# Patient Record
Sex: FEMALE | Race: BLACK OR AFRICAN AMERICAN | NOT HISPANIC OR LATINO | Employment: OTHER | URBAN - METROPOLITAN AREA
[De-identification: names, ages, dates, MRNs, and addresses within clinical notes are randomized per-mention and may not be internally consistent; named-entity substitution may affect disease eponyms.]

---

## 2023-06-07 ENCOUNTER — APPOINTMENT (OUTPATIENT)
Dept: PRIMARY CARE | Facility: CLINIC | Age: 78
End: 2023-06-07
Payer: MEDICARE

## 2023-08-17 ENCOUNTER — LAB (OUTPATIENT)
Dept: LAB | Facility: LAB | Age: 78
End: 2023-08-17
Payer: MEDICARE

## 2023-08-17 ENCOUNTER — OFFICE VISIT (OUTPATIENT)
Dept: PRIMARY CARE | Facility: CLINIC | Age: 78
End: 2023-08-17
Payer: MEDICARE

## 2023-08-17 VITALS
BODY MASS INDEX: 23.59 KG/M2 | HEART RATE: 58 BPM | WEIGHT: 146.8 LBS | DIASTOLIC BLOOD PRESSURE: 67 MMHG | SYSTOLIC BLOOD PRESSURE: 128 MMHG | OXYGEN SATURATION: 99 % | HEIGHT: 66 IN

## 2023-08-17 DIAGNOSIS — Z95.0 S/P PLACEMENT OF CARDIAC PACEMAKER: ICD-10-CM

## 2023-08-17 DIAGNOSIS — Z95.1 S/P CABG (CORONARY ARTERY BYPASS GRAFT): ICD-10-CM

## 2023-08-17 DIAGNOSIS — R35.0 INCREASED FREQUENCY OF URINATION: ICD-10-CM

## 2023-08-17 DIAGNOSIS — I44.2 COMPLETE HEART BLOCK (MULTI): ICD-10-CM

## 2023-08-17 DIAGNOSIS — I48.3 TYPICAL ATRIAL FLUTTER (MULTI): Primary | ICD-10-CM

## 2023-08-17 DIAGNOSIS — Z76.89 ENCOUNTER TO ESTABLISH CARE: ICD-10-CM

## 2023-08-17 DIAGNOSIS — Z95.2 S/P MVR (MITRAL VALVE REPLACEMENT): ICD-10-CM

## 2023-08-17 DIAGNOSIS — K57.32 DIVERTICULITIS OF LARGE INTESTINE WITHOUT PERFORATION OR ABSCESS WITHOUT BLEEDING: ICD-10-CM

## 2023-08-17 DIAGNOSIS — E78.2 MIXED HYPERLIPIDEMIA: ICD-10-CM

## 2023-08-17 DIAGNOSIS — I10 PRIMARY HYPERTENSION: ICD-10-CM

## 2023-08-17 PROBLEM — E78.5 HYPERLIPIDEMIA: Status: ACTIVE | Noted: 2023-08-17

## 2023-08-17 PROBLEM — M43.17 SPONDYLOLISTHESIS OF LUMBOSACRAL REGION: Status: ACTIVE | Noted: 2023-08-17

## 2023-08-17 PROBLEM — F41.9 ANXIETY DISORDER: Status: ACTIVE | Noted: 2023-08-17

## 2023-08-17 LAB
ALANINE AMINOTRANSFERASE (SGPT) (U/L) IN SER/PLAS: 7 U/L (ref 7–45)
ALBUMIN (G/DL) IN SER/PLAS: 4.3 G/DL (ref 3.4–5)
ALKALINE PHOSPHATASE (U/L) IN SER/PLAS: 74 U/L (ref 33–136)
ANION GAP IN SER/PLAS: 14 MMOL/L (ref 10–20)
APPEARANCE, URINE: CLEAR
ASPARTATE AMINOTRANSFERASE (SGOT) (U/L) IN SER/PLAS: 14 U/L (ref 9–39)
BASOPHILS (10*3/UL) IN BLOOD BY AUTOMATED COUNT: 0.12 X10E9/L (ref 0–0.1)
BASOPHILS/100 LEUKOCYTES IN BLOOD BY AUTOMATED COUNT: 0.6 % (ref 0–2)
BILIRUBIN TOTAL (MG/DL) IN SER/PLAS: 0.6 MG/DL (ref 0–1.2)
BILIRUBIN, URINE: NEGATIVE
BLOOD, URINE: ABNORMAL
C REACTIVE PROTEIN (MG/L) IN SER/PLAS: 5.25 MG/DL
CALCIUM (MG/DL) IN SER/PLAS: 10 MG/DL (ref 8.6–10.3)
CARBON DIOXIDE, TOTAL (MMOL/L) IN SER/PLAS: 27 MMOL/L (ref 21–32)
CHLORIDE (MMOL/L) IN SER/PLAS: 97 MMOL/L (ref 98–107)
COLOR, URINE: ABNORMAL
CREATININE (MG/DL) IN SER/PLAS: 1.03 MG/DL (ref 0.5–1.05)
EOSINOPHILS (10*3/UL) IN BLOOD BY AUTOMATED COUNT: 0.15 X10E9/L (ref 0–0.4)
EOSINOPHILS/100 LEUKOCYTES IN BLOOD BY AUTOMATED COUNT: 0.8 % (ref 0–6)
ERYTHROCYTE DISTRIBUTION WIDTH (RATIO) BY AUTOMATED COUNT: 13.4 % (ref 11.5–14.5)
ERYTHROCYTE MEAN CORPUSCULAR HEMOGLOBIN CONCENTRATION (G/DL) BY AUTOMATED: 32.7 G/DL (ref 32–36)
ERYTHROCYTE MEAN CORPUSCULAR VOLUME (FL) BY AUTOMATED COUNT: 81 FL (ref 80–100)
ERYTHROCYTES (10*6/UL) IN BLOOD BY AUTOMATED COUNT: 4.97 X10E12/L (ref 4–5.2)
GFR FEMALE: 56 ML/MIN/1.73M2
GLUCOSE (MG/DL) IN SER/PLAS: 96 MG/DL (ref 74–99)
GLUCOSE, URINE: NEGATIVE MG/DL
HEMATOCRIT (%) IN BLOOD BY AUTOMATED COUNT: 40.4 % (ref 36–46)
HEMOGLOBIN (G/DL) IN BLOOD: 13.2 G/DL (ref 12–16)
IMMATURE GRANULOCYTES/100 LEUKOCYTES IN BLOOD BY AUTOMATED COUNT: 0.5 % (ref 0–0.9)
KETONES, URINE: NEGATIVE MG/DL
LEUKOCYTE ESTERASE, URINE: ABNORMAL
LEUKOCYTES (10*3/UL) IN BLOOD BY AUTOMATED COUNT: 19 X10E9/L (ref 4.4–11.3)
LYMPHOCYTES (10*3/UL) IN BLOOD BY AUTOMATED COUNT: 9.06 X10E9/L (ref 0.8–3)
LYMPHOCYTES/100 LEUKOCYTES IN BLOOD BY AUTOMATED COUNT: 47.7 % (ref 13–44)
MONOCYTES (10*3/UL) IN BLOOD BY AUTOMATED COUNT: 1.18 X10E9/L (ref 0.05–0.8)
MONOCYTES/100 LEUKOCYTES IN BLOOD BY AUTOMATED COUNT: 6.2 % (ref 2–10)
NEUTROPHILS (10*3/UL) IN BLOOD BY AUTOMATED COUNT: 8.38 X10E9/L (ref 1.6–5.5)
NEUTROPHILS/100 LEUKOCYTES IN BLOOD BY AUTOMATED COUNT: 44.2 % (ref 40–80)
NITRITE, URINE: NEGATIVE
PH, URINE: 7 (ref 5–8)
PLATELETS (10*3/UL) IN BLOOD AUTOMATED COUNT: 264 X10E9/L (ref 150–450)
POTASSIUM (MMOL/L) IN SER/PLAS: 4.3 MMOL/L (ref 3.5–5.3)
PROTEIN TOTAL: 8 G/DL (ref 6.4–8.2)
PROTEIN, URINE: NEGATIVE MG/DL
RBC, URINE: NORMAL /HPF (ref 0–5)
SODIUM (MMOL/L) IN SER/PLAS: 134 MMOL/L (ref 136–145)
SPECIFIC GRAVITY, URINE: 1 (ref 1–1.03)
SQUAMOUS EPITHELIAL CELLS, URINE: NORMAL /HPF
UREA NITROGEN (MG/DL) IN SER/PLAS: 14 MG/DL (ref 6–23)
UROBILINOGEN, URINE: <2 MG/DL (ref 0–1.9)
WBC, URINE: NORMAL /HPF (ref 0–5)

## 2023-08-17 PROCEDURE — 36415 COLL VENOUS BLD VENIPUNCTURE: CPT

## 2023-08-17 PROCEDURE — 99204 OFFICE O/P NEW MOD 45 MIN: CPT | Performed by: STUDENT IN AN ORGANIZED HEALTH CARE EDUCATION/TRAINING PROGRAM

## 2023-08-17 PROCEDURE — 80053 COMPREHEN METABOLIC PANEL: CPT

## 2023-08-17 PROCEDURE — 1126F AMNT PAIN NOTED NONE PRSNT: CPT | Performed by: STUDENT IN AN ORGANIZED HEALTH CARE EDUCATION/TRAINING PROGRAM

## 2023-08-17 PROCEDURE — 85025 COMPLETE CBC W/AUTO DIFF WBC: CPT

## 2023-08-17 PROCEDURE — 1159F MED LIST DOCD IN RCRD: CPT | Performed by: STUDENT IN AN ORGANIZED HEALTH CARE EDUCATION/TRAINING PROGRAM

## 2023-08-17 PROCEDURE — 3074F SYST BP LT 130 MM HG: CPT | Performed by: STUDENT IN AN ORGANIZED HEALTH CARE EDUCATION/TRAINING PROGRAM

## 2023-08-17 PROCEDURE — 81001 URINALYSIS AUTO W/SCOPE: CPT

## 2023-08-17 PROCEDURE — 1036F TOBACCO NON-USER: CPT | Performed by: STUDENT IN AN ORGANIZED HEALTH CARE EDUCATION/TRAINING PROGRAM

## 2023-08-17 PROCEDURE — 1157F ADVNC CARE PLAN IN RCRD: CPT | Performed by: STUDENT IN AN ORGANIZED HEALTH CARE EDUCATION/TRAINING PROGRAM

## 2023-08-17 PROCEDURE — 1160F RVW MEDS BY RX/DR IN RCRD: CPT | Performed by: STUDENT IN AN ORGANIZED HEALTH CARE EDUCATION/TRAINING PROGRAM

## 2023-08-17 PROCEDURE — 3078F DIAST BP <80 MM HG: CPT | Performed by: STUDENT IN AN ORGANIZED HEALTH CARE EDUCATION/TRAINING PROGRAM

## 2023-08-17 PROCEDURE — 86140 C-REACTIVE PROTEIN: CPT

## 2023-08-17 RX ORDER — AMLODIPINE BESYLATE 10 MG/1
10 TABLET ORAL DAILY
COMMUNITY
Start: 2020-08-25 | End: 2023-12-01 | Stop reason: SDUPTHER

## 2023-08-17 RX ORDER — ATORVASTATIN CALCIUM 40 MG/1
40 TABLET, FILM COATED ORAL
COMMUNITY
End: 2023-12-13 | Stop reason: SDUPTHER

## 2023-08-17 RX ORDER — DOCUSATE SODIUM 100 MG/1
100 CAPSULE, LIQUID FILLED ORAL 2 TIMES DAILY
COMMUNITY
Start: 2019-09-20 | End: 2024-01-25 | Stop reason: SDUPTHER

## 2023-08-17 RX ORDER — METOPROLOL TARTRATE 50 MG/1
50 TABLET ORAL EVERY 12 HOURS
COMMUNITY
End: 2023-09-18 | Stop reason: SDUPTHER

## 2023-08-17 RX ORDER — SPIRONOLACTONE 25 MG/1
25 TABLET ORAL DAILY
COMMUNITY
Start: 2019-10-25 | End: 2023-10-10 | Stop reason: SDUPTHER

## 2023-08-17 RX ORDER — ALBUTEROL SULFATE 90 UG/1
AEROSOL, METERED RESPIRATORY (INHALATION)
COMMUNITY
Start: 2017-04-18 | End: 2023-12-18

## 2023-08-17 RX ORDER — ACETAMINOPHEN 500 MG
1 TABLET ORAL DAILY
COMMUNITY
Start: 2021-12-17 | End: 2023-08-17

## 2023-08-17 RX ORDER — WARFARIN 3 MG/1
1 TABLET ORAL DAILY
COMMUNITY
Start: 2019-10-08 | End: 2023-12-01 | Stop reason: SDUPTHER

## 2023-08-17 RX ORDER — CHOLECALCIFEROL (VITAMIN D3) 125 MCG
CAPSULE ORAL
COMMUNITY

## 2023-08-17 RX ORDER — METRONIDAZOLE 500 MG/1
500 TABLET ORAL 3 TIMES DAILY
Qty: 30 TABLET | Refills: 0 | Status: SHIPPED | OUTPATIENT
Start: 2023-08-17 | End: 2023-08-28 | Stop reason: ALTCHOICE

## 2023-08-17 RX ORDER — LANOLIN ALCOHOL/MO/W.PET/CERES
1 CREAM (GRAM) TOPICAL DAILY
COMMUNITY
Start: 2019-12-05

## 2023-08-17 RX ORDER — LEVOFLOXACIN 750 MG/1
750 TABLET ORAL DAILY
Qty: 10 TABLET | Refills: 0 | Status: SHIPPED | OUTPATIENT
Start: 2023-08-17 | End: 2023-08-28 | Stop reason: ALTCHOICE

## 2023-08-17 RX ORDER — FERROUS SULFATE 325(65) MG
65 TABLET ORAL
COMMUNITY
Start: 2019-10-25

## 2023-08-17 ASSESSMENT — ENCOUNTER SYMPTOMS
LOSS OF SENSATION IN FEET: 0
OCCASIONAL FEELINGS OF UNSTEADINESS: 0
DEPRESSION: 0

## 2023-08-17 NOTE — PROGRESS NOTES
"Subjective   Patient ID: Neha Tsang is a 77 y.o. female who presents for New Patient Visit (Establish care. ).        HPI    Est care   Pt's PMH, PSH, SH, FH , meds and allergies was obtained / reviewed and updated .     78 y/o female with HTN, HPL , CAD s/p CABG , mitral and tricuspid valve repair , atrial flutter ablation and pacemaker placement in 2019, ON Ac with coumadin clinic , recurrent diverticulitis , advanced DJD L spine     Pt reports LLQ pain X 5 weeks   Started with constipation followed by diarrhea   Colonoscopy in March 2021 , reviewed           Visit Vitals  /67   Pulse 58   Ht 1.676 m (5' 6\")   Wt 66.6 kg (146 lb 12.8 oz)   SpO2 99%   BMI 23.69 kg/m²   Smoking Status Former   BSA 1.76 m²      No LMP recorded.     Review of Systems    Constitutional : No feeling poorly / fevers/ chills / night sweats/ fatigue   Cardiovascular : No CP /Palpitations/ lower extremity edema / syncope   Respiratory : No Cough /LARA/Dyspnea at rest   Gastrointestinal : No abd pain / N/V  No bloody stools/ melena / constipation  Endo : No polyuria/polydipsia/ muscle weakness / sluggishness   CNS: No confusion / HA/ tingling/ numbness/ weakness of extremities  Psychiatric: No anxiety/ depression/ SI/HI    All other systems have been reviewed and are negative for complaint       Physical Exam    Constitutional : Vitals reviewed. Alert and in no distress  Cardiovascular : RRR, Normal S1, S2, No pericardial rub/ gallop, no peripheral edema   Pulmonary: No respiratory distress, CTAB   Abdomen : Soft to touch , Tender to palpation in the LLQ , normal BS , no appreciable  organomegaly   MSK : Normal gait and station , strength and tone     Neurologic : CNs 2-12 grossly intact , no obvious FNDs  Psych : A,Ox3, normal mood and affect      Assessment/Plan   Diagnoses and all orders for this visit:  Typical atrial flutter (CMS/HCC)  Encounter to establish care  S/P placement of cardiac pacemaker  S/P CABG (coronary " artery bypass graft)  S/P MVR (mitral valve replacement)  Primary hypertension  Mixed hyperlipidemia  Complete heart block (CMS/HCC)  Diverticulitis of large intestine without perforation or abscess without bleeding  -     CBC and Auto Differential; Future  -     Comprehensive Metabolic Panel; Future  -     C-reactive protein; Future  -     levoFLOXacin (Levaquin) 750 mg tablet; Take 1 tablet (750 mg) by mouth once daily for 10 days.  -     metroNIDAZOLE (Flagyl) 500 mg tablet; Take 1 tablet (500 mg) by mouth 3 times a day for 10 days.  Increased frequency of urination  -     Urinalysis with Reflex Microscopic; Future  -     Urine Culture; Future    78 y/o female with HTN, HPL , CAD s/p CABG , mitral and tricuspid valve repair , atrial flutter ablation and pacemaker placement in 2019, ON Ac with coumadin clinic , recurrent diverticulitis , advanced DJD L spine      Given the history of recurrent diverticulitis, Colonoscopy findings and presenting sx , will rx with abx   Labs as noted above   Discussed the possibility of supra therapeutic inr while on abx     UA to r/o infection for increased frequency of urination     RTO in 1m   Labs to be done today

## 2023-08-25 ENCOUNTER — PATIENT OUTREACH (OUTPATIENT)
Dept: PRIMARY CARE | Facility: CLINIC | Age: 78
End: 2023-08-25
Payer: MEDICARE

## 2023-08-25 ENCOUNTER — DOCUMENTATION (OUTPATIENT)
Dept: PRIMARY CARE | Facility: CLINIC | Age: 78
End: 2023-08-25
Payer: MEDICARE

## 2023-08-25 RX ORDER — ACETAMINOPHEN 325 MG/1
TABLET ORAL EVERY 4 HOURS PRN
COMMUNITY
Start: 2021-06-23

## 2023-08-25 RX ORDER — ASPIRIN 81 MG/1
TABLET ORAL
COMMUNITY
Start: 2019-10-07

## 2023-08-25 RX ORDER — AMOXICILLIN AND CLAVULANATE POTASSIUM 875; 125 MG/1; MG/1
TABLET, FILM COATED ORAL EVERY 12 HOURS
COMMUNITY
Start: 2023-08-24 | End: 2023-08-28

## 2023-08-25 NOTE — PROGRESS NOTES
Discharge Facility:  [facility]   Discharge Diagnosis:  Acute sigmoid Diverticulitis, abdominal pain, failed outpatient treatment, attention, previous history of coronary artery bypass surgery and mitral and tricuspid valve repair, chronic atrial fibrillation on Coumadin, Coumadin induced coagulopathy.  Admission Date: 8-21-23  Discharge Date: 8-24-23     PCP Appointment Date: 8-28-23  Specialist Appointment Date:  n/a  Hospital Encounter and Summary: Linked   See discharge assessment below for further details  Engagement  Call Start Time: 0856 (8/25/2023  9:01 AM)    Medications  Medications reviewed with patient/caregiver?: Yes (8/25/2023  9:01 AM)  Is the patient having any side effects they believe may be caused by any medication additions or changes?: No (8/25/2023  9:01 AM)  Does the patient have all medications ordered at discharge?: Yes (8/25/2023  9:01 AM)  Is the patient taking all medications as directed (includes completed medication regime)?: Yes (8/25/2023  9:01 AM)  Care Management Interventions: Provided patient education (8/25/2023  9:01 AM)    Appointments  Does the patient have a primary care provider?: Yes (8/25/2023  9:01 AM)  Care Management Interventions: Verified appointment date/time/provider (8/25/2023  9:01 AM)  Has the patient kept scheduled appointments due by today?: Yes (8/25/2023  9:01 AM)  Care Management Interventions: Advised patient to keep appointment (8/25/2023  9:01 AM)    Self Management  Has home health visited the patient within 72 hours of discharge?: Not applicable (8/25/2023  9:01 AM)    Patient Teaching  Does the patient have access to their discharge instructions?: Yes (8/25/2023  9:01 AM)  Care Management Interventions: Reviewed instructions with patient (8/25/2023  9:01 AM)  What is the patient's perception of their health status since discharge?: Improving (8/25/2023  9:01 AM)  Is the patient/caregiver able to teach back the hierarchy of who to call/visit for  symptoms/problems? PCP, Specialist, Home Health nurse, Urgent Care, ED, 911: Yes (8/25/2023  9:01 AM)      Jyothi Addison LPN

## 2023-08-28 ENCOUNTER — LAB (OUTPATIENT)
Dept: LAB | Facility: LAB | Age: 78
End: 2023-08-28
Payer: MEDICARE

## 2023-08-28 ENCOUNTER — OFFICE VISIT (OUTPATIENT)
Dept: PRIMARY CARE | Facility: CLINIC | Age: 78
End: 2023-08-28
Payer: MEDICARE

## 2023-08-28 VITALS
OXYGEN SATURATION: 99 % | SYSTOLIC BLOOD PRESSURE: 142 MMHG | HEIGHT: 66 IN | WEIGHT: 143.2 LBS | BODY MASS INDEX: 23.01 KG/M2 | HEART RATE: 110 BPM | DIASTOLIC BLOOD PRESSURE: 75 MMHG

## 2023-08-28 DIAGNOSIS — Z09 HOSPITAL DISCHARGE FOLLOW-UP: ICD-10-CM

## 2023-08-28 DIAGNOSIS — M15.9 GOA (GENERALIZED OSTEOARTHRITIS): ICD-10-CM

## 2023-08-28 DIAGNOSIS — B37.31 VAGINAL YEAST INFECTION: Primary | ICD-10-CM

## 2023-08-28 DIAGNOSIS — R30.0 DYSURIA: ICD-10-CM

## 2023-08-28 LAB
ANION GAP IN SER/PLAS: 15 MMOL/L (ref 10–20)
APPEARANCE, URINE: ABNORMAL
BACTERIA, URINE: ABNORMAL /HPF
BILIRUBIN, URINE: NEGATIVE
BLOOD, URINE: ABNORMAL
CALCIUM (MG/DL) IN SER/PLAS: 10.1 MG/DL (ref 8.6–10.3)
CALCIUM OXALATE CRYSTALS, URINE: ABNORMAL /HPF
CARBON DIOXIDE, TOTAL (MMOL/L) IN SER/PLAS: 27 MMOL/L (ref 21–32)
CHLORIDE (MMOL/L) IN SER/PLAS: 101 MMOL/L (ref 98–107)
COLOR, URINE: YELLOW
CREATININE (MG/DL) IN SER/PLAS: 0.88 MG/DL (ref 0.5–1.05)
GFR FEMALE: 68 ML/MIN/1.73M2
GLUCOSE (MG/DL) IN SER/PLAS: 107 MG/DL (ref 74–99)
GLUCOSE, URINE: NEGATIVE MG/DL
HYALINE CASTS, URINE: ABNORMAL /LPF
KETONES, URINE: NEGATIVE MG/DL
LEUKOCYTE ESTERASE, URINE: ABNORMAL
MUCUS, URINE: ABNORMAL /LPF
NITRITE, URINE: NEGATIVE
PH, URINE: 5 (ref 5–8)
POTASSIUM (MMOL/L) IN SER/PLAS: 3.7 MMOL/L (ref 3.5–5.3)
PROTEIN, URINE: ABNORMAL MG/DL
RBC, URINE: 9 /HPF (ref 0–5)
SODIUM (MMOL/L) IN SER/PLAS: 139 MMOL/L (ref 136–145)
SPECIFIC GRAVITY, URINE: 1.02 (ref 1–1.03)
SQUAMOUS EPITHELIAL CELLS, URINE: 3 /HPF
UREA NITROGEN (MG/DL) IN SER/PLAS: 7 MG/DL (ref 6–23)
UROBILINOGEN, URINE: <2 MG/DL (ref 0–1.9)
WBC, URINE: 20 /HPF (ref 0–5)

## 2023-08-28 PROCEDURE — 1160F RVW MEDS BY RX/DR IN RCRD: CPT | Performed by: STUDENT IN AN ORGANIZED HEALTH CARE EDUCATION/TRAINING PROGRAM

## 2023-08-28 PROCEDURE — 1126F AMNT PAIN NOTED NONE PRSNT: CPT | Performed by: STUDENT IN AN ORGANIZED HEALTH CARE EDUCATION/TRAINING PROGRAM

## 2023-08-28 PROCEDURE — 1157F ADVNC CARE PLAN IN RCRD: CPT | Performed by: STUDENT IN AN ORGANIZED HEALTH CARE EDUCATION/TRAINING PROGRAM

## 2023-08-28 PROCEDURE — 3078F DIAST BP <80 MM HG: CPT | Performed by: STUDENT IN AN ORGANIZED HEALTH CARE EDUCATION/TRAINING PROGRAM

## 2023-08-28 PROCEDURE — 87086 URINE CULTURE/COLONY COUNT: CPT

## 2023-08-28 PROCEDURE — 3077F SYST BP >= 140 MM HG: CPT | Performed by: STUDENT IN AN ORGANIZED HEALTH CARE EDUCATION/TRAINING PROGRAM

## 2023-08-28 PROCEDURE — 80048 BASIC METABOLIC PNL TOTAL CA: CPT

## 2023-08-28 PROCEDURE — 1036F TOBACCO NON-USER: CPT | Performed by: STUDENT IN AN ORGANIZED HEALTH CARE EDUCATION/TRAINING PROGRAM

## 2023-08-28 PROCEDURE — 1159F MED LIST DOCD IN RCRD: CPT | Performed by: STUDENT IN AN ORGANIZED HEALTH CARE EDUCATION/TRAINING PROGRAM

## 2023-08-28 PROCEDURE — 99214 OFFICE O/P EST MOD 30 MIN: CPT | Performed by: STUDENT IN AN ORGANIZED HEALTH CARE EDUCATION/TRAINING PROGRAM

## 2023-08-28 PROCEDURE — 81001 URINALYSIS AUTO W/SCOPE: CPT

## 2023-08-28 PROCEDURE — 36415 COLL VENOUS BLD VENIPUNCTURE: CPT

## 2023-08-28 RX ORDER — FLUCONAZOLE 150 MG/1
150 TABLET ORAL ONCE
Qty: 1 TABLET | Refills: 0 | Status: SHIPPED | OUTPATIENT
Start: 2023-08-28 | End: 2023-08-28 | Stop reason: SDUPTHER

## 2023-08-28 RX ORDER — FLUCONAZOLE 150 MG/1
150 TABLET ORAL ONCE
Qty: 1 TABLET | Refills: 0 | Status: SHIPPED | OUTPATIENT
Start: 2023-08-28 | End: 2023-08-28
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
## 2023-08-30 ENCOUNTER — PATIENT OUTREACH (OUTPATIENT)
Dept: PRIMARY CARE | Facility: CLINIC | Age: 78
End: 2023-08-30
Payer: MEDICARE

## 2023-08-30 LAB — URINE CULTURE: NORMAL

## 2023-08-30 NOTE — PROGRESS NOTES
Call regarding appt. with PCP on (8-28-23) after hospitalization.  At time of outreach call the patient feels as if their condition has (improved) since last visit.  Reviewed the PCP appointment with the pt and addressed any questions or concerns.  Jyothi Addison LPN

## 2023-08-31 DIAGNOSIS — N39.0 URINARY TRACT INFECTION WITHOUT HEMATURIA, SITE UNSPECIFIED: Primary | ICD-10-CM

## 2023-08-31 RX ORDER — NITROFURANTOIN 25; 75 MG/1; MG/1
100 CAPSULE ORAL 2 TIMES DAILY
Qty: 10 CAPSULE | Refills: 0 | Status: SHIPPED | OUTPATIENT
Start: 2023-08-31 | End: 2023-09-05

## 2023-09-12 PROBLEM — M19.011 GLENOHUMERAL ARTHRITIS, RIGHT: Status: ACTIVE | Noted: 2018-02-26

## 2023-09-12 PROBLEM — F50.9 EATING DISORDER: Status: ACTIVE | Noted: 2023-09-12

## 2023-09-12 PROBLEM — J18.9 COMMUNITY ACQUIRED PNEUMONIA: Status: ACTIVE | Noted: 2023-09-12

## 2023-09-12 PROBLEM — Z95.0 PACEMAKER: Status: ACTIVE | Noted: 2023-09-12

## 2023-09-12 PROBLEM — G89.29 CHRONIC LOW BACK PAIN: Status: ACTIVE | Noted: 2023-09-12

## 2023-09-12 PROBLEM — M85.80 OSTEOPENIA: Status: ACTIVE | Noted: 2023-09-12

## 2023-09-12 PROBLEM — I07.1 TRICUSPID REGURGITATION: Status: ACTIVE | Noted: 2023-09-12

## 2023-09-12 PROBLEM — K86.89 PANCREATIC MASS (HHS-HCC): Status: ACTIVE | Noted: 2023-09-12

## 2023-09-12 PROBLEM — N83.01 FOLLICULAR CYST OF RIGHT OVARY: Status: ACTIVE | Noted: 2023-09-12

## 2023-09-12 PROBLEM — K57.92 DIVERTICULITIS: Status: ACTIVE | Noted: 2023-09-12

## 2023-09-12 PROBLEM — R19.00 PELVIC MASS: Status: ACTIVE | Noted: 2023-09-12

## 2023-09-12 PROBLEM — I34.0 MITRAL REGURGITATION: Status: ACTIVE | Noted: 2023-09-12

## 2023-09-12 PROBLEM — M75.20 BICIPITAL TENDINITIS: Status: ACTIVE | Noted: 2023-09-12

## 2023-09-12 PROBLEM — N83.209 OVARIAN CYST: Status: ACTIVE | Noted: 2023-09-12

## 2023-09-12 PROBLEM — R30.0 DYSURIA: Status: ACTIVE | Noted: 2023-09-12

## 2023-09-12 PROBLEM — M19.90 OSTEOARTHRITIS, CHRONIC: Status: ACTIVE | Noted: 2023-09-12

## 2023-09-12 PROBLEM — R29.818 SUSPECTED SLEEP APNEA: Status: ACTIVE | Noted: 2023-09-12

## 2023-09-12 PROBLEM — I25.10 CAD (CORONARY ARTERY DISEASE): Status: ACTIVE | Noted: 2023-09-12

## 2023-09-12 PROBLEM — G47.9 SLEEP DISTURBANCE: Status: ACTIVE | Noted: 2023-09-12

## 2023-09-12 PROBLEM — M54.50 CHRONIC LOW BACK PAIN: Status: ACTIVE | Noted: 2023-09-12

## 2023-09-12 RX ORDER — GABAPENTIN 300 MG/1
300 CAPSULE ORAL NIGHTLY
COMMUNITY

## 2023-09-12 RX ORDER — LISINOPRIL 20 MG/1
20 TABLET ORAL 2 TIMES DAILY
COMMUNITY
Start: 2019-10-05 | End: 2023-12-01 | Stop reason: SDUPTHER

## 2023-09-12 RX ORDER — LISINOPRIL 10 MG/1
10 TABLET ORAL
COMMUNITY
Start: 2019-10-07 | End: 2023-12-18

## 2023-09-12 RX ORDER — CHLORTHALIDONE 25 MG/1
25 TABLET ORAL
COMMUNITY
Start: 2019-10-16 | End: 2023-12-18

## 2023-09-12 RX ORDER — ACETAMINOPHEN 500 MG
1 TABLET ORAL DAILY
COMMUNITY
End: 2024-05-02

## 2023-09-18 ENCOUNTER — LAB (OUTPATIENT)
Dept: LAB | Facility: LAB | Age: 78
End: 2023-09-18
Payer: MEDICARE

## 2023-09-18 ENCOUNTER — OFFICE VISIT (OUTPATIENT)
Dept: PRIMARY CARE | Facility: CLINIC | Age: 78
End: 2023-09-18
Payer: MEDICARE

## 2023-09-18 VITALS
BODY MASS INDEX: 23.59 KG/M2 | DIASTOLIC BLOOD PRESSURE: 60 MMHG | WEIGHT: 146.8 LBS | OXYGEN SATURATION: 99 % | HEART RATE: 62 BPM | HEIGHT: 66 IN | SYSTOLIC BLOOD PRESSURE: 150 MMHG

## 2023-09-18 DIAGNOSIS — R39.15 URGENCY OF URINATION: ICD-10-CM

## 2023-09-18 DIAGNOSIS — Z28.21 IMMUNIZATION DECLINED: ICD-10-CM

## 2023-09-18 DIAGNOSIS — R30.0 DYSURIA: Primary | ICD-10-CM

## 2023-09-18 DIAGNOSIS — Z00.00 MEDICARE ANNUAL WELLNESS VISIT, SUBSEQUENT: ICD-10-CM

## 2023-09-18 DIAGNOSIS — I10 PRIMARY HYPERTENSION: Primary | ICD-10-CM

## 2023-09-18 DIAGNOSIS — Z78.0 ASYMPTOMATIC MENOPAUSE: ICD-10-CM

## 2023-09-18 DIAGNOSIS — Z12.31 VISIT FOR SCREENING MAMMOGRAM: ICD-10-CM

## 2023-09-18 LAB
APPEARANCE, URINE: CLEAR
BILIRUBIN, URINE: NEGATIVE
BLOOD, URINE: ABNORMAL
COLOR, URINE: YELLOW
GLUCOSE, URINE: NEGATIVE MG/DL
KETONES, URINE: NEGATIVE MG/DL
LEUKOCYTE ESTERASE, URINE: ABNORMAL
NITRITE, URINE: NEGATIVE
PH, URINE: 5 (ref 5–8)
PROTEIN, URINE: NEGATIVE MG/DL
RBC, URINE: 1 /HPF (ref 0–5)
SPECIFIC GRAVITY, URINE: 1.01 (ref 1–1.03)
SQUAMOUS EPITHELIAL CELLS, URINE: <1 /HPF
UROBILINOGEN, URINE: <2 MG/DL (ref 0–1.9)
WBC, URINE: 3 /HPF (ref 0–5)

## 2023-09-18 PROCEDURE — 1036F TOBACCO NON-USER: CPT | Performed by: STUDENT IN AN ORGANIZED HEALTH CARE EDUCATION/TRAINING PROGRAM

## 2023-09-18 PROCEDURE — 81001 URINALYSIS AUTO W/SCOPE: CPT

## 2023-09-18 PROCEDURE — 3078F DIAST BP <80 MM HG: CPT | Performed by: STUDENT IN AN ORGANIZED HEALTH CARE EDUCATION/TRAINING PROGRAM

## 2023-09-18 PROCEDURE — 87186 SC STD MICRODIL/AGAR DIL: CPT

## 2023-09-18 PROCEDURE — 3077F SYST BP >= 140 MM HG: CPT | Performed by: STUDENT IN AN ORGANIZED HEALTH CARE EDUCATION/TRAINING PROGRAM

## 2023-09-18 PROCEDURE — 1126F AMNT PAIN NOTED NONE PRSNT: CPT | Performed by: STUDENT IN AN ORGANIZED HEALTH CARE EDUCATION/TRAINING PROGRAM

## 2023-09-18 PROCEDURE — G0439 PPPS, SUBSEQ VISIT: HCPCS | Performed by: STUDENT IN AN ORGANIZED HEALTH CARE EDUCATION/TRAINING PROGRAM

## 2023-09-18 PROCEDURE — 1157F ADVNC CARE PLAN IN RCRD: CPT | Performed by: STUDENT IN AN ORGANIZED HEALTH CARE EDUCATION/TRAINING PROGRAM

## 2023-09-18 PROCEDURE — 1170F FXNL STATUS ASSESSED: CPT | Performed by: STUDENT IN AN ORGANIZED HEALTH CARE EDUCATION/TRAINING PROGRAM

## 2023-09-18 PROCEDURE — 99214 OFFICE O/P EST MOD 30 MIN: CPT | Performed by: STUDENT IN AN ORGANIZED HEALTH CARE EDUCATION/TRAINING PROGRAM

## 2023-09-18 PROCEDURE — 87077 CULTURE AEROBIC IDENTIFY: CPT

## 2023-09-18 PROCEDURE — 1159F MED LIST DOCD IN RCRD: CPT | Performed by: STUDENT IN AN ORGANIZED HEALTH CARE EDUCATION/TRAINING PROGRAM

## 2023-09-18 PROCEDURE — 1160F RVW MEDS BY RX/DR IN RCRD: CPT | Performed by: STUDENT IN AN ORGANIZED HEALTH CARE EDUCATION/TRAINING PROGRAM

## 2023-09-18 PROCEDURE — 87086 URINE CULTURE/COLONY COUNT: CPT

## 2023-09-18 RX ORDER — METOPROLOL TARTRATE 50 MG/1
50 TABLET ORAL EVERY 12 HOURS
Qty: 180 TABLET | Refills: 3 | Status: SHIPPED | OUTPATIENT
Start: 2023-09-18 | End: 2023-12-01 | Stop reason: SDUPTHER

## 2023-09-18 RX ORDER — NITROFURANTOIN 25; 75 MG/1; MG/1
100 CAPSULE ORAL 2 TIMES DAILY
Qty: 10 CAPSULE | Refills: 0 | Status: SHIPPED | OUTPATIENT
Start: 2023-09-18 | End: 2023-09-23

## 2023-09-18 ASSESSMENT — ACTIVITIES OF DAILY LIVING (ADL)
BATHING: INDEPENDENT
DOING_HOUSEWORK: INDEPENDENT
TAKING_MEDICATION: INDEPENDENT
GROCERY_SHOPPING: INDEPENDENT
MANAGING_FINANCES: INDEPENDENT
DRESSING: INDEPENDENT

## 2023-09-18 ASSESSMENT — PATIENT HEALTH QUESTIONNAIRE - PHQ9
2. FEELING DOWN, DEPRESSED OR HOPELESS: NOT AT ALL
1. LITTLE INTEREST OR PLEASURE IN DOING THINGS: NOT AT ALL
SUM OF ALL RESPONSES TO PHQ9 QUESTIONS 1 AND 2: 0

## 2023-09-18 NOTE — PROGRESS NOTES
"Subjective   Patient ID: Neha Tsang is a 77 y.o. female who presents for Follow-up (1 month follow-up. ).        HPI    78 y/o female with HTN, HPL , CAD s/p CABG , mitral and tricuspid valve repair , atrial flutter ablation and pacemaker placement in 2019, ON Ac with coumadin clinic , recurrent diverticulitis , advanced DJD L spine        Visit Vitals  /60   Pulse 62   Ht 1.676 m (5' 6\")   Wt 66.6 kg (146 lb 12.8 oz)   SpO2 99%   BMI 23.69 kg/m²   Smoking Status Former   BSA 1.76 m²      No LMP recorded.     Review of Systems    Constitutional : No feeling poorly / fevers/ chills / night sweats/ fatigue   Cardiovascular : No CP /Palpitations/ lower extremity edema / syncope   Respiratory : No Cough /LARA/Dyspnea at rest   Gastrointestinal : No abd pain / N/V  No bloody stools/ melena / constipation  Endo : No polyuria/polydipsia/ muscle weakness / sluggishness   CNS: No confusion / HA/ tingling/ numbness/ weakness of extremities  Psychiatric: No anxiety/ depression/ SI/HI    All other systems have been reviewed and are negative for complaint       Physical Exam    Constitutional : Vitals reviewed. Alert and in no distress  Cardiovascular : RRR, Normal S1, S2, No pericardial rub/ gallop, no peripheral edema   Pulmonary: No respiratory distress, CTAB   MSK : Normal gait and station , strength and tone     Neurologic : CNs 2-12 grossly intact , no obvious FNDs  Psych : A,Ox3, normal mood and affect      Assessment/Plan   Diagnoses and all orders for this visit:  Primary hypertension  -     metoprolol tartrate (Lopressor) 50 mg tablet; Take 1 tablet by mouth every 12 hours.  Asymptomatic menopause  -     XR DEXA bone density; Future  Visit for screening mammogram  -     BI mammo bilateral screening tomosynthesis; Future  Medicare annual wellness visit, subsequent  Immunization declined  Urgency of urination  -     Urinalysis with Reflex Microscopic; Future  -     Urine Culture; Future      78 y/o female " with HTN, HPL , CAD s/p CABG , mitral and tricuspid valve repair , atrial flutter ablation and pacemaker placement in 2019, ON Ac with coumadin clinic , recurrent diverticulitis , advanced DJD L spine       Rpt BP as noted in vtials     Conditions addressed and mgmt as noted above.  Pertinent labs, images/ imaging reports , chart review was done .   Age appropriate labs / labs for mgmt of chronic medical conditions ordered, further mgmt pending the results.

## 2023-09-18 NOTE — PROGRESS NOTES
"Subjective   Reason for Visit: Neha Tsang is an 77 y.o. female here for a Medicare Wellness visit.     Past Medical, Surgical, and Family History reviewed and updated in chart.    Reviewed all medications by prescribing practitioner or clinical pharmacist (such as prescriptions, OTCs, herbal therapies and supplements) and documented in the medical record.    HPI    Patient Care Team:  Octavia Marques MD as PCP - General (Family Medicine)  Jyothi Addison LPN as Care Manager (Case Management)     Review of Systems    Constitutional: no chills, no fever and no night sweats.     Eyes: no blurred vision and no eyesight problems.     ENT: no hearing loss, no nasal congestion, no nasal discharge, no hoarseness and no sore throat.     Cardiovascular: no chest pain, no intermittent leg claudication, no lower extremity edema, no palpitations and no syncope.     Respiratory: no cough, no shortness of breath during exertion, no shortness of breath at rest and no wheezing.     Gastrointestinal:LLQ pain ,  no blood in stools, no constipation, no diarrhea, no melena, no nausea, no rectal pain and no vomiting.     Genitourinary: no dysuria, no change in urinary frequency, no urinary hesitancy, no feelings of urinary urgency and no vaginal discharge.     Musculoskeletal: no arthralgias, no back pain and no myalgias.     Integumentary: no new skin lesions and no rashes.     Neurological: no difficulty walking, no headache, no limb weakness, no numbness and no tingling.     Psychiatric: no anxiety, no depression, no anhedonia and no substance use disorders.     Endocrine: no recent weight gain and no recent weight loss.     Hematologic/Lymphatic: no tendency for easy bruising and no swollen glands.          All other systems have been reviewed and are negative for complaint.    Objective   Vitals:  /69   Pulse 62   Ht 1.676 m (5' 6\")   Wt 66.6 kg (146 lb 12.8 oz)   SpO2 99%   BMI 23.69 kg/m²   "     Physical Exam    Constitutional: Alert and in no acute distress. Well developed, well nourished.     Eyes: Normal external exam.     Ears, Nose, Mouth, and Throat: External inspection of ears and nose: Normal.     Neck: No neck mass was observed. Supple.     Cardiovascular: Heart rate and rhythm were normal, normal S1 and S2, no gallops, no murmurs and no pericardial rub    Pulmonary: No respiratory distress. Clear bilateral breath sounds.     Abdomen: Soft nontender; no abdominal mass palpated. No organomegaly.     Musculoskeletal: No joint swelling seen, normal movements of all extremities. Range of motion: Normal.  Muscle strength/tone: Normal.          Neurologic: Deep tendon reflexes were 2+ and symmetric. Sensation: Normal.     Psychiatric: Judgment and insight: Intact. Mood and affect: Normal.      Assessment/Plan   Problem List Items Addressed This Visit       Hypertension - Primary    Relevant Medications    metoprolol tartrate (Lopressor) 50 mg tablet     Other Visit Diagnoses       Asymptomatic menopause        Relevant Orders    XR DEXA bone density    Visit for screening mammogram        Relevant Orders    BI mammo bilateral screening tomosynthesis    Medicare annual wellness visit, subsequent        Immunization declined                      Immunizations :  Influenza :  declined   Prevnar 20 : Given t previously   Pneumovax 23: Given  in 2015  Shingles:   recommended to receive at the pharmacy    Cancer screenings:   Mammogram :   Screening ordered  Cervical cancer:  Screening  not indicated  Colon cancer:     Screening current  , march 2023   Lung cancer :    not indicated  HIV screening:  / not indicated  Osteoporosis :   Screening  ordered , osteopenia in 2021

## 2023-09-20 DIAGNOSIS — R39.15 URGENCY OF URINATION: Primary | ICD-10-CM

## 2023-09-20 LAB — URINE CULTURE: ABNORMAL

## 2023-09-20 RX ORDER — AMOXICILLIN 500 MG/1
500 CAPSULE ORAL EVERY 12 HOURS SCHEDULED
Qty: 14 CAPSULE | Refills: 0 | Status: SHIPPED | OUTPATIENT
Start: 2023-09-20 | End: 2023-09-27

## 2023-09-22 ENCOUNTER — PATIENT OUTREACH (OUTPATIENT)
Dept: PRIMARY CARE | Facility: CLINIC | Age: 78
End: 2023-09-22
Payer: MEDICARE

## 2023-09-22 NOTE — PROGRESS NOTES
Call placed regarding one month post discharge follow up call.  At time of outreach call the patient feels as if their condition has (returned to baseline) since initial visit with PCP or specialist.  Questions or concerns regarding recovery period addressed at this time.   Reviewed any PCP or specialists progress notes/labs/radiology reports if applicable and addressed any questions or concerns.  Jyothi Addison LPN

## 2023-09-28 DIAGNOSIS — Z95.2 S/P MVR (MITRAL VALVE REPLACEMENT): Primary | ICD-10-CM

## 2023-10-10 ENCOUNTER — OFFICE VISIT (OUTPATIENT)
Dept: CARDIOLOGY | Facility: CLINIC | Age: 78
End: 2023-10-10
Payer: MEDICARE

## 2023-10-10 VITALS
OXYGEN SATURATION: 99 % | DIASTOLIC BLOOD PRESSURE: 65 MMHG | HEART RATE: 60 BPM | BODY MASS INDEX: 23.97 KG/M2 | WEIGHT: 143.9 LBS | HEIGHT: 65 IN | SYSTOLIC BLOOD PRESSURE: 119 MMHG

## 2023-10-10 DIAGNOSIS — E78.2 MIXED HYPERLIPIDEMIA: ICD-10-CM

## 2023-10-10 DIAGNOSIS — Z95.2 S/P MVR (MITRAL VALVE REPLACEMENT): ICD-10-CM

## 2023-10-10 DIAGNOSIS — Z95.1 S/P CABG (CORONARY ARTERY BYPASS GRAFT): Primary | ICD-10-CM

## 2023-10-10 DIAGNOSIS — I10 PRIMARY HYPERTENSION: ICD-10-CM

## 2023-10-10 PROCEDURE — 3074F SYST BP LT 130 MM HG: CPT | Performed by: INTERNAL MEDICINE

## 2023-10-10 PROCEDURE — 99214 OFFICE O/P EST MOD 30 MIN: CPT | Performed by: INTERNAL MEDICINE

## 2023-10-10 PROCEDURE — 3078F DIAST BP <80 MM HG: CPT | Performed by: INTERNAL MEDICINE

## 2023-10-10 PROCEDURE — 1126F AMNT PAIN NOTED NONE PRSNT: CPT | Performed by: INTERNAL MEDICINE

## 2023-10-10 PROCEDURE — 99214 OFFICE O/P EST MOD 30 MIN: CPT | Mod: PO | Performed by: INTERNAL MEDICINE

## 2023-10-10 PROCEDURE — 1036F TOBACCO NON-USER: CPT | Performed by: INTERNAL MEDICINE

## 2023-10-10 PROCEDURE — 1159F MED LIST DOCD IN RCRD: CPT | Performed by: INTERNAL MEDICINE

## 2023-10-10 PROCEDURE — 1160F RVW MEDS BY RX/DR IN RCRD: CPT | Performed by: INTERNAL MEDICINE

## 2023-10-10 RX ORDER — SPIRONOLACTONE 25 MG/1
25 TABLET ORAL DAILY
Qty: 90 TABLET | Refills: 11 | Status: SHIPPED | OUTPATIENT
Start: 2023-10-10

## 2023-10-10 ASSESSMENT — PAIN SCALES - GENERAL: PAINLEVEL: 0-NO PAIN

## 2023-10-10 ASSESSMENT — ENCOUNTER SYMPTOMS
DEPRESSION: 0
LOSS OF SENSATION IN FEET: 0
OCCASIONAL FEELINGS OF UNSTEADINESS: 0

## 2023-10-10 ASSESSMENT — PATIENT HEALTH QUESTIONNAIRE - PHQ9
SUM OF ALL RESPONSES TO PHQ9 QUESTIONS 1 AND 2: 0
1. LITTLE INTEREST OR PLEASURE IN DOING THINGS: NOT AT ALL
2. FEELING DOWN, DEPRESSED OR HOPELESS: NOT AT ALL

## 2023-10-10 NOTE — PROGRESS NOTES
Stop: IV Contrast and Metformin Administration    Metformin should be held for 48 hours after the administration of iodinated IV contrast AND normal renal function has been confirmed. Based on your site's metformin policy, either document a MAR action of held or call the ordering provider.

## 2023-10-10 NOTE — PROGRESS NOTES
Jimbo Tsang is a 77 y.o. female.    Chief Complaint:  Follow-up    HPI  Recent admit diverticulitis in June.  Still stomach upset, thinks may need more ATBX.  No CP,no dyspnea ,no edema  Last echo 2021  CABG  MVR and TVR 201911-Sep-2019 13:44:00               Procedure Name: 1.Right Axillary bypass with CABG LIMA -LAD  SVG- seq CX- Diag                2. Endoscopic vein harvest Right upper leg               3. Mitral valve repair with #29 Simulus ring               4. Tricuspid valve repair with # 29 Simulus ring                5. Placement of permanent Ventricular pacing wires tunneled to Left ant chest               6.ONESIMO removal and oversewn                  Date: 19-Sep-2019 09:01:00  Renewed spirononlactone.  ROS  Feels weak.    Objective   Physical Exam well-developed female no acute distress.  JVP was flat.  Normal carotid upstrokes no bruits.  Regular rhythm without gallop or murmur.  Clear lungs without crackles.  Soft abdomen without masses.  No dependent edema with intact pedal pulses    Lab Review:   Lab on 09/18/2023   Component Date Value    Color, Urine 09/18/2023 YELLOW     Appearance, Urine 09/18/2023 CLEAR     Specific Gravity, Urine 09/18/2023 1.009     pH, Urine 09/18/2023 5.0     Protein, Urine 09/18/2023 NEGATIVE     Glucose, Urine 09/18/2023 NEGATIVE     Blood, Urine 09/18/2023 SMALL(1+) (A)     Ketones, Urine 09/18/2023 NEGATIVE     Bilirubin, Urine 09/18/2023 NEGATIVE     Urobilinogen, Urine 09/18/2023 <2.0     Nitrite, Urine 09/18/2023 NEGATIVE     Leukocyte Esterase, Urine 09/18/2023 SMALL(1+) (A)     Urine Culture 09/18/2023 Streptococcus agalactiae (Group B Streptococcus) (A)     WBC, Urine 09/18/2023 3     RBC, Urine 09/18/2023 1     Squamous Epithelial Cell* 09/18/2023 <1    Lab on 08/28/2023   Component Date Value    Color, Urine 08/28/2023 YELLOW     Appearance, Urine 08/28/2023 HAZY     Specific Gravity, Urine 08/28/2023 1.020     pH, Urine 08/28/2023 5.0      Protein, Urine 08/28/2023 30(1+) (A)     Glucose, Urine 08/28/2023 NEGATIVE     Blood, Urine 08/28/2023 MODERATE(2+) (A)     Ketones, Urine 08/28/2023 NEGATIVE     Bilirubin, Urine 08/28/2023 NEGATIVE     Urobilinogen, Urine 08/28/2023 <2.0     Nitrite, Urine 08/28/2023 NEGATIVE     Leukocyte Esterase, Urine 08/28/2023 LARGE(3+) (A)     Urine Culture 08/28/2023 **Culture Comments - See Below     Glucose 08/28/2023 107 (H)     Sodium 08/28/2023 139     Potassium 08/28/2023 3.7     Chloride 08/28/2023 101     Bicarbonate 08/28/2023 27     Anion Gap 08/28/2023 15     Urea Nitrogen 08/28/2023 7     Creatinine 08/28/2023 0.88     GFR Female 08/28/2023 68     Calcium 08/28/2023 10.1     WBC, Urine 08/28/2023 20 (A)     RBC, Urine 08/28/2023 9 (A)     Squamous Epithelial Cell* 08/28/2023 3     Bacteria, Urine 08/28/2023 1+ (A)     Mucus, Urine 08/28/2023 3+     Hyaline Casts, Urine 08/28/2023 2+ (A)     Calcium Oxalate Crystals* 08/28/2023 2+ (A)    Lab on 08/17/2023   Component Date Value    WBC 08/17/2023 19.0 (H)     RBC 08/17/2023 4.97     Hemoglobin 08/17/2023 13.2     Hematocrit 08/17/2023 40.4     MCV 08/17/2023 81     MCHC 08/17/2023 32.7     Platelets 08/17/2023 264     RDW 08/17/2023 13.4     Neutrophils % 08/17/2023 44.2     Immature Granulocytes %,* 08/17/2023 0.5     Lymphocytes % 08/17/2023 47.7     Monocytes % 08/17/2023 6.2     Eosinophils % 08/17/2023 0.8     Basophils % 08/17/2023 0.6     Neutrophils Absolute 08/17/2023 8.38 (H)     Lymphocytes Absolute 08/17/2023 9.06 (H)     Monocytes Absolute 08/17/2023 1.18 (H)     Eosinophils Absolute 08/17/2023 0.15     Basophils Absolute 08/17/2023 0.12 (H)     Glucose 08/17/2023 96     Sodium 08/17/2023 134 (L)     Potassium 08/17/2023 4.3     Chloride 08/17/2023 97 (L)     Bicarbonate 08/17/2023 27     Anion Gap 08/17/2023 14     Urea Nitrogen 08/17/2023 14     Creatinine 08/17/2023 1.03     GFR Female 08/17/2023 56 (A)     Calcium 08/17/2023 10.0     Albumin  08/17/2023 4.3     Alkaline Phosphatase 08/17/2023 74     Total Protein 08/17/2023 8.0     AST 08/17/2023 14     Total Bilirubin 08/17/2023 0.6     ALT (SGPT) 08/17/2023 7     CRP 08/17/2023 5.25 (A)     Color, Urine 08/17/2023 STRAW     Appearance, Urine 08/17/2023 CLEAR     Specific Gravity, Urine 08/17/2023 1.004 (L)     pH, Urine 08/17/2023 7.0     Protein, Urine 08/17/2023 NEGATIVE     Glucose, Urine 08/17/2023 NEGATIVE     Blood, Urine 08/17/2023 SMALL(1+) (A)     Ketones, Urine 08/17/2023 NEGATIVE     Bilirubin, Urine 08/17/2023 NEGATIVE     Urobilinogen, Urine 08/17/2023 <2.0     Nitrite, Urine 08/17/2023 NEGATIVE     Leukocyte Esterase, Urine 08/17/2023 TRACE (A)     WBC, Urine 08/17/2023 0-5     RBC, Urine 08/17/2023 0-5     Squamous Epithelial Cell* 08/17/2023 FEW        Assessment/Plan   The primary encounter diagnosis was S/P CABG (coronary artery bypass graft). Diagnoses of Mixed hyperlipidemia, S/P MVR (mitral valve replacement), and Primary hypertension were also pertinent to this visit.  This very pleasant 77-year-old female with a history of SVT, CAD, dual-chamber pacemaker, mitral and tricuspid valve repair with bypass setting of severe MR moderate TR in 2019 postoperative atrial fibrillation.  Last echo in 2021 showing normal valve function.  Stable from a cardiovascular standpoint not manifesting any symptoms suggest angina arrhythmia or active heart failure.  Mildly tender abdominal exam.  Recent treatment for diverticulitis managed by PCP.  No change in drug treatment with office follow-up scheduled in 6 months

## 2023-10-12 ENCOUNTER — APPOINTMENT (OUTPATIENT)
Dept: CARDIOLOGY | Facility: CLINIC | Age: 78
End: 2023-10-12
Payer: MEDICARE

## 2023-10-12 ENCOUNTER — ANTICOAGULATION - WARFARIN VISIT (OUTPATIENT)
Dept: CARDIOLOGY | Facility: CLINIC | Age: 78
End: 2023-10-12
Payer: MEDICARE

## 2023-10-12 DIAGNOSIS — Z95.2 S/P MVR (MITRAL VALVE REPLACEMENT): ICD-10-CM

## 2023-10-12 NOTE — PROGRESS NOTES
PT LEFT VM CANCELLING TODAY'S APPT AND WANTING TO R/S. I CALLED HER BACK. PT RESCHEDULED 10/16/23 AT 0845 PER REQUEST

## 2023-10-16 ENCOUNTER — ANTICOAGULATION - WARFARIN VISIT (OUTPATIENT)
Dept: CARDIOLOGY | Facility: CLINIC | Age: 78
End: 2023-10-16
Payer: MEDICARE

## 2023-10-16 DIAGNOSIS — Z95.2 S/P MVR (MITRAL VALVE REPLACEMENT): ICD-10-CM

## 2023-10-16 LAB
POC INR: 2.3
POC PROTHROMBIN TIME: NORMAL

## 2023-10-16 PROCEDURE — 99211 OFF/OP EST MAY X REQ PHY/QHP: CPT | Performed by: INTERNAL MEDICINE

## 2023-10-16 PROCEDURE — 85610 PROTHROMBIN TIME: CPT | Mod: QW | Performed by: INTERNAL MEDICINE

## 2023-10-16 PROCEDURE — 85610 PROTHROMBIN TIME: CPT | Mod: PO

## 2023-10-16 NOTE — PROGRESS NOTES
Patient identification verified with 2 identifiers.    Location: [facility] [id number] [place] [address] [phone] option #1     Referring Physician: NEVAEH  Enrollment/ Re-enrollment date: 24   INR Goal: 2.0-3.0  INR monitoring is per [facility] protocol.  Anticoagulation Medication: warfarin  Indication:  MITRAL VALVE REPLACEMENT    Subjective   Bleeding signs/symptoms: No    Bruising: No   Major bleeding event: No  Thrombosis signs/symptoms: No  Thromboembolic event: No  Missed doses: No  Extra doses: No  Medication changes: Yes  TOOK LYRICA  Dietary changes: Yes  LOW FIBER DIET  Change in health: No  Change in activity: No  Alcohol: No  Other concerns: No    Upcoming Surgeries:  Does the Patient Have any upcoming surgeries that require interruption in anticoagulation therapy? no  Does the patient require bridging? no      Anticoagulation Summary  As of 10/16/2023      INR goal:  2.0-3.0   TTR:  100.0 % (1.1 wk)   INR used for dosin.30 (10/16/2023)   Weekly warfarin total:  19.5 mg               Assessment/Plan   Therapeutic     1. New dose:  NO CHANGE     2. Next INR: 1 month      Education provided to patient during the visit:  Patient instructed to call in interim with questions, concerns and changes.

## 2023-11-03 ENCOUNTER — HOSPITAL ENCOUNTER (OUTPATIENT)
Dept: CARDIOLOGY | Facility: CLINIC | Age: 78
Discharge: HOME | End: 2023-11-03
Payer: MEDICARE

## 2023-11-03 DIAGNOSIS — I44.2 ATRIOVENTRICULAR BLOCK, COMPLETE (MULTI): ICD-10-CM

## 2023-11-03 DIAGNOSIS — Z95.0 PRESENCE OF CARDIAC PACEMAKER: ICD-10-CM

## 2023-11-08 ENCOUNTER — TELEPHONE (OUTPATIENT)
Dept: PRIMARY CARE | Facility: CLINIC | Age: 78
End: 2023-11-08
Payer: MEDICARE

## 2023-11-08 NOTE — TELEPHONE ENCOUNTER
Called pt and scheduled her for an appt. PT stated that she still feels like she is having a bladder infection and now has back pain. PT would like a U/A lab order to check please advise

## 2023-11-09 DIAGNOSIS — R30.0 DYSURIA: Primary | ICD-10-CM

## 2023-11-13 ENCOUNTER — TELEMEDICINE (OUTPATIENT)
Dept: PRIMARY CARE | Facility: CLINIC | Age: 78
End: 2023-11-13
Payer: MEDICARE

## 2023-11-13 ENCOUNTER — ANTICOAGULATION - WARFARIN VISIT (OUTPATIENT)
Dept: CARDIOLOGY | Facility: CLINIC | Age: 78
End: 2023-11-13
Payer: MEDICARE

## 2023-11-13 DIAGNOSIS — Z87.440 H/O RECURRENT URINARY TRACT INFECTION: Primary | ICD-10-CM

## 2023-11-13 DIAGNOSIS — Z95.2 S/P MVR (MITRAL VALVE REPLACEMENT): Primary | ICD-10-CM

## 2023-11-13 LAB
POC INR: 3.7
POC PROTHROMBIN TIME: NORMAL

## 2023-11-13 PROCEDURE — 85610 PROTHROMBIN TIME: CPT | Mod: QW | Performed by: INTERNAL MEDICINE

## 2023-11-13 PROCEDURE — 99441 PR PHYS/QHP TELEPHONE EVALUATION 5-10 MIN: CPT | Performed by: STUDENT IN AN ORGANIZED HEALTH CARE EDUCATION/TRAINING PROGRAM

## 2023-11-13 PROCEDURE — 99211 OFF/OP EST MAY X REQ PHY/QHP: CPT | Performed by: INTERNAL MEDICINE

## 2023-11-13 NOTE — PROGRESS NOTES
Subjective   Patient ID: Neha Tsang is a 77 y.o. female who presents for UTI (Possible UTI. ).        HPI    Concern for UTI   Pt had called the office on 11/9 , UA was ordered  Yet to be done   Pt has been taking OTC pills     Visit Vitals  Smoking Status Former      No LMP recorded.     Review of Systems    As noted in HPI   All other systems have been reviewed and are negative for complaint       Physical Exam    Telephone visit   Speaks clearly    Assessment/Plan   Diagnoses and all orders for this visit:  H/O recurrent urinary tract infection    Pt was informed of existing order , to go to the lab  Further mgmt pending the results

## 2023-11-13 NOTE — TELEPHONE ENCOUNTER
What day and time can the patient have a VV for her possible UTI? PT stated she did not get U/A because she was not notified the order was in. Please advise

## 2023-11-13 NOTE — PROGRESS NOTES
HOSPITALIST DISCHARGE INSTRUCTIONS  NAME: Hailee Henderson   :  1958   MRN:  [id number]     Date/Time:  2023 11:44 AM    ADMIT DATE: 2023     DISCHARGE DATE: 2023     ADMITTING DIAGNOSIS:  Loculated pleural effusion s/p pleural drain    DISCHARGE DIAGNOSIS:  You were admitted for evaluation and treatment of the above. MEDICATIONS:    It is important that you take the medication exactly as they are prescribed. Keep your medication in the bottles provided by the pharmacist and keep a list of the medication names, dosages, and times to be taken in your wallet. Do not take other medications without consulting your doctor. DIET:  diabetic    ACTIVITY:  as tolerated    OTHER INSTRUCTIONS:    Seek medical attention for fever, increasing cough, shortness of breath, or chest pain. If you experience any of the following symptoms then please call your primary care physician or return to the emergency room if you cannot get hold of your doctor:  Fever, chills, nausea, vomiting, diarrhea, change in mentation, falling, bleeding, shortness of breath    Follow Up:  Please call and set up an appointment to see them in 1 week. Rafi Christian, [address] [place] [phone]          Bolivar Garcia MD  [address]  [phone]    Schedule an appointment as soon as possible for a visit  4-6 weeks for chest CT        Information obtained by :  I understand that if any problems occur once I am at home I am to contact my physician. I understand and acknowledge receipt of the instructions indicated above.                                                                                                                                            Physician's or R.N.'s Signature                                                                  Date/Time Patient identification verified with 2 identifiers.    Location: [facility] at [facility] - [address] [address] [phone] option #1     Referring Physician: Dr Valencia  Enrollment/ Re-enrollment date: 9/5/24   INR Goal: 2.0-3.0  INR monitoring is per [facility] protocol.  Anticoagulation Medication: warfarin  Indication: Mitral Valve Replacement    Subjective   Bleeding signs/symptoms: No    Bruising: No   Major bleeding event: No  Thrombosis signs/symptoms: No  Thromboembolic event: No  Missed doses: No  Extra doses: No  Medication changes: No  Pt has been taking tylenol twice daily for about 10 -14 days  Dietary changes: No  pt ate cranberries last week ( it was a small amount but 3 times)  Change in health: Yes  pt states she thinks she has a UTI  Change in activity: No  Alcohol: No  Other concerns: No    Upcoming Surgeries:  Does the Patient Have any upcoming surgeries that require interruption in anticoagulation therapy? no  Does the patient require bridging? no      Anticoagulation Summary  As of 11/13/2023      INR goal:  2.0-3.0   TTR:  61.6 % (1.2 mo)   INR used for dosing:  3.70 (11/13/2023)   Weekly warfarin total:  19.5 mg               Assessment/Plan   Supratherapeutic     1. New dose: Pt was supposed to take 1.5 mg Thursday, 3 mg all other days but took 3 mg daily. Will hold 1 dose and decrease weekly dose.  2. Next INR: 1 week      Education provided to patient during the visit:  Patient instructed to call in interim with questions, concerns and changes.   Patient educated on interactions between medications and warfarin.   Patient educated on dietary consistency in vitamin k consumption.   Patient educated on signs of bleeding/clotting.         Patient or Representative Signature                                                          Date/Time                                                                                                                                              Patient or Representative Signature                                                          Date/Time      Signed By: Dionte Quintanilla MD     January 26, 2023

## 2023-11-14 ENCOUNTER — LAB (OUTPATIENT)
Dept: LAB | Facility: LAB | Age: 78
End: 2023-11-14
Payer: MEDICARE

## 2023-11-14 DIAGNOSIS — R30.0 DYSURIA: ICD-10-CM

## 2023-11-14 LAB
APPEARANCE UR: CLEAR
BILIRUB UR STRIP.AUTO-MCNC: NEGATIVE MG/DL
COLOR UR: ABNORMAL
GLUCOSE UR STRIP.AUTO-MCNC: NEGATIVE MG/DL
KETONES UR STRIP.AUTO-MCNC: NEGATIVE MG/DL
LEUKOCYTE ESTERASE UR QL STRIP.AUTO: ABNORMAL
MUCOUS THREADS #/AREA URNS AUTO: NORMAL /LPF
NITRITE UR QL STRIP.AUTO: NEGATIVE
PH UR STRIP.AUTO: 6 [PH]
PROT UR STRIP.AUTO-MCNC: NEGATIVE MG/DL
RBC # UR STRIP.AUTO: ABNORMAL /UL
RBC #/AREA URNS AUTO: NORMAL /HPF
SP GR UR STRIP.AUTO: 1
UROBILINOGEN UR STRIP.AUTO-MCNC: <2 MG/DL
WBC #/AREA URNS AUTO: NORMAL /HPF

## 2023-11-14 PROCEDURE — 87086 URINE CULTURE/COLONY COUNT: CPT

## 2023-11-14 PROCEDURE — 81001 URINALYSIS AUTO W/SCOPE: CPT

## 2023-11-15 DIAGNOSIS — N30.00 ACUTE CYSTITIS WITHOUT HEMATURIA: Primary | ICD-10-CM

## 2023-11-15 LAB — BACTERIA UR CULT: NORMAL

## 2023-11-15 RX ORDER — NITROFURANTOIN 25; 75 MG/1; MG/1
100 CAPSULE ORAL 2 TIMES DAILY
Qty: 10 CAPSULE | Refills: 0 | Status: SHIPPED | OUTPATIENT
Start: 2023-11-15 | End: 2023-11-20

## 2023-11-20 ENCOUNTER — APPOINTMENT (OUTPATIENT)
Dept: CARDIOLOGY | Facility: CLINIC | Age: 78
End: 2023-11-20
Payer: MEDICARE

## 2023-11-20 ENCOUNTER — TELEPHONE (OUTPATIENT)
Dept: CARDIOLOGY | Facility: CLINIC | Age: 78
End: 2023-11-20
Payer: MEDICARE

## 2023-11-20 NOTE — TELEPHONE ENCOUNTER
Patient called and left voicemail stating that she was not feeling well and would like to reschedule her appointment., this nurse returned patients call. Patient stated that yesterday was her last day of antibiotics which she was on for 5 days. Patient was on Nitrofurantoin. Patient was rescheduled for tomorrow at 0930.

## 2023-11-21 ENCOUNTER — ANTICOAGULATION - WARFARIN VISIT (OUTPATIENT)
Dept: CARDIOLOGY | Facility: CLINIC | Age: 78
End: 2023-11-21
Payer: MEDICARE

## 2023-11-21 DIAGNOSIS — Z95.2 S/P MVR (MITRAL VALVE REPLACEMENT): ICD-10-CM

## 2023-11-21 LAB
POC INR: 2.6
POC PROTHROMBIN TIME: NORMAL

## 2023-11-21 PROCEDURE — 85610 PROTHROMBIN TIME: CPT | Mod: QW

## 2023-11-21 PROCEDURE — 99211 OFF/OP EST MAY X REQ PHY/QHP: CPT | Performed by: INTERNAL MEDICINE

## 2023-11-21 NOTE — PROGRESS NOTES
Patient identification verified with 2 identifiers.    Location: [facility] at [facility] - [address] [address] [phone] option #1     Referring Physician: Dr Carlos Valencia  Enrollment/ Re-enrollment date: 24   INR Goal: 2.0-3.0  INR monitoring is per [facility] protocol.  Anticoagulation Medication: warfarin  Indication: Mitral Valve Replacement    Subjective   Bleeding signs/symptoms: No    Bruising: No   Major bleeding event: No  Thrombosis signs/symptoms: No  Thromboembolic event: No  Missed doses: No  Extra doses: No  Medication changes: Yes  pt finished antibiotics yesterday  Dietary changes: No  Change in health: No  Change in activity: No  Alcohol: No  Other concerns: No    Upcoming Surgeries:  Does the Patient Have any upcoming surgeries that require interruption in anticoagulation therapy? no  Does the patient require bridging? no      Anticoagulation Summary  As of 2023      INR goal:  2.0-3.0   TTR:  57.0 % (1.5 mo)   INR used for dosin.60 (2023)   Weekly warfarin total:  19.5 mg               Assessment/Plan   Therapeutic     1. New dose: no change  2. Next INR: 1 week      Education provided to patient during the visit:  Patient instructed to call in interim with questions, concerns and changes.   Patient educated on interactions between medications and warfarin.   Patient educated on dietary consistency in vitamin k consumption.   Patient educated on signs of bleeding/clotting.

## 2023-11-24 ENCOUNTER — PATIENT OUTREACH (OUTPATIENT)
Dept: PRIMARY CARE | Facility: CLINIC | Age: 78
End: 2023-11-24
Payer: MEDICARE

## 2023-11-24 NOTE — PROGRESS NOTES
Call placed regarding 90 days post discharge follow up call.  At time of outreach call the patient feels as if their condition has (returned to baseline) since initial visit with PCP or specialist.  Questions or concerns regarding recovery period addressed at this time.   Reviewed any PCP or specialists progress notes/labs/radiology reports if applicable and addressed any questions or concerns.     Jyothi Addison LPN

## 2023-11-28 ENCOUNTER — APPOINTMENT (OUTPATIENT)
Dept: CARDIOLOGY | Facility: CLINIC | Age: 78
End: 2023-11-28
Payer: MEDICARE

## 2023-11-28 ENCOUNTER — ANTICOAGULATION - WARFARIN VISIT (OUTPATIENT)
Dept: CARDIOLOGY | Facility: CLINIC | Age: 78
End: 2023-11-28
Payer: MEDICARE

## 2023-11-28 DIAGNOSIS — Z95.2 S/P MVR (MITRAL VALVE REPLACEMENT): ICD-10-CM

## 2023-11-30 ENCOUNTER — APPOINTMENT (OUTPATIENT)
Dept: RADIOLOGY | Facility: CLINIC | Age: 78
End: 2023-11-30
Payer: MEDICARE

## 2023-12-01 ENCOUNTER — ANTICOAGULATION - WARFARIN VISIT (OUTPATIENT)
Dept: CARDIOLOGY | Facility: CLINIC | Age: 78
End: 2023-12-01
Payer: MEDICARE

## 2023-12-01 ENCOUNTER — TELEPHONE (OUTPATIENT)
Dept: PRIMARY CARE | Facility: CLINIC | Age: 78
End: 2023-12-01

## 2023-12-01 DIAGNOSIS — I10 PRIMARY HYPERTENSION: ICD-10-CM

## 2023-12-01 DIAGNOSIS — Z95.2 S/P MVR (MITRAL VALVE REPLACEMENT): Primary | ICD-10-CM

## 2023-12-01 DIAGNOSIS — I48.3 TYPICAL ATRIAL FLUTTER (MULTI): ICD-10-CM

## 2023-12-01 LAB
POC INR: 2
POC PROTHROMBIN TIME: NORMAL

## 2023-12-01 PROCEDURE — 85610 PROTHROMBIN TIME: CPT | Mod: QW

## 2023-12-01 PROCEDURE — 99211 OFF/OP EST MAY X REQ PHY/QHP: CPT | Performed by: INTERNAL MEDICINE

## 2023-12-01 NOTE — PROGRESS NOTES
Patient identification verified with 2 identifiers.    Location: [facility] at [facility] - [address] [address] [phone] option #1     Referring Physician: Dr Carlos Valencia  Enrollment/ Re-enrollment date: 24   INR Goal: 2.0-3.0  INR monitoring is per [facility] protocol.  Anticoagulation Medication: warfarin  Indication: Mitral Valve Replacement    Subjective   Bleeding signs/symptoms: No    Bruising: No   Major bleeding event: No  Thrombosis signs/symptoms: No  Thromboembolic event: No  Missed doses: No  Extra doses: No  Medication changes: No  Dietary changes: No  Change in health: No  Change in activity: No  Alcohol: No  Other concerns: No    Upcoming Surgeries:  Does the Patient Have any upcoming surgeries that require interruption in anticoagulation therapy? no  Does the patient require bridging? no      Anticoagulation Summary  As of 2023      INR goal:  2.0-3.0   TTR:  65.4 % (1.8 mo)   INR used for dosin.00 (2023)   Weekly warfarin total:  19.5 mg               Assessment/Plan   Therapeutic     1. New dose: no change    2. Next INR: 2 weeks      Education provided to patient during the visit:  Patient instructed to call in interim with questions, concerns and changes.   Patient educated on interactions between medications and warfarin.   Patient educated on compliance with dosing, follow up appointments, and prescribed plan of care.

## 2023-12-01 NOTE — TELEPHONE ENCOUNTER
PT came in and needs her RF on       RX: Amlodipine 10 mg      RX: Metoprolol 50 mg        RX: Lisinopril 20 mg        RX: Warfarin 3 mg        JANKI ARORA

## 2023-12-06 RX ORDER — METOPROLOL TARTRATE 50 MG/1
50 TABLET ORAL EVERY 12 HOURS
Qty: 180 TABLET | Refills: 1 | Status: SHIPPED | OUTPATIENT
Start: 2023-12-06

## 2023-12-06 RX ORDER — AMLODIPINE BESYLATE 10 MG/1
10 TABLET ORAL DAILY
Qty: 90 TABLET | Refills: 1 | Status: SHIPPED | OUTPATIENT
Start: 2023-12-06 | End: 2023-12-13 | Stop reason: SDUPTHER

## 2023-12-06 RX ORDER — LISINOPRIL 20 MG/1
20 TABLET ORAL DAILY
Qty: 90 TABLET | Refills: 1 | Status: SHIPPED | OUTPATIENT
Start: 2023-12-06 | End: 2024-04-23 | Stop reason: SDUPTHER

## 2023-12-06 RX ORDER — WARFARIN 3 MG/1
3 TABLET ORAL EVERY EVENING
Qty: 90 TABLET | Refills: 3 | Status: SHIPPED | OUTPATIENT
Start: 2023-12-06

## 2023-12-08 ENCOUNTER — ANTICOAGULATION - WARFARIN VISIT (OUTPATIENT)
Dept: CARDIOLOGY | Facility: CLINIC | Age: 78
End: 2023-12-08
Payer: MEDICARE

## 2023-12-08 ENCOUNTER — APPOINTMENT (OUTPATIENT)
Dept: CARDIOLOGY | Facility: CLINIC | Age: 78
End: 2023-12-08
Payer: MEDICARE

## 2023-12-08 ENCOUNTER — TELEPHONE (OUTPATIENT)
Dept: CARDIOLOGY | Facility: CLINIC | Age: 78
End: 2023-12-08
Payer: MEDICARE

## 2023-12-08 DIAGNOSIS — Z95.2 S/P MVR (MITRAL VALVE REPLACEMENT): Primary | ICD-10-CM

## 2023-12-12 ENCOUNTER — ANTICOAGULATION - WARFARIN VISIT (OUTPATIENT)
Dept: CARDIOLOGY | Facility: CLINIC | Age: 78
End: 2023-12-12
Payer: MEDICARE

## 2023-12-12 DIAGNOSIS — Z95.2 S/P MVR (MITRAL VALVE REPLACEMENT): ICD-10-CM

## 2023-12-12 LAB
POC INR: 3.1
POC PROTHROMBIN TIME: NORMAL

## 2023-12-12 PROCEDURE — 99211 OFF/OP EST MAY X REQ PHY/QHP: CPT | Mod: 27 | Performed by: INTERNAL MEDICINE

## 2023-12-12 PROCEDURE — 85610 PROTHROMBIN TIME: CPT | Mod: QW

## 2023-12-12 NOTE — PROGRESS NOTES
Patient identification verified with 2 identifiers.    Location: [facility] at [facility] - [address] [address] [phone] option #1     Referring Physician: DR. FARAZ HERRING  Enrollment/ Re-enrollment date: 9/5/2024   INR Goal: 2.0-3.0  INR monitoring is per [facility] protocol.  Anticoagulation Medication: warfarin  Indication: Mitral Valve Replacement    Subjective   Bleeding signs/symptoms: No    Bruising: No   Major bleeding event: No  Thrombosis signs/symptoms: No  Thromboembolic event: No  Missed doses: No  Extra doses: No  Medication changes: Yes  PT RESTARTED GABAPENTIN LAST WEEK  Dietary changes: Yes  PT HASN'T BEEN EATING ANY GREEN VEGETABLES. SHE WILL EAT SOME TWICE PER WEEK  Change in health: No  Change in activity: No  Alcohol: No  Other concerns: No    Upcoming Surgeries:  Does the Patient Have any upcoming surgeries that require interruption in anticoagulation therapy? no  Does the patient require bridging? no      Anticoagulation Summary  As of 12/12/2023      INR goal:  2.0-3.0   TTR:  69.6 % (2.2 mo)   INR used for dosing:  3.10 (12/12/2023)   Weekly warfarin total:  19.5 mg               Assessment/Plan   Supratherapeutic     1. New dose: no change    2. Next INR: 1 week      Education provided to patient during the visit:  Patient instructed to call in interim with questions, concerns and changes.   Patient educated on interactions between medications and warfarin.   Patient educated on dietary consistency in vitamin k consumption.   Patient educated on affects of alcohol consumption while taking warfarin.   Patient educated on signs of bleeding/clotting.   Patient educated on compliance with dosing, follow up appointments, and prescribed plan of care.

## 2023-12-13 DIAGNOSIS — E78.2 MIXED HYPERLIPIDEMIA: Primary | ICD-10-CM

## 2023-12-13 DIAGNOSIS — I10 PRIMARY HYPERTENSION: ICD-10-CM

## 2023-12-13 RX ORDER — ATORVASTATIN CALCIUM 40 MG/1
40 TABLET, FILM COATED ORAL DAILY
Qty: 90 TABLET | Refills: 3 | Status: SHIPPED | OUTPATIENT
Start: 2023-12-13

## 2023-12-13 RX ORDER — AMLODIPINE BESYLATE 10 MG/1
10 TABLET ORAL DAILY
Qty: 90 TABLET | Refills: 1 | Status: SHIPPED | OUTPATIENT
Start: 2023-12-13

## 2023-12-18 ENCOUNTER — OFFICE VISIT (OUTPATIENT)
Dept: PRIMARY CARE | Facility: CLINIC | Age: 78
End: 2023-12-18
Payer: MEDICARE

## 2023-12-18 ENCOUNTER — LAB (OUTPATIENT)
Dept: LAB | Facility: LAB | Age: 78
End: 2023-12-18
Payer: MEDICARE

## 2023-12-18 VITALS
SYSTOLIC BLOOD PRESSURE: 130 MMHG | OXYGEN SATURATION: 97 % | BODY MASS INDEX: 24.43 KG/M2 | DIASTOLIC BLOOD PRESSURE: 70 MMHG | WEIGHT: 146.6 LBS | HEART RATE: 88 BPM | HEIGHT: 65 IN

## 2023-12-18 DIAGNOSIS — Z87.440 H/O RECURRENT URINARY TRACT INFECTION: ICD-10-CM

## 2023-12-18 DIAGNOSIS — E78.2 MIXED HYPERLIPIDEMIA: Primary | ICD-10-CM

## 2023-12-18 DIAGNOSIS — I21.A9 OTHER MYOCARDIAL INFARCTION TYPE (MULTI): ICD-10-CM

## 2023-12-18 DIAGNOSIS — Z95.1 S/P CABG (CORONARY ARTERY BYPASS GRAFT): ICD-10-CM

## 2023-12-18 DIAGNOSIS — I10 PRIMARY HYPERTENSION: ICD-10-CM

## 2023-12-18 DIAGNOSIS — Z95.0 PACEMAKER: ICD-10-CM

## 2023-12-18 DIAGNOSIS — E78.2 MIXED HYPERLIPIDEMIA: ICD-10-CM

## 2023-12-18 LAB
APPEARANCE UR: CLEAR
BILIRUB UR STRIP.AUTO-MCNC: NEGATIVE MG/DL
CHOLEST SERPL-MCNC: 124 MG/DL (ref 0–199)
CHOLESTEROL/HDL RATIO: 2.9
COLOR UR: YELLOW
EST. AVERAGE GLUCOSE BLD GHB EST-MCNC: 117 MG/DL
GLUCOSE UR STRIP.AUTO-MCNC: NEGATIVE MG/DL
HBA1C MFR BLD: 5.7 %
HDLC SERPL-MCNC: 42.9 MG/DL
HOLD SPECIMEN: NORMAL
HYALINE CASTS #/AREA URNS AUTO: ABNORMAL /LPF
KETONES UR STRIP.AUTO-MCNC: NEGATIVE MG/DL
LDLC SERPL CALC-MCNC: 59 MG/DL
LEUKOCYTE ESTERASE UR QL STRIP.AUTO: ABNORMAL
MUCOUS THREADS #/AREA URNS AUTO: ABNORMAL /LPF
NITRITE UR QL STRIP.AUTO: NEGATIVE
NON HDL CHOLESTEROL: 81 MG/DL (ref 0–149)
PH UR STRIP.AUTO: 5 [PH]
PROT UR STRIP.AUTO-MCNC: NEGATIVE MG/DL
RBC # UR STRIP.AUTO: ABNORMAL /UL
RBC #/AREA URNS AUTO: ABNORMAL /HPF
SP GR UR STRIP.AUTO: 1.01
SQUAMOUS #/AREA URNS AUTO: ABNORMAL /HPF
TRIGL SERPL-MCNC: 111 MG/DL (ref 0–149)
TSH SERPL-ACNC: 1.27 MIU/L (ref 0.44–3.98)
UROBILINOGEN UR STRIP.AUTO-MCNC: <2 MG/DL
VLDL: 22 MG/DL (ref 0–40)
WBC #/AREA URNS AUTO: ABNORMAL /HPF

## 2023-12-18 PROCEDURE — 81001 URINALYSIS AUTO W/SCOPE: CPT

## 2023-12-18 PROCEDURE — 84443 ASSAY THYROID STIM HORMONE: CPT

## 2023-12-18 PROCEDURE — 1159F MED LIST DOCD IN RCRD: CPT | Performed by: STUDENT IN AN ORGANIZED HEALTH CARE EDUCATION/TRAINING PROGRAM

## 2023-12-18 PROCEDURE — 3075F SYST BP GE 130 - 139MM HG: CPT | Performed by: STUDENT IN AN ORGANIZED HEALTH CARE EDUCATION/TRAINING PROGRAM

## 2023-12-18 PROCEDURE — 1126F AMNT PAIN NOTED NONE PRSNT: CPT | Performed by: STUDENT IN AN ORGANIZED HEALTH CARE EDUCATION/TRAINING PROGRAM

## 2023-12-18 PROCEDURE — 36415 COLL VENOUS BLD VENIPUNCTURE: CPT

## 2023-12-18 PROCEDURE — 3078F DIAST BP <80 MM HG: CPT | Performed by: STUDENT IN AN ORGANIZED HEALTH CARE EDUCATION/TRAINING PROGRAM

## 2023-12-18 PROCEDURE — 83036 HEMOGLOBIN GLYCOSYLATED A1C: CPT

## 2023-12-18 PROCEDURE — 1160F RVW MEDS BY RX/DR IN RCRD: CPT | Performed by: STUDENT IN AN ORGANIZED HEALTH CARE EDUCATION/TRAINING PROGRAM

## 2023-12-18 PROCEDURE — 1036F TOBACCO NON-USER: CPT | Performed by: STUDENT IN AN ORGANIZED HEALTH CARE EDUCATION/TRAINING PROGRAM

## 2023-12-18 PROCEDURE — 87086 URINE CULTURE/COLONY COUNT: CPT

## 2023-12-18 PROCEDURE — 99214 OFFICE O/P EST MOD 30 MIN: CPT | Performed by: STUDENT IN AN ORGANIZED HEALTH CARE EDUCATION/TRAINING PROGRAM

## 2023-12-18 PROCEDURE — 80061 LIPID PANEL: CPT

## 2023-12-18 NOTE — PROGRESS NOTES
"Subjective   Patient ID: Neha Tsang is a 78 y.o. female who presents for Follow-up (Follow-up. ).        HPI    76 y/o female with HTN, HPL , CAD s/p CABG , mitral and tricuspid valve repair , atrial flutter ablation and pacemaker placement in 2019, ON Ac with coumadin clinic , recurrent diverticulitis , advanced DJD L spine       Fu care of chronic medical conditions       Visit Vitals  /70   Pulse 88   Ht 1.651 m (5' 5\")   Wt 66.5 kg (146 lb 9.6 oz)   SpO2 97%   BMI 24.40 kg/m²   Smoking Status Former   BSA 1.75 m²      No LMP recorded.     Review of Systems    Constitutional : No feeling poorly / fevers/ chills / night sweats/ fatigue   Cardiovascular : No CP /Palpitations/ lower extremity edema / syncope   Respiratory : No Cough /LARA/Dyspnea at rest   Gastrointestinal : No abd pain / N/V  No bloody stools/ melena / constipation  Endo : No polyuria/polydipsia/ muscle weakness / sluggishness   CNS: No confusion / HA/ tingling/ numbness/ weakness of extremities  Psychiatric: No anxiety/ depression/ SI/HI    All other systems have been reviewed and are negative for complaint       Physical Exam    Constitutional : Vitals reviewed. Alert and in no distress  Cardiovascular : RRR, Normal S1, S2, No pericardial rub/ gallop, no peripheral edema   Pulmonary: No respiratory distress, CTAB   MSK : Normal gait and station , strength and tone     Neurologic : CNs 2-12 grossly intact , no obvious FNDs  Psych : A,Ox3, normal mood and affect      Assessment/Plan   Diagnoses and all orders for this visit:  Mixed hyperlipidemia  -     Lipid Panel; Future  -     TSH with reflex to Free T4 if abnormal; Future  Primary hypertension  S/P CABG (coronary artery bypass graft)  Other myocardial infarction type (CMS/HCC)  -     Hemoglobin A1C; Future  Pacemaker  -     Referral to Cardiology; Future  H/O recurrent urinary tract infection  -     Urinalysis with Reflex Microscopic and Culture; Future      76 y/o female with " HTN, HPL , CAD s/p CABG , mitral and tricuspid valve repair , atrial flutter ablation and pacemaker placement in 2019, ON Ac with coumadin clinic , recurrent diverticulitis , advanced DJD L spine       EP referral for pacemaker mgmt   H/o recurrent URI , pt asymptomatic today , will get UA when she is devoid of sx     Conditions addressed and mgmt as noted above.  Pertinent labs, images/ imaging reports , chart review was done .   Age appropriate labs / labs for mgmt of chronic medical conditions ordered, further mgmt pending the results.          RTO  in 6m or sooner if need to

## 2023-12-19 ENCOUNTER — APPOINTMENT (OUTPATIENT)
Dept: CARDIOLOGY | Facility: CLINIC | Age: 78
End: 2023-12-19
Payer: MEDICARE

## 2023-12-19 ENCOUNTER — ANTICOAGULATION - WARFARIN VISIT (OUTPATIENT)
Dept: CARDIOLOGY | Facility: CLINIC | Age: 78
End: 2023-12-19
Payer: MEDICARE

## 2023-12-19 DIAGNOSIS — Z95.2 S/P MVR (MITRAL VALVE REPLACEMENT): ICD-10-CM

## 2023-12-19 LAB — BACTERIA UR CULT: NORMAL

## 2023-12-20 ENCOUNTER — HOSPITAL ENCOUNTER (OUTPATIENT)
Dept: CARDIOLOGY | Facility: CLINIC | Age: 78
Discharge: HOME | End: 2023-12-20
Payer: MEDICARE

## 2023-12-20 DIAGNOSIS — Z95.0 PACEMAKER: ICD-10-CM

## 2023-12-20 DIAGNOSIS — I44.2 ATRIOVENTRICULAR BLOCK, COMPLETE (MULTI): ICD-10-CM

## 2023-12-22 ENCOUNTER — ANTICOAGULATION - WARFARIN VISIT (OUTPATIENT)
Dept: CARDIOLOGY | Facility: CLINIC | Age: 78
End: 2023-12-22
Payer: MEDICARE

## 2023-12-22 DIAGNOSIS — Z95.2 S/P MVR (MITRAL VALVE REPLACEMENT): ICD-10-CM

## 2023-12-22 LAB
POC INR: 2.2
POC PROTHROMBIN TIME: NORMAL

## 2023-12-22 PROCEDURE — 99211 OFF/OP EST MAY X REQ PHY/QHP: CPT | Performed by: INTERNAL MEDICINE

## 2023-12-22 PROCEDURE — 85610 PROTHROMBIN TIME: CPT | Mod: QW

## 2023-12-22 NOTE — PROGRESS NOTES
Patient identification verified with 2 identifiers.    Location: [facility] at [facility] - [address] [address] [phone] option #1     Referring Physician: DR. FARAZ HERRING  Enrollment/ Re-enrollment date: 2024   INR Goal: 2.0-3.0  INR monitoring is per [facility] protocol.  Anticoagulation Medication: warfarin  Indication: Mitral Valve Replacement    Subjective   Bleeding signs/symptoms: No    Bruising: No   Major bleeding event: No  Thrombosis signs/symptoms: No  Thromboembolic event: No  Missed doses: No  Extra doses: No  Medication changes: No  Dietary changes: No  Change in health: No  Change in activity: No  Alcohol: No  Other concerns: No    Upcoming Surgeries:  Does the Patient Have any upcoming surgeries that require interruption in anticoagulation therapy? no  Does the patient require bridging? no      Anticoagulation Summary  As of 2023      INR goal:  2.0-3.0   TTR:  72.1 % (2.5 mo)   INR used for dosin.20 (2023)   Weekly warfarin total:  19.5 mg               Assessment/Plan   Therapeutic    1. New dose: no change    2. Next INR: 4 weeks      Education provided to patient during the visit:  Patient instructed to call in interim with questions, concerns and changes.   Patient educated on interactions between medications and warfarin.   Patient educated on dietary consistency in vitamin k consumption.   Patient educated on affects of alcohol consumption while taking warfarin.   Patient educated on signs of bleeding/clotting.   Patient educated on compliance with dosing, follow up appointments, and prescribed plan of care.

## 2024-01-02 ENCOUNTER — APPOINTMENT (OUTPATIENT)
Dept: CARDIOLOGY | Facility: CLINIC | Age: 79
End: 2024-01-02
Payer: MEDICARE

## 2024-01-16 ENCOUNTER — ANTICOAGULATION - WARFARIN VISIT (OUTPATIENT)
Dept: CARDIOLOGY | Facility: CLINIC | Age: 79
End: 2024-01-16
Payer: MEDICARE

## 2024-01-16 DIAGNOSIS — Z95.2 S/P MVR (MITRAL VALVE REPLACEMENT): Primary | ICD-10-CM

## 2024-01-16 LAB
POC INR: 1.7
POC PROTHROMBIN TIME: NORMAL

## 2024-01-16 PROCEDURE — 99211 OFF/OP EST MAY X REQ PHY/QHP: CPT | Performed by: INTERNAL MEDICINE

## 2024-01-16 PROCEDURE — 85610 PROTHROMBIN TIME: CPT | Mod: QW

## 2024-01-16 NOTE — PROGRESS NOTES
Patient identification verified with 2 identifiers.    Location: [facility] at [facility] - [address] [address] [phone] option #1     Referring Physician: DR. HERRING  Enrollment/ Re-enrollment date: 24   INR Goal: 2.0-3.0  INR monitoring is per [facility] protocol.  Anticoagulation Medication: warfarin  Indication: Mitral Valve Replacement    Subjective   Bleeding signs/symptoms: No    Bruising: No   Major bleeding event: No  Thrombosis signs/symptoms: No  Thromboembolic event: No  Missed doses: Yes  PT DID NOT TAKE WARFARIN LAST NIGHT.  Extra doses: No  Medication changes: Yes  PT WAS ON 5 DAY COURSE OF ATB FOR DIVERTICULITIS.  LAST DOSE WAS 24.  Dietary changes: No  Change in health: No  Change in activity: No  Alcohol: No  Other concerns: No    Upcoming Surgeries:  Does the Patient Have any upcoming surgeries that require interruption in anticoagulation therapy? no  Does the patient require bridging? no      Anticoagulation Summary  As of 2024      INR goal:  2.0-3.0   TTR:  64.1 % (3.3 mo)   INR used for dosin.70 (2024)   Weekly warfarin total:  19.5 mg               Assessment/Plan   Subtherapeutic     1. New dose: no change  PT WILL TAKE AN EXTRA HALF TABLET OF WARFARIN TODAY.   2. Next INR: 1 week      Education provided to patient during the visit:  Patient instructed to call in interim with questions, concerns and changes.   Patient educated on interactions between medications and warfarin.   Patient educated on dietary consistency in vitamin k consumption.   Patient educated on affects of alcohol consumption while taking warfarin.   Patient educated on signs of bleeding/clotting.   Patient educated on compliance with dosing, follow up appointments, and prescribed plan of care.

## 2024-01-23 ENCOUNTER — ANTICOAGULATION - WARFARIN VISIT (OUTPATIENT)
Dept: CARDIOLOGY | Facility: CLINIC | Age: 79
End: 2024-01-23
Payer: MEDICARE

## 2024-01-23 ENCOUNTER — APPOINTMENT (OUTPATIENT)
Dept: CARDIOLOGY | Facility: CLINIC | Age: 79
End: 2024-01-23
Payer: MEDICARE

## 2024-01-23 DIAGNOSIS — Z95.2 S/P MVR (MITRAL VALVE REPLACEMENT): ICD-10-CM

## 2024-01-25 ENCOUNTER — TELEPHONE (OUTPATIENT)
Dept: GASTROENTEROLOGY | Facility: HOSPITAL | Age: 79
End: 2024-01-25
Payer: MEDICARE

## 2024-01-25 DIAGNOSIS — K59.00 CONSTIPATION, UNSPECIFIED CONSTIPATION TYPE: Primary | ICD-10-CM

## 2024-01-25 RX ORDER — DOCUSATE SODIUM 100 MG/1
100 CAPSULE, LIQUID FILLED ORAL 2 TIMES DAILY PRN
Qty: 60 CAPSULE | Refills: 11 | Status: SHIPPED | OUTPATIENT
Start: 2024-01-25 | End: 2024-04-19 | Stop reason: HOSPADM

## 2024-01-25 NOTE — TELEPHONE ENCOUNTER
Mi/Kris Grajeda./Veyo  [facility] S[facility] 100[facility]  Take 1 capsule by mouth twice daily as needed

## 2024-01-26 ENCOUNTER — ANTICOAGULATION - WARFARIN VISIT (OUTPATIENT)
Dept: CARDIOLOGY | Facility: CLINIC | Age: 79
End: 2024-01-26
Payer: MEDICARE

## 2024-01-26 DIAGNOSIS — Z95.2 S/P MVR (MITRAL VALVE REPLACEMENT): ICD-10-CM

## 2024-01-26 LAB
POC INR: 2.4
POC PROTHROMBIN TIME: NORMAL

## 2024-01-26 PROCEDURE — 85610 PROTHROMBIN TIME: CPT | Mod: QW

## 2024-01-26 PROCEDURE — 99211 OFF/OP EST MAY X REQ PHY/QHP: CPT | Performed by: INTERNAL MEDICINE

## 2024-01-26 NOTE — PROGRESS NOTES
Location: [facility] at [facility] - [address] [address] [phone] option #1     Referring Physician: DR. HERRING  Enrollment/ Re-enrollment date: 24   INR Goal: 2.0-3.0  INR monitoring is per [facility] protocol.  Anticoagulation Medication: warfarin  Indication: Mitral Valve Replacement    Subjective   Bleeding signs/symptoms: No    Bruising: No   Major bleeding event: No  Thrombosis signs/symptoms: No  Thromboembolic event: No  Missed doses: No  Extra doses: No  Medication changes: No  Dietary changes: No  Change in health: No  Change in activity: No  Alcohol: No  Other concerns: No    Upcoming Surgeries:  Does the Patient Have any upcoming surgeries that require interruption in anticoagulation therapy? no  Does the patient require bridging? no      Anticoagulation Summary  As of 2024      INR goal:  2.0-3.0   TTR:  63.5 % (3.7 mo)   INR used for dosin.40 (2024)   Weekly warfarin total:  19.5 mg               Assessment/Plan   THERAPEUTIC    1. New dose: no change  PT WILL MAINTAIN TWD OF WARFARIN.  2. Next INR: 4 WEEKS      Education provided to patient during the visit:  Patient instructed to call in interim with questions, concerns and changes.   Patient educated on interactions between medications and warfarin.   Patient educated on dietary consistency in vitamin k consumption.   Patient educated on affects of alcohol consumption while taking warfarin.   Patient educated on signs of bleeding/clotting.   Patient educated on compliance with dosing, follow up appointments, and prescribed plan of care.

## 2024-02-09 ENCOUNTER — ANTICOAGULATION - WARFARIN VISIT (OUTPATIENT)
Dept: CARDIOLOGY | Facility: CLINIC | Age: 79
End: 2024-02-09
Payer: MEDICARE

## 2024-02-09 DIAGNOSIS — Z95.2 S/P MVR (MITRAL VALVE REPLACEMENT): ICD-10-CM

## 2024-02-09 LAB
POC INR: 3.3
POC PROTHROMBIN TIME: NORMAL

## 2024-02-09 PROCEDURE — 85610 PROTHROMBIN TIME: CPT | Mod: QW

## 2024-02-09 PROCEDURE — 99211 OFF/OP EST MAY X REQ PHY/QHP: CPT | Performed by: INTERNAL MEDICINE

## 2024-02-09 NOTE — PROGRESS NOTES
Location: [facility] at [facility] - [address] [address] [phone] option #1     Referring Physician: DR. HERRING  Enrollment/ Re-enrollment date: 9/5/24   INR Goal: 2.0-3.0  INR monitoring is per [facility] protocol.  Anticoagulation Medication: warfarin  Indication: Mitral Valve Replacement    Subjective   Bleeding signs/symptoms: No    Bruising: No   Major bleeding event: No  Thrombosis signs/symptoms: No  Thromboembolic event: No  Missed doses: No  Extra doses: No  Medication changes: No  Dietary changes: No  Change in health: No  Change in activity: No  Alcohol: No  Other concerns: No    Upcoming Surgeries:  Does the Patient Have any upcoming surgeries that require interruption in anticoagulation therapy? no  Does the patient require bridging? no      Anticoagulation Summary  As of 2/9/2024      INR goal:  2.0-3.0   TTR:  63.9 % (4.1 mo)   INR used for dosing:  3.30 (2/9/2024)   Weekly warfarin total:  19.5 mg               Assessment/Plan   SUPRA THERAPEUTIC    1. New dose: no change  PT WILL MAINTAIN TWD OF WARFARIN AND INCREASE CONSUMPTION OF GREEN VEGETABLES. PT WOULD LIKE TO HAVE MORE GREEN VEGGIES IN HER DIET.  2. Next INR: 1 WEEKS      Education provided to patient during the visit:  Patient instructed to call in interim with questions, concerns and changes.   Patient educated on interactions between medications and warfarin.   Patient educated on dietary consistency in vitamin k consumption.   Patient educated on affects of alcohol consumption while taking warfarin.   Patient educated on signs of bleeding/clotting.   Patient educated on compliance with dosing, follow up appointments, and prescribed plan of care.

## 2024-02-16 ENCOUNTER — ANTICOAGULATION - WARFARIN VISIT (OUTPATIENT)
Dept: CARDIOLOGY | Facility: CLINIC | Age: 79
End: 2024-02-16
Payer: MEDICARE

## 2024-02-16 DIAGNOSIS — Z95.2 S/P MVR (MITRAL VALVE REPLACEMENT): Primary | ICD-10-CM

## 2024-02-16 LAB
POC INR: 2.2
POC PROTHROMBIN TIME: NORMAL

## 2024-02-16 PROCEDURE — 99211 OFF/OP EST MAY X REQ PHY/QHP: CPT | Performed by: INTERNAL MEDICINE

## 2024-02-16 PROCEDURE — 85610 PROTHROMBIN TIME: CPT | Mod: QW

## 2024-02-16 NOTE — PROGRESS NOTES
Location: [facility] at [facility] - [address] [address] [phone] option #1     Referring Physician: DR. HERRING  Enrollment/ Re-enrollment date: 24   INR Goal: 2.0-3.0  INR monitoring is per [facility] protocol.  Anticoagulation Medication: warfarin  Indication: Mitral Valve Replacement    Subjective   Bleeding signs/symptoms: No    Bruising: No   Major bleeding event: No  Thrombosis signs/symptoms: No  Thromboembolic event: No  Missed doses: No  Extra doses: No  Medication changes: No  Dietary changes: No  Change in health: No  Change in activity: No  Alcohol: No  Other concerns: No    Upcoming Procedures:  Does the Patient Have any upcoming procedures that require interruption in anticoagulation therapy? no  Does the patient require bridging? no      Anticoagulation Summary  As of 2024      INR goal:  2.0-3.0   TTR:  64.3 % (4.4 mo)   INR used for dosin.20 (2024)   Weekly warfarin total:  19.5 mg               Assessment/Plan   Therapeutic     1. New dose: Will maintain dose and patient will return in 2 weeks.    2. Next INR: 2 weeks      Education provided to patient during the visit:  Patient instructed to call in interim with questions, concerns and changes.

## 2024-03-01 ENCOUNTER — ANTICOAGULATION - WARFARIN VISIT (OUTPATIENT)
Dept: CARDIOLOGY | Facility: CLINIC | Age: 79
End: 2024-03-01
Payer: MEDICARE

## 2024-03-01 DIAGNOSIS — Z95.2 S/P MVR (MITRAL VALVE REPLACEMENT): Primary | ICD-10-CM

## 2024-03-01 LAB
POC INR: 2.2
POC PROTHROMBIN TIME: NORMAL

## 2024-03-01 PROCEDURE — 99211 OFF/OP EST MAY X REQ PHY/QHP: CPT

## 2024-03-01 PROCEDURE — 85610 PROTHROMBIN TIME: CPT | Mod: QW

## 2024-03-01 NOTE — PROGRESS NOTES
Location: [facility] at [facility] - [address] [address] [phone] option #1     Referring Physician: DR. HERRING  Enrollment/ Re-enrollment date: 24   INR Goal: 2.0-3.0  INR monitoring is per [facility] protocol.  Anticoagulation Medication: warfarin  Indication: Mitral Valve Replacement    Subjective   Bleeding signs/symptoms: No    Bruising: No   Major bleeding event: No  Thrombosis signs/symptoms: No  Thromboembolic event: No  Missed doses: No  Extra doses: No  Medication changes: No  Dietary changes: No  Change in health: No  Change in activity: No  Alcohol: No  Other concerns: No    Upcoming Procedures:  Does the Patient Have any upcoming procedures that require interruption in anticoagulation therapy? no  Does the patient require bridging? no      Anticoagulation Summary  As of 3/1/2024      INR goal:  2.0-3.0   TTR:  67.8 % (4.8 mo)   INR used for dosin.20 (3/1/2024)   Weekly warfarin total:  19.5 mg               Assessment/Plan   Therapeutic     1. New dose: no change  2. Next INR: 2 weeks      Education provided to patient during the visit:  Patient instructed to call in interim with questions, concerns and changes.   Patient educated on interactions between medications and warfarin.   Patient educated on dietary consistency in vitamin k consumption.   Patient educated on affects of alcohol consumption while taking warfarin.   Patient educated on signs of bleeding/clotting.   Patient educated on compliance with dosing, follow up appointments, and prescribed plan of care.

## 2024-03-11 ENCOUNTER — HOSPITAL ENCOUNTER (OUTPATIENT)
Dept: CARDIOLOGY | Facility: CLINIC | Age: 79
Discharge: HOME | End: 2024-03-11
Payer: MEDICARE

## 2024-03-11 DIAGNOSIS — Z95.0 PRESENCE OF CARDIAC PACEMAKER: ICD-10-CM

## 2024-03-11 DIAGNOSIS — I44.2 ATRIOVENTRICULAR BLOCK, COMPLETE (MULTI): ICD-10-CM

## 2024-03-11 PROCEDURE — 93294 REM INTERROG EVL PM/LDLS PM: CPT | Performed by: INTERNAL MEDICINE

## 2024-03-11 PROCEDURE — 93296 REM INTERROG EVL PM/IDS: CPT

## 2024-03-15 ENCOUNTER — ANTICOAGULATION - WARFARIN VISIT (OUTPATIENT)
Dept: CARDIOLOGY | Facility: CLINIC | Age: 79
End: 2024-03-15
Payer: MEDICARE

## 2024-03-15 DIAGNOSIS — Z95.2 S/P MVR (MITRAL VALVE REPLACEMENT): Primary | ICD-10-CM

## 2024-03-15 DIAGNOSIS — K21.9 GASTROESOPHAGEAL REFLUX DISEASE WITHOUT ESOPHAGITIS: Primary | ICD-10-CM

## 2024-03-15 DIAGNOSIS — K21.9 GASTROESOPHAGEAL REFLUX DISEASE WITHOUT ESOPHAGITIS: ICD-10-CM

## 2024-03-15 LAB
POC INR: 2.3
POC PROTHROMBIN TIME: NORMAL

## 2024-03-15 PROCEDURE — 85610 PROTHROMBIN TIME: CPT | Mod: QW

## 2024-03-15 PROCEDURE — 99211 OFF/OP EST MAY X REQ PHY/QHP: CPT

## 2024-03-15 RX ORDER — OMEPRAZOLE 40 MG/1
40 CAPSULE, DELAYED RELEASE ORAL DAILY
Qty: 30 CAPSULE | Refills: 11 | Status: SHIPPED | OUTPATIENT
Start: 2024-03-15 | End: 2024-03-15

## 2024-03-15 RX ORDER — OMEPRAZOLE 40 MG/1
CAPSULE, DELAYED RELEASE ORAL
Qty: 90 CAPSULE | Refills: 3 | Status: SHIPPED | OUTPATIENT
Start: 2024-03-15 | End: 2024-04-19 | Stop reason: HOSPADM

## 2024-03-15 NOTE — PROGRESS NOTES
Patient identification verified with 2 identifiers.    Location: [facility] at [facility] - [address] [address] [phone] option #1     Referring Physician: Dr. Carlos Valencia  Enrollment/ Re-enrollment date: 24   INR Goal: 2.0-3.0  INR monitoring is per [facility] protocol.  Anticoagulation Medication: warfarin  Indication: Mitral Valve Replacement    Subjective   Bleeding signs/symptoms: No    Bruising: No   Major bleeding event: No  Thrombosis signs/symptoms: No  Thromboembolic event: No  Missed doses: No  Extra doses: No  Medication changes: No  Dietary changes: No  Change in health: No  Change in activity: No  Alcohol: No  Other concerns: No    Upcoming Procedures:  Does the Patient Have any upcoming procedures that require interruption in anticoagulation therapy? no  Does the patient require bridging? no      Anticoagulation Summary  As of 3/15/2024      INR goal:  2.0-3.0   TTR:  70.7 % (5.3 mo)   INR used for dosin.30 (3/15/2024)   Weekly warfarin total:  19.5 mg               Assessment/Plan   Therapeutic     1. New dose: no change    2. Next INR:  4 weeks.       Education provided to patient during the visit:  Patient instructed to call in interim with questions, concerns and changes.   Patient educated on interactions between medications and warfarin.   Patient educated on dietary consistency in vitamin k consumption.   Patient educated on affects of alcohol consumption while taking warfarin.   Patient educated on signs of bleeding/clotting.

## 2024-03-25 ENCOUNTER — HOSPITAL ENCOUNTER (OUTPATIENT)
Dept: CARDIOLOGY | Facility: CLINIC | Age: 79
Discharge: HOME | End: 2024-03-25
Payer: MEDICARE

## 2024-03-25 DIAGNOSIS — Z95.0 PRESENCE OF CARDIAC PACEMAKER: ICD-10-CM

## 2024-03-25 DIAGNOSIS — I44.2 ATRIOVENTRICULAR BLOCK, COMPLETE (MULTI): ICD-10-CM

## 2024-04-09 ENCOUNTER — HOSPITAL ENCOUNTER (OUTPATIENT)
Dept: CARDIOLOGY | Facility: CLINIC | Age: 79
Discharge: HOME | End: 2024-04-09
Payer: MEDICARE

## 2024-04-09 DIAGNOSIS — Z95.0 PRESENCE OF CARDIAC PACEMAKER: ICD-10-CM

## 2024-04-09 DIAGNOSIS — I44.2 ATRIOVENTRICULAR BLOCK, COMPLETE (MULTI): ICD-10-CM

## 2024-04-12 ENCOUNTER — OFFICE VISIT (OUTPATIENT)
Dept: CARDIOLOGY | Facility: CLINIC | Age: 79
End: 2024-04-12
Payer: MEDICARE

## 2024-04-12 ENCOUNTER — ANTICOAGULATION - WARFARIN VISIT (OUTPATIENT)
Dept: CARDIOLOGY | Facility: CLINIC | Age: 79
End: 2024-04-12
Payer: MEDICARE

## 2024-04-12 VITALS
HEIGHT: 65 IN | WEIGHT: 145.19 LBS | BODY MASS INDEX: 24.19 KG/M2 | DIASTOLIC BLOOD PRESSURE: 75 MMHG | SYSTOLIC BLOOD PRESSURE: 146 MMHG | OXYGEN SATURATION: 98 % | HEART RATE: 60 BPM

## 2024-04-12 DIAGNOSIS — Z95.2 S/P MVR (MITRAL VALVE REPLACEMENT): ICD-10-CM

## 2024-04-12 DIAGNOSIS — I10 PRIMARY HYPERTENSION: Primary | ICD-10-CM

## 2024-04-12 LAB
POC INR: 1.8
POC PROTHROMBIN TIME: NORMAL

## 2024-04-12 PROCEDURE — 99214 OFFICE O/P EST MOD 30 MIN: CPT | Performed by: INTERNAL MEDICINE

## 2024-04-12 PROCEDURE — 93010 ELECTROCARDIOGRAM REPORT: CPT | Performed by: INTERNAL MEDICINE

## 2024-04-12 PROCEDURE — 1036F TOBACCO NON-USER: CPT | Performed by: INTERNAL MEDICINE

## 2024-04-12 PROCEDURE — 1157F ADVNC CARE PLAN IN RCRD: CPT | Performed by: INTERNAL MEDICINE

## 2024-04-12 PROCEDURE — 85610 PROTHROMBIN TIME: CPT | Mod: QW

## 2024-04-12 PROCEDURE — 93005 ELECTROCARDIOGRAM TRACING: CPT | Performed by: INTERNAL MEDICINE

## 2024-04-12 PROCEDURE — 3077F SYST BP >= 140 MM HG: CPT | Performed by: INTERNAL MEDICINE

## 2024-04-12 PROCEDURE — 3078F DIAST BP <80 MM HG: CPT | Performed by: INTERNAL MEDICINE

## 2024-04-12 PROCEDURE — 99211 OFF/OP EST MAY X REQ PHY/QHP: CPT | Mod: 25

## 2024-04-12 PROCEDURE — 1159F MED LIST DOCD IN RCRD: CPT | Performed by: INTERNAL MEDICINE

## 2024-04-12 ASSESSMENT — PATIENT HEALTH QUESTIONNAIRE - PHQ9
1. LITTLE INTEREST OR PLEASURE IN DOING THINGS: NOT AT ALL
2. FEELING DOWN, DEPRESSED OR HOPELESS: NOT AT ALL
SUM OF ALL RESPONSES TO PHQ9 QUESTIONS 1 AND 2: 0

## 2024-04-12 ASSESSMENT — COLUMBIA-SUICIDE SEVERITY RATING SCALE - C-SSRS
6. HAVE YOU EVER DONE ANYTHING, STARTED TO DO ANYTHING, OR PREPARED TO DO ANYTHING TO END YOUR LIFE?: NO
2. HAVE YOU ACTUALLY HAD ANY THOUGHTS OF KILLING YOURSELF?: NO
1. IN THE PAST MONTH, HAVE YOU WISHED YOU WERE DEAD OR WISHED YOU COULD GO TO SLEEP AND NOT WAKE UP?: NO

## 2024-04-12 ASSESSMENT — ENCOUNTER SYMPTOMS
LOSS OF SENSATION IN FEET: 0
OCCASIONAL FEELINGS OF UNSTEADINESS: 0
DEPRESSION: 0

## 2024-04-12 NOTE — PROGRESS NOTES
Patient identification verified with 2 identifiers.    Location: [facility] - [address] [address] [phone] option #1     Referring Physician: Dr. Carlos Valencia  Enrollment/ Re-enrollment date: 24   INR Goal: 2.0-3.0  INR monitoring is per AMS protocol.  Anticoagulation Medication: warfarin  Indication: Mitral Valve Replacement    Subjective   Bleeding signs/symptoms: No    Bruising: No   Major bleeding event: No  Thrombosis signs/symptoms: No  Thromboembolic event: No  Missed doses: No  Extra doses: No  Medication changes: No  Dietary changes: Yes  Pt had increase in vit k veggies  Change in health: No  Change in activity: No  Alcohol: No  Other concerns: No    Upcoming Procedures:  Does the Patient Have any upcoming procedures that require interruption in anticoagulation therapy? no  Does the patient require bridging? no      Anticoagulation Summary  As of 2024      INR goal:  2.0-3.0   TTR:  69.1% (6.2 mo)   INR used for dosin.80 (2024)   Weekly warfarin total:  19.5 mg               Assessment/Plan   Subtherapeutic     1. New dose:  Pt will take one time additional tablet today only      2. Next INR: 1 week      Education provided to patient during the visit:  Patient instructed to call in interim with questions, concerns and changes.   Patient educated on interactions between medications and warfarin.   Patient educated on dietary consistency in vitamin k consumption.   Patient educated on affects of alcohol consumption while taking warfarin.   Patient educated on signs of bleeding/clotting.   Patient educated on compliance with dosing, follow up appointments, and prescribed plan of care.

## 2024-04-12 NOTE — PROGRESS NOTES
Primary Care Physician: Octavia Marques MD  Date of Visit: 04/12/2024  9:00 AM EDT  Location of visit: [facility] [place] [address]     Chief Complaint:   Chief Complaint   Patient presents with    Follow-up    Hyperlipidemia    Hypertension        HPI / Summary:   Neha Tsang is a 78 y.o. female presents for followup.   September 2019 surgery, MV repair 29 stimulus ring, tricuspid repair 29 stimulus ring, oversewing of left atrial appendage and removal, right axillary bypass, LIMA to LAD, SVG to circumflex    April 2021 echocardiogram normal EF mild MR, mild TR, mildly elevated RVSP,  Dual-chamber pacemaker implanted around the time of her surgery.  GI symptoms 2/2 constipation, diarrhea.  Tired, seems a bit adrift/bored  No cp, edema, PND, orthopnea, wearing compression.  Salt person-self described.    No recent surgeries or hospitalizations  8/2023 diverticulitis in sigmoid.      Specialty Problems          Cardiology Problems    Complete heart block (Multi)    Hyperlipidemia    Hypertension    S/P CABG (coronary artery bypass graft)    S/P MVR (mitral valve replacement)    Typical atrial flutter (Multi)    CAD (coronary artery disease)    Mitral regurgitation    Pacemaker    Tricuspid regurgitation        Past Medical History:   Diagnosis Date    Cough, unspecified 11/12/2019    Cough    Essential (primary) hypertension 11/18/2022    Hypertension    Personal history of colonic polyps     History of colonic polyps    Personal history of other diseases of the circulatory system 08/25/2020    History of coronary artery disease    Personal history of other diseases of the musculoskeletal system and connective tissue 08/25/2020    History of osteoarthritis    Pneumonia, unspecified organism 06/01/2015    Community acquired pneumonia          Past Surgical History:   Procedure Laterality Date    TUBAL LIGATION  06/15/2015    Tubal Ligation          Social History:   reports that she quit smoking about 41 years  "ago. Her smoking use included cigarettes. She has never used smokeless tobacco. She reports that she does not currently use alcohol. She reports current drug use. Drug: Marijuana.      Allergies:  Allergies   Allergen Reactions    Diethyltoluamide Unknown    Pneumococcal 13-Valent Conjugate To Diphtheria Crm Hives and Other     Fever    Shellfish Containing Products Swelling     Facial swelling, throat walling    Shellfish Derived Unknown    Sulfa (Sulfonamide Antibiotics) Hives       Outpatient Medications:  Current Outpatient Medications   Medication Instructions    acetaminophen (Tylenol) 325 mg tablet oral, Every 4 hours PRN    amLODIPine (NORVASC) 10 mg, oral, Daily    aspirin 81 mg EC tablet oral    atorvastatin (LIPITOR) 40 mg, oral, Daily    calcium carbonate-vitamin D3 600 mg-20 mcg (800 unit) tablet 1 tablet, oral, Daily    cholecalciferol (Vitamin D-3) 125 MCG (5000 UT) capsule oral    docusate sodium (COLACE) 100 mg, oral, 2 times daily PRN    ferrous sulfate 325 (65 Fe) MG tablet 65 mg of iron, oral    gabapentin (NEURONTIN) 300 mg, oral, Nightly    lisinopril 20 mg, oral, Daily    magnesium oxide (Mag-Ox) 400 mg (241.3 mg magnesium) tablet 1 tablet, oral, Daily    metoprolol tartrate (LOPRESSOR) 50 mg, oral, Every 12 hours    omeprazole (PriLOSEC) 40 mg DR capsule TAKE 1 CAPSULE(40 MG) BY MOUTH DAILY. DO NOT CRUSH OR CHEW    sodium chloride (Ocean) 0.65 % nasal spray 1 spray, nasal, As needed    spironolactone (ALDACTONE) 25 mg, oral, Daily    warfarin (COUMADIN) 3 mg, oral, Every evening, Take as directed       ROS     Physical Exam:  Vitals:    04/12/24 0831   BP: 146/75   BP Location: Left arm   Patient Position: Sitting   BP Cuff Size: Adult   Pulse: 60   SpO2: 98%   Weight: 65.9 kg (145 lb 3 oz)   Height: 1.651 m (5' 5\")     Wt Readings from Last 5 Encounters:   04/12/24 65.9 kg (145 lb 3 oz)   12/18/23 66.5 kg (146 lb 9.6 oz)   10/10/23 65.3 kg (143 lb 14.4 oz)   09/18/23 66.6 kg (146 lb 12.8 oz) "   08/28/23 65 kg (143 lb 3.2 oz)     Body mass index is 24.16 kg/m².     Well-developed well-nourished female in no acute distress.  Flat JVP.  Normal carotid upstrokes no bruits.  Regular rhythm without gallop or murmur.  Lungs are clear without crackles or wheezes.  Abdomen soft without aneurysms masses or bruits.  No dependent edema with intact pedal pulses  Last Labs:  CMP:  Recent Labs     08/28/23  1804 08/25/23  0448 08/24/23  0459 08/23/23  0855 08/22/23  0522    CANCELED 137 136 138   K 3.7 CANCELED 3.9 3.4* 4.0    CANCELED 105 104 105   CO2 27 CANCELED 24 23 25   ANIONGAP 15 CANCELED 12 12 12   BUN 7 CANCELED 6 5* 8   CREATININE 0.88 CANCELED 0.86 0.87 0.98   GLUCOSE 107* CANCELED 101* 177* 105*     Recent Labs     08/22/23  0522 08/21/23  1123 08/17/23  1236 01/03/23  1044 10/05/22  1102   ALBUMIN 3.6 4.2 4.3 4.2 4.2   ALKPHOS 51 62 74 89 77   ALT 6* 8 7 21 18   AST 13 15 14 27 24   BILITOT 0.5 0.6 0.6 0.5 0.7   LIPASE  --  48  --  56 58     CBC:  Recent Labs     08/25/23  0448 08/24/23  0459 08/23/23  0855 08/22/23  0522 08/21/23  1123   WBC CANCELED 12.2* 12.0* 11.1 15.9*   HGB CANCELED 13.4 13.0 12.6 13.7   HCT CANCELED 40.8 39.3 39.2 42.1   PLT CANCELED 215 221 221 261   MCV CANCELED 81 80 83 82     COAG:   Recent Labs     03/15/24  0917 03/01/24  0000 02/16/24  0000 02/09/24  0914 01/26/24  0922   INR 2.30 2.20 2.20 3.30 2.40     HEME/ENDO:  Recent Labs     12/18/23  1103 03/13/22  1036 09/23/21  1035 06/11/21  0909 10/23/19  0144 09/08/19  0502 08/22/19  0844   FERRITIN  --   --   --   --  38  --   --    IRONSAT  --   --  16*  --  6*  --   --    TSH 1.27 1.25  --  0.95  --  1.25  --    HGBA1C 5.7*  --   --   --   --   --  5.4      CARDIAC:   Recent Labs     01/03/23  1044 10/22/22  1758 10/22/22  1627 10/05/22  1102 03/13/22  1036 09/07/19  1056 06/25/19  1118   TROPHS 6 13 9 11  --   --   --    BNP  --   --  57 142* 74 172* 223*     Recent Labs     12/18/23  1103 09/08/19  0502   CHOL  124 182   LDLF  --  118*   HDL 42.9 34.2*   TRIG 111 151*       Last Cardiology Tests:  ECG:    Sinus rhythm, atrial paced rhythm no pathologic Q waves no acute ST or T wave changes  Echo:  Echo Results:  No results found for this or any previous visit from the past 3650 days.       Cath:      Stress Test:  Stress Results:  No results found for this or any previous visit from the past 365 days.         Cardiac Imaging:  XR abdomen 1 view  Narrative: Interpreted By:  JAVI HANSEN MD  MRN: [id number]  Patient Name: VIOLETA SEVILLA     STUDY:  ABDOMEN AP VIEW;  8/23/2023 2:24 pm     INDICATION:  sharp, intermittent spasms LLQ .     COMPARISON:  CT scan abdomen and pelvis dated 08/21/2023.     ACCESSION NUMBER(S):  [id number]     ORDERING CLINICIAN:  ZULLY HAWKINS     TECHNIQUE:     2 supine views of the abdomen and pelvis were obtained.     FINDINGS:  There was   moderate diffuse retained colonic stool and gas..  There was no abnormal small bowel dilatation.  There was no gross organomegaly.  Small calcified uterine fibroid centrally in the pelvis. Multiple  bilateral pelvic phleboliths. Advanced aortoiliac calcifications.  No destructive bone lesion. Mild proximal lumbar rotatory  dextroscoliosis.  The extreme lung bases were clear. Previous heart surgery including  valve replacements. Cardiac pacemaker on the left.     Impression: Moderate diffuse retained colonic stool. Nonobstructive intestinal  gas pattern.     Previous heart surgery including valve replacement. Cardiac pacemaker  on the left     Advanced aortoiliac calcifications.     MACRO:  None        Assessment/Plan     5 years out from her TVR MVR and bypass.  Clinically doing well.  Will follow-up with us in 6 months to reassess with another echocardiogram in 2025      Orders:  No orders of the defined types were placed in this encounter.     Followup Appts:  Future Appointments   Date Time Provider Department Center   4/12/2024 10:00 AM [facility]  [id number] [place]           ____________________________________________________________  Carlos Valencia MD    Senior Attending Physician  [facility]  [facility]

## 2024-04-15 LAB
ATRIAL RATE: 60 BPM
P AXIS: 85 DEGREES
P OFFSET: 197 MS
P ONSET: 163 MS
PR INTERVAL: 122 MS
Q ONSET: 224 MS
QRS COUNT: 10 BEATS
QRS DURATION: 76 MS
QT INTERVAL: 408 MS
QTC CALCULATION(BAZETT): 408 MS
QTC FREDERICIA: 408 MS
R AXIS: 24 DEGREES
T AXIS: 65 DEGREES
T OFFSET: 428 MS
VENTRICULAR RATE: 60 BPM

## 2024-04-17 ENCOUNTER — APPOINTMENT (OUTPATIENT)
Dept: CARDIOLOGY | Facility: HOSPITAL | Age: 79
DRG: 391 | End: 2024-04-17
Payer: MEDICARE

## 2024-04-17 ENCOUNTER — APPOINTMENT (OUTPATIENT)
Dept: RADIOLOGY | Facility: HOSPITAL | Age: 79
DRG: 391 | End: 2024-04-17
Payer: MEDICARE

## 2024-04-17 ENCOUNTER — HOSPITAL ENCOUNTER (INPATIENT)
Facility: HOSPITAL | Age: 79
LOS: 2 days | Discharge: HOME | DRG: 391 | End: 2024-04-19
Attending: GENERAL PRACTICE | Admitting: INTERNAL MEDICINE
Payer: MEDICARE

## 2024-04-17 DIAGNOSIS — K65.1 INTRA-ABDOMINAL ABSCESS (MULTI): ICD-10-CM

## 2024-04-17 DIAGNOSIS — A41.9 SEPSIS (MULTI): ICD-10-CM

## 2024-04-17 DIAGNOSIS — K57.92 DIVERTICULITIS: Primary | ICD-10-CM

## 2024-04-17 LAB
ALBUMIN SERPL BCP-MCNC: 4.6 G/DL (ref 3.4–5)
ALP SERPL-CCNC: 75 U/L (ref 33–136)
ALT SERPL W P-5'-P-CCNC: 9 U/L (ref 7–45)
ANION GAP SERPL CALC-SCNC: 17 MMOL/L (ref 10–20)
APPEARANCE UR: CLEAR
AST SERPL W P-5'-P-CCNC: 26 U/L (ref 9–39)
BASOPHILS # BLD AUTO: 0.08 X10*3/UL (ref 0–0.1)
BASOPHILS NFR BLD AUTO: 0.4 %
BILIRUB SERPL-MCNC: 0.5 MG/DL (ref 0–1.2)
BILIRUB UR STRIP.AUTO-MCNC: NEGATIVE MG/DL
BUN SERPL-MCNC: 20 MG/DL (ref 6–23)
CALCIUM SERPL-MCNC: 10.4 MG/DL (ref 8.6–10.3)
CARDIAC TROPONIN I PNL SERPL HS: 6 NG/L (ref 0–13)
CHLORIDE SERPL-SCNC: 106 MMOL/L (ref 98–107)
CO2 SERPL-SCNC: 20 MMOL/L (ref 21–32)
COLOR UR: COLORLESS
CREAT SERPL-MCNC: 0.97 MG/DL (ref 0.5–1.05)
EGFRCR SERPLBLD CKD-EPI 2021: 61 ML/MIN/1.73M*2
EOSINOPHIL # BLD AUTO: 0.13 X10*3/UL (ref 0–0.4)
EOSINOPHIL NFR BLD AUTO: 0.7 %
ERYTHROCYTE [DISTWIDTH] IN BLOOD BY AUTOMATED COUNT: 14.7 % (ref 11.5–14.5)
FLUAV RNA RESP QL NAA+PROBE: NOT DETECTED
FLUBV RNA RESP QL NAA+PROBE: NOT DETECTED
GLUCOSE SERPL-MCNC: 108 MG/DL (ref 74–99)
GLUCOSE UR STRIP.AUTO-MCNC: NORMAL MG/DL
HCT VFR BLD AUTO: 44.3 % (ref 36–46)
HGB BLD-MCNC: 14.1 G/DL (ref 12–16)
IMM GRANULOCYTES # BLD AUTO: 0.06 X10*3/UL (ref 0–0.5)
IMM GRANULOCYTES NFR BLD AUTO: 0.3 % (ref 0–0.9)
INR PPP: 2.1 (ref 0.9–1.1)
KETONES UR STRIP.AUTO-MCNC: ABNORMAL MG/DL
LEUKOCYTE ESTERASE UR QL STRIP.AUTO: ABNORMAL
LIPASE SERPL-CCNC: 65 U/L (ref 9–82)
LYMPHOCYTES # BLD AUTO: 5.06 X10*3/UL (ref 0.8–3)
LYMPHOCYTES NFR BLD AUTO: 28.3 %
MAGNESIUM SERPL-MCNC: 2 MG/DL (ref 1.6–2.4)
MCH RBC QN AUTO: 26.8 PG (ref 26–34)
MCHC RBC AUTO-ENTMCNC: 31.8 G/DL (ref 32–36)
MCV RBC AUTO: 84 FL (ref 80–100)
MONOCYTES # BLD AUTO: 1.15 X10*3/UL (ref 0.05–0.8)
MONOCYTES NFR BLD AUTO: 6.4 %
NEUTROPHILS # BLD AUTO: 11.42 X10*3/UL (ref 1.6–5.5)
NEUTROPHILS NFR BLD AUTO: 63.9 %
NITRITE UR QL STRIP.AUTO: NEGATIVE
NRBC BLD-RTO: 0 /100 WBCS (ref 0–0)
PH UR STRIP.AUTO: 6 [PH]
PLATELET # BLD AUTO: 227 X10*3/UL (ref 150–450)
POTASSIUM SERPL-SCNC: 5.5 MMOL/L (ref 3.5–5.3)
PROT SERPL-MCNC: 8.2 G/DL (ref 6.4–8.2)
PROT UR STRIP.AUTO-MCNC: ABNORMAL MG/DL
PROTHROMBIN TIME: 23.9 SECONDS (ref 9.8–12.8)
RBC # BLD AUTO: 5.26 X10*6/UL (ref 4–5.2)
RBC # UR STRIP.AUTO: ABNORMAL /UL
RBC #/AREA URNS AUTO: ABNORMAL /HPF
SARS-COV-2 RNA RESP QL NAA+PROBE: NOT DETECTED
SODIUM SERPL-SCNC: 137 MMOL/L (ref 136–145)
SP GR UR STRIP.AUTO: >1.05
SQUAMOUS #/AREA URNS AUTO: ABNORMAL /HPF
UROBILINOGEN UR STRIP.AUTO-MCNC: NORMAL MG/DL
WBC # BLD AUTO: 17.9 X10*3/UL (ref 4.4–11.3)
WBC #/AREA URNS AUTO: ABNORMAL /HPF

## 2024-04-17 PROCEDURE — 2500000004 HC RX 250 GENERAL PHARMACY W/ HCPCS (ALT 636 FOR OP/ED): Performed by: GENERAL PRACTICE

## 2024-04-17 PROCEDURE — 99221 1ST HOSP IP/OBS SF/LOW 40: CPT

## 2024-04-17 PROCEDURE — 96365 THER/PROPH/DIAG IV INF INIT: CPT

## 2024-04-17 PROCEDURE — 71260 CT THORAX DX C+: CPT | Performed by: RADIOLOGY

## 2024-04-17 PROCEDURE — 36415 COLL VENOUS BLD VENIPUNCTURE: CPT | Performed by: GENERAL PRACTICE

## 2024-04-17 PROCEDURE — 84484 ASSAY OF TROPONIN QUANT: CPT | Performed by: GENERAL PRACTICE

## 2024-04-17 PROCEDURE — 93005 ELECTROCARDIOGRAM TRACING: CPT

## 2024-04-17 PROCEDURE — 83735 ASSAY OF MAGNESIUM: CPT | Performed by: GENERAL PRACTICE

## 2024-04-17 PROCEDURE — 84075 ASSAY ALKALINE PHOSPHATASE: CPT | Performed by: GENERAL PRACTICE

## 2024-04-17 PROCEDURE — 81001 URINALYSIS AUTO W/SCOPE: CPT | Performed by: GENERAL PRACTICE

## 2024-04-17 PROCEDURE — 87636 SARSCOV2 & INF A&B AMP PRB: CPT | Performed by: GENERAL PRACTICE

## 2024-04-17 PROCEDURE — 85025 COMPLETE CBC W/AUTO DIFF WBC: CPT | Performed by: GENERAL PRACTICE

## 2024-04-17 PROCEDURE — 87086 URINE CULTURE/COLONY COUNT: CPT | Mod: AHULAB | Performed by: GENERAL PRACTICE

## 2024-04-17 PROCEDURE — 2500000004 HC RX 250 GENERAL PHARMACY W/ HCPCS (ALT 636 FOR OP/ED): Performed by: INTERNAL MEDICINE

## 2024-04-17 PROCEDURE — 74177 CT ABD & PELVIS W/CONTRAST: CPT | Performed by: RADIOLOGY

## 2024-04-17 PROCEDURE — 71045 X-RAY EXAM CHEST 1 VIEW: CPT | Performed by: RADIOLOGY

## 2024-04-17 PROCEDURE — 83690 ASSAY OF LIPASE: CPT | Performed by: GENERAL PRACTICE

## 2024-04-17 PROCEDURE — 74177 CT ABD & PELVIS W/CONTRAST: CPT

## 2024-04-17 PROCEDURE — 99285 EMERGENCY DEPT VISIT HI MDM: CPT | Mod: 25

## 2024-04-17 PROCEDURE — 96375 TX/PRO/DX INJ NEW DRUG ADDON: CPT

## 2024-04-17 PROCEDURE — 1200000002 HC GENERAL ROOM WITH TELEMETRY DAILY

## 2024-04-17 PROCEDURE — 85610 PROTHROMBIN TIME: CPT | Performed by: GENERAL PRACTICE

## 2024-04-17 PROCEDURE — 71045 X-RAY EXAM CHEST 1 VIEW: CPT

## 2024-04-17 PROCEDURE — 2550000001 HC RX 255 CONTRASTS: Performed by: GENERAL PRACTICE

## 2024-04-17 RX ORDER — DIPHENHYDRAMINE HYDROCHLORIDE 50 MG/ML
25 INJECTION INTRAMUSCULAR; INTRAVENOUS ONCE
Status: COMPLETED | OUTPATIENT
Start: 2024-04-17 | End: 2024-04-17

## 2024-04-17 RX ORDER — MORPHINE SULFATE 2 MG/ML
2 INJECTION, SOLUTION INTRAMUSCULAR; INTRAVENOUS EVERY 4 HOURS PRN
Status: DISCONTINUED | OUTPATIENT
Start: 2024-04-17 | End: 2024-04-19 | Stop reason: HOSPADM

## 2024-04-17 RX ORDER — VANCOMYCIN HYDROCHLORIDE 1 G/200ML
1000 INJECTION, SOLUTION INTRAVENOUS ONCE
Status: COMPLETED | OUTPATIENT
Start: 2024-04-17 | End: 2024-04-17

## 2024-04-17 RX ORDER — TALC
3 POWDER (GRAM) TOPICAL NIGHTLY PRN
Status: DISCONTINUED | OUTPATIENT
Start: 2024-04-17 | End: 2024-04-19 | Stop reason: HOSPADM

## 2024-04-17 RX ORDER — TRAMADOL HYDROCHLORIDE 50 MG/1
50 TABLET ORAL EVERY 6 HOURS PRN
Status: DISCONTINUED | OUTPATIENT
Start: 2024-04-17 | End: 2024-04-19 | Stop reason: HOSPADM

## 2024-04-17 RX ORDER — VANCOMYCIN HYDROCHLORIDE 1 G/20ML
INJECTION, POWDER, LYOPHILIZED, FOR SOLUTION INTRAVENOUS DAILY PRN
Status: DISCONTINUED | OUTPATIENT
Start: 2024-04-17 | End: 2024-04-17

## 2024-04-17 RX ORDER — ACETAMINOPHEN 325 MG/1
650 TABLET ORAL EVERY 6 HOURS PRN
Status: DISCONTINUED | OUTPATIENT
Start: 2024-04-17 | End: 2024-04-19 | Stop reason: HOSPADM

## 2024-04-17 RX ORDER — PANTOPRAZOLE SODIUM 40 MG/1
40 TABLET, DELAYED RELEASE ORAL
Status: DISCONTINUED | OUTPATIENT
Start: 2024-04-18 | End: 2024-04-19 | Stop reason: HOSPADM

## 2024-04-17 RX ORDER — FENTANYL CITRATE 50 UG/ML
50 INJECTION, SOLUTION INTRAMUSCULAR; INTRAVENOUS ONCE
Status: COMPLETED | OUTPATIENT
Start: 2024-04-17 | End: 2024-04-17

## 2024-04-17 RX ORDER — ONDANSETRON HYDROCHLORIDE 2 MG/ML
4 INJECTION, SOLUTION INTRAVENOUS EVERY 6 HOURS PRN
Status: DISCONTINUED | OUTPATIENT
Start: 2024-04-17 | End: 2024-04-19 | Stop reason: HOSPADM

## 2024-04-17 RX ADMIN — DIPHENHYDRAMINE HYDROCHLORIDE 25 MG: 50 INJECTION, SOLUTION INTRAMUSCULAR; INTRAVENOUS at 19:06

## 2024-04-17 RX ADMIN — VANCOMYCIN HYDROCHLORIDE 1000 MG: 1 INJECTION, SOLUTION INTRAVENOUS at 17:52

## 2024-04-17 RX ADMIN — FENTANYL CITRATE 50 MCG: 50 INJECTION, SOLUTION INTRAMUSCULAR; INTRAVENOUS at 17:20

## 2024-04-17 RX ADMIN — PIPERACILLIN SODIUM AND TAZOBACTAM SODIUM 3.38 G: 3; .375 INJECTION, SOLUTION INTRAVENOUS at 22:00

## 2024-04-17 RX ADMIN — IOHEXOL 75 ML: 350 INJECTION, SOLUTION INTRAVENOUS at 15:31

## 2024-04-17 RX ADMIN — SODIUM CHLORIDE 1000 ML: 9 INJECTION, SOLUTION INTRAVENOUS at 14:46

## 2024-04-17 RX ADMIN — PIPERACILLIN SODIUM AND TAZOBACTAM SODIUM 3.38 G: 3; .375 INJECTION, SOLUTION INTRAVENOUS at 17:20

## 2024-04-17 SDOH — SOCIAL STABILITY: SOCIAL INSECURITY: DO YOU FEEL UNSAFE GOING BACK TO THE PLACE WHERE YOU ARE LIVING?: NO

## 2024-04-17 SDOH — SOCIAL STABILITY: SOCIAL INSECURITY: WERE YOU ABLE TO COMPLETE ALL THE BEHAVIORAL HEALTH SCREENINGS?: YES

## 2024-04-17 SDOH — SOCIAL STABILITY: SOCIAL INSECURITY: ABUSE: ADULT

## 2024-04-17 SDOH — SOCIAL STABILITY: SOCIAL INSECURITY: ARE YOU OR HAVE YOU BEEN THREATENED OR ABUSED PHYSICALLY, EMOTIONALLY, OR SEXUALLY BY ANYONE?: NO

## 2024-04-17 SDOH — SOCIAL STABILITY: SOCIAL INSECURITY: ARE THERE ANY APPARENT SIGNS OF INJURIES/BEHAVIORS THAT COULD BE RELATED TO ABUSE/NEGLECT?: NO

## 2024-04-17 SDOH — SOCIAL STABILITY: SOCIAL INSECURITY: DO YOU FEEL ANYONE HAS EXPLOITED OR TAKEN ADVANTAGE OF YOU FINANCIALLY OR OF YOUR PERSONAL PROPERTY?: NO

## 2024-04-17 SDOH — SOCIAL STABILITY: SOCIAL INSECURITY: DOES ANYONE TRY TO KEEP YOU FROM HAVING/CONTACTING OTHER FRIENDS OR DOING THINGS OUTSIDE YOUR HOME?: NO

## 2024-04-17 SDOH — SOCIAL STABILITY: SOCIAL INSECURITY: HAVE YOU HAD ANY THOUGHTS OF HARMING ANYONE ELSE?: NO

## 2024-04-17 SDOH — SOCIAL STABILITY: SOCIAL INSECURITY: HAS ANYONE EVER THREATENED TO HURT YOUR FAMILY OR YOUR PETS?: NO

## 2024-04-17 SDOH — SOCIAL STABILITY: SOCIAL INSECURITY: HAVE YOU HAD THOUGHTS OF HARMING ANYONE ELSE?: NO

## 2024-04-17 ASSESSMENT — LIFESTYLE VARIABLES
AUDIT-C TOTAL SCORE: 0
SUBSTANCE_ABUSE_PAST_12_MONTHS: YES
HOW MANY STANDARD DRINKS CONTAINING ALCOHOL DO YOU HAVE ON A TYPICAL DAY: PATIENT DOES NOT DRINK
HOW OFTEN DO YOU HAVE 6 OR MORE DRINKS ON ONE OCCASION: NEVER
PRESCIPTION_ABUSE_PAST_12_MONTHS: NO
HOW OFTEN DO YOU HAVE A DRINK CONTAINING ALCOHOL: NEVER
AUDIT-C TOTAL SCORE: 0
SKIP TO QUESTIONS 9-10: 1

## 2024-04-17 ASSESSMENT — ACTIVITIES OF DAILY LIVING (ADL)
ASSISTIVE_DEVICE: EYEGLASSES
TOILETING: INDEPENDENT
WALKS IN HOME: INDEPENDENT
FEEDING YOURSELF: INDEPENDENT
JUDGMENT_ADEQUATE_SAFELY_COMPLETE_DAILY_ACTIVITIES: YES
HEARING - LEFT EAR: FUNCTIONAL
GROOMING: INDEPENDENT
DRESSING YOURSELF: INDEPENDENT
HEARING - RIGHT EAR: FUNCTIONAL
ADEQUATE_TO_COMPLETE_ADL: YES
LACK_OF_TRANSPORTATION: PATIENT DECLINED
PATIENT'S MEMORY ADEQUATE TO SAFELY COMPLETE DAILY ACTIVITIES?: YES
BATHING: INDEPENDENT

## 2024-04-17 ASSESSMENT — PAIN DESCRIPTION - LOCATION: LOCATION: ABDOMEN

## 2024-04-17 ASSESSMENT — COLUMBIA-SUICIDE SEVERITY RATING SCALE - C-SSRS
2. HAVE YOU ACTUALLY HAD ANY THOUGHTS OF KILLING YOURSELF?: NO
6. HAVE YOU EVER DONE ANYTHING, STARTED TO DO ANYTHING, OR PREPARED TO DO ANYTHING TO END YOUR LIFE?: NO
1. IN THE PAST MONTH, HAVE YOU WISHED YOU WERE DEAD OR WISHED YOU COULD GO TO SLEEP AND NOT WAKE UP?: NO

## 2024-04-17 ASSESSMENT — PATIENT HEALTH QUESTIONNAIRE - PHQ9
1. LITTLE INTEREST OR PLEASURE IN DOING THINGS: NOT AT ALL
SUM OF ALL RESPONSES TO PHQ9 QUESTIONS 1 & 2: 0
2. FEELING DOWN, DEPRESSED OR HOPELESS: NOT AT ALL

## 2024-04-17 ASSESSMENT — PAIN - FUNCTIONAL ASSESSMENT: PAIN_FUNCTIONAL_ASSESSMENT: 0-10

## 2024-04-17 ASSESSMENT — PAIN SCALES - GENERAL: PAINLEVEL_OUTOF10: 5 - MODERATE PAIN

## 2024-04-17 NOTE — ED PROVIDER NOTES
HPI   Chief Complaint   Patient presents with    Weakness, Gen    Chest Pain    Diarrhea       HPI: 78-year-old female with a history of CAD status post CABG, mitral valve repair, tricuspid repair, HLD, HTN presents for abdominal cramping and diarrhea.  She was reportedly diagnosed with diverticulitis within the past several weeks.  Over the past several days she has had intermittent nonbloody diarrhea which worsened today.  She reports more than 6 episodes of diarrhea today.  She states that yesterday during a cookout she also had chest tightness which was transient.  She is currently chest pain-free.  She denies any shortness of breath and states that she has been compliant with her Coumadin.      Limitations to history: None  Independent Historians: Patient  External Records Reviewed: HIE, outpatient notes, inpatient notes  ------------------------------------------------------------------------------------------------------------------------------------------  ROS: a ten point review of systems was performed and was negative except as per HPI.  ------------------------------------------------------------------------------------------------------------------------------------------  PMH / PSH: as per HPI, otherwise reviewed in EMR  MEDS: as per HPI, otherwise reviewed in EMR  ALLERGIES: as per HPI, otherwise reviewed in EMR  SocH:  as per HPI, otherwise reviewed in EMR  FH:  as per HPI, otherwise reviewed in EMR  ------------------------------------------------------------------------------------------------------------------------------------------  Physical Exam:  VS: As documented in the triage note and EMR flowsheet from this visit was reviewed  General: Well appearing. No acute distress.   Eyes:  Extraocular movements grossly intact. No scleral icterus. No discharge  HEENT:  Normocephalic.  Atraumatic  Neck: Moves neck freely. No gross masses  CV: Regular rhythm. No murmurs, rubs or gallops   Resp: Clear to  auscultation bilaterally. No respiratory distress.    GI: Soft, no masses, nontender. No rebound tenderness or guarding  MSK: Symmetric muscle bulk. No deformities. No lower extremity edema.    Skin: Warm, dry, intact.   Neuro: No focal deficits.  A&O x3.   Psych: Appropriate for situation  ------------------------------------------------------------------------------------------------------------------------------------------  Hospital Course / Medical Decision Making:  Independent Interpretations: CT chest/abdomen/pelvis  EKG as interpreted by me: Paced rhythm at 66 bpm with no signs of acute ischemia    MDM: This is a 78-year-old female with a history of CAD status post CABG, mitral valve repair, tricuspid repair, HTN and HLD and diverticulitis presenting for her abdominal cramping and diarrhea.  Troponin nonelevated.  EKG is nonischemic.  CT shows a possible developing colonic wall abscess.  She was given broad-spectrum antibiotics.  I did make the surgical ISAIAH, [employer], aware of the patient's case.  The patient was admitted to the medicine service for further evaluation    Discussion of Management with Other Providers:   I discussed the patient/results with: Emergency medicine team    Final diagnosis and disposition as below.    Results for orders placed or performed during the hospital encounter of 04/17/24  -CBC and Auto Differential:        Result                      Value             Ref Range           WBC                         17.9 (H)          4.4 - 11.3 x*       nRBC                        0.0               0.0 - 0.0 /1*       RBC                         5.26 (H)          4.00 - 5.20 *       Hemoglobin                  14.1              12.0 - 16.0 *       Hematocrit                  44.3              36.0 - 46.0 %       MCV                         84                80 - 100 fL         MCH                         26.8              26.0 - 34.0 *       MCHC                        31.8 (L)          32.0 -  36.0 *       RDW                         14.7 (H)          11.5 - 14.5 %       Platelets                   227               150 - 450 x1*       Neutrophils %               63.9              40.0 - 80.0 %       Immature Granulocytes *     0.3               0.0 - 0.9 %         Lymphocytes %               28.3              13.0 - 44.0 %       Monocytes %                 6.4               2.0 - 10.0 %        Eosinophils %               0.7               0.0 - 6.0 %         Basophils %                 0.4               0.0 - 2.0 %         Neutrophils Absolute        11.42 (H)         1.60 - 5.50 *       Immature Granulocytes *     0.06              0.00 - 0.50 *       Lymphocytes Absolute        5.06 (H)          0.80 - 3.00 *       Monocytes Absolute          1.15 (H)          0.05 - 0.80 *       Eosinophils Absolute        0.13              0.00 - 0.40 *       Basophils Absolute          0.08              0.00 - 0.10 *  -Magnesium:        Result                      Value             Ref Range           Magnesium                   2.00              1.60 - 2.40 *  -Comprehensive metabolic panel:        Result                      Value             Ref Range           Glucose                     108 (H)           74 - 99 mg/dL       Sodium                      137               136 - 145 mm*       Potassium                   5.5 (H)           3.5 - 5.3 mm*       Chloride                    106               98 - 107 mmo*       Bicarbonate                 20 (L)            21 - 32 mmol*       Anion Gap                   17                10 - 20 mmol*       Urea Nitrogen               20                6 - 23 mg/dL        Creatinine                  0.97              0.50 - 1.05 *       eGFR                        61                >60 mL/min/1*       Calcium                     10.4 (H)          8.6 - 10.3 m*       Albumin                     4.6               3.4 - 5.0 g/*       Alkaline Phosphatase        75                 33 - 136 U/L        Total Protein               8.2               6.4 - 8.2 g/*       AST                         26                9 - 39 U/L          Bilirubin, Total            0.5               0.0 - 1.2 mg*       ALT                         9                 7 - 45 U/L     -Troponin I, High Sensitivity:        Result                      Value             Ref Range           Troponin I, High Sensi*     6                 0 - 13 ng/L    -Lipase:        Result                      Value             Ref Range           Lipase                      65                9 - 82 U/L     -Sars-CoV-2 and Influenza A/B PCR:        Result                      Value             Ref Range           Flu A Result                Not Detected      Not Detected        Flu B Result                Not Detected      Not Detected        Coronavirus 2019, PCR       Not Detected      Not Detected   CT chest abdomen pelvis w IV contrast   Final Result    CHEST:    1. No acute abnormalities within the chest.    2. Heavy aortic and coronary artery calcification.          ABDOMEN-PELVIS:    Significant wall thickening of a short segment of the sigmoid colon    with diverticulosis and question of wall abscess. Findings may be due    to recurrent severe acute diverticulitis. Given the degree of wall    thickening can not exclude neoplasm and clinical correlation and    follow-up is recommended.          MACRO:    None          Signed by: Sierra Kimble 4/17/2024 4:30 PM    Dictation workstation:   [id number]     XR chest 1 view   Final Result    1.  No evidence of acute cardiopulmonary process.                      MACRO:    None          Signed by: Sierra Kimble 4/17/2024 1:36 PM    Dictation workstation:   [id number]                                 [place] Coma Scale Score: 15                     Patient History   Past Medical History:   Diagnosis Date    Cough, unspecified 11/12/2019    Cough    Essential (primary) hypertension  2022    Hypertension    Personal history of colonic polyps     History of colonic polyps    Personal history of other diseases of the circulatory system 2020    History of coronary artery disease    Personal history of other diseases of the musculoskeletal system and connective tissue 2020    History of osteoarthritis    Pneumonia, unspecified organism 2015    Community acquired pneumonia     Past Surgical History:   Procedure Laterality Date    TUBAL LIGATION  06/15/2015    Tubal Ligation     Family History   Problem Relation Name Age of Onset    Diabetes Mother      Hypertension Father       Social History     Tobacco Use    Smoking status: Former     Current packs/day: 0.00     Types: Cigarettes     Quit date:      Years since quittin.3    Smokeless tobacco: Never   Substance Use Topics    Alcohol use: Not Currently    Drug use: Yes     Types: Marijuana       Physical Exam   ED Triage Vitals [24 1231]   Temperature Heart Rate Respirations BP   36.9 °C (98.4 °F) 72 16 172/65      Pulse Ox Temp src Heart Rate Source Patient Position   100 % -- -- --      BP Location FiO2 (%)     -- --       Physical Exam    ED Course & MDM   Diagnoses as of 24 222   Sepsis (Multi)   Intra-abdominal abscess (Multi)       Medical Decision Making      Procedure  Procedures     Mikel Iverson DO  24

## 2024-04-17 NOTE — PROGRESS NOTES
Pharmacy Medication History Review    Neha Tsang is a 78 y.o. female admitted for Sepsis (Multi). Pharmacy reviewed the patient's [id number] medications and allergies for accuracy.    The list below reflectives the updated PTA list. Please review each medication in order reconciliation for additional clarification and justification.  Prior to Admission medications    Medication Sig Start Date End Date Taking? Authorizing Provider                                                                                                                                        The list below reflectives the updated allergy list. Please review each documented allergy for additional clarification and justification.  Allergies  Reviewed by Bessy Kohli RN on 4/17/2024        Severity Reactions Comments    Diethyltoluamide Not Specified Unknown     Pneumococcal 13-valent Conjugate To Diphtheria Crm Not Specified Hives, Other Fever    Shellfish Containing Products Not Specified Swelling Facial swelling, throat walling    Shellfish Derived Not Specified Unknown     Sulfa (sulfonamide Antibiotics) Not Specified Hives             Below are additional concerns with the patient's PTA list.  Prior to Admission Medications   Prescriptions Last Dose Informant   acetaminophen (Tylenol) 325 mg tablet     Sig: Take by mouth every 4 hours if needed.   amLODIPine (Norvasc) 10 mg tablet     Sig: Take 1 tablet (10 mg) by mouth once daily.   aspirin 81 mg EC tablet     Sig: Take by mouth.   atorvastatin (Lipitor) 40 mg tablet     Sig: Take 1 tablet (40 mg) by mouth once daily.   calcium carbonate-vitamin D3 600 mg-20 mcg (800 unit) tablet     Sig: Take 1 tablet by mouth once daily.   cholecalciferol (Vitamin D-3) 125 MCG (5000 UT) capsule     Sig: Take by mouth.   docusate sodium (Colace) 100 mg capsule     Sig: Take 1 capsule (100 mg) by mouth 2 times a day as needed for constipation.   Patient not taking: Reported on  4/12/2024   ferrous sulfate 325 (65 Fe) MG tablet     Sig: Take 1 tablet by mouth.   gabapentin (Neurontin) 300 mg capsule     Sig: Take 1 capsule (300 mg) by mouth once daily at bedtime.   lisinopril 20 mg tablet     Sig: Take 1 tablet (20 mg) by mouth once daily.   magnesium oxide (Mag-Ox) 400 mg (241.3 mg magnesium) tablet     Sig: Take 1 tablet (400 mg) by mouth once daily.   metoprolol tartrate (Lopressor) 50 mg tablet     Sig: Take 1 tablet by mouth every 12 hours.   omeprazole (PriLOSEC) 40 mg DR capsule     Sig: TAKE 1 CAPSULE(40 MG) BY MOUTH DAILY. DO NOT CRUSH OR CHEW   Patient not taking: Reported on 4/12/2024   sodium chloride (Ocean) 0.65 % nasal spray     Sig: Administer 1 spray into affected nostril(s) if needed.   spironolactone (Aldactone) 25 mg tablet     Sig: Take 1 tablet (25 mg) by mouth once daily.   warfarin (Coumadin) 3 mg tablet     Sig: Take 1 tablet (3 mg) by mouth once daily in the evening. Take as directed      Facility-Administered Medications: None    USED INFO FROM 4/12/24 office visit - cardiology - ROBBIN Garcia  No changes in medication list    Roxanna Vivar     No

## 2024-04-17 NOTE — ED TRIAGE NOTES
"Pt to ed with c/o diarrhea x 3 weeks and chest tightness starting yesterday. Pt states she has increased weakness and \"just does not feel good\". Hx open heart surgery. + thinners  "

## 2024-04-18 LAB
ANION GAP SERPL CALC-SCNC: 15 MMOL/L (ref 10–20)
ATRIAL RATE: 66 BPM
BACTERIA UR CULT: NORMAL
BUN SERPL-MCNC: 14 MG/DL (ref 6–23)
CALCIUM SERPL-MCNC: 9.5 MG/DL (ref 8.6–10.3)
CHLORIDE SERPL-SCNC: 107 MMOL/L (ref 98–107)
CO2 SERPL-SCNC: 19 MMOL/L (ref 21–32)
CREAT SERPL-MCNC: 0.9 MG/DL (ref 0.5–1.05)
EGFRCR SERPLBLD CKD-EPI 2021: 66 ML/MIN/1.73M*2
ERYTHROCYTE [DISTWIDTH] IN BLOOD BY AUTOMATED COUNT: 14.6 % (ref 11.5–14.5)
GLUCOSE SERPL-MCNC: 82 MG/DL (ref 74–99)
HCT VFR BLD AUTO: 38.1 % (ref 36–46)
HGB BLD-MCNC: 12.4 G/DL (ref 12–16)
INR PPP: 2 (ref 0.9–1.1)
MCH RBC QN AUTO: 26.4 PG (ref 26–34)
MCHC RBC AUTO-ENTMCNC: 32.5 G/DL (ref 32–36)
MCV RBC AUTO: 81 FL (ref 80–100)
NRBC BLD-RTO: 0 /100 WBCS (ref 0–0)
P AXIS: 90 DEGREES
P OFFSET: 195 MS
P ONSET: 161 MS
PLATELET # BLD AUTO: 216 X10*3/UL (ref 150–450)
POTASSIUM SERPL-SCNC: 3.9 MMOL/L (ref 3.5–5.3)
PR INTERVAL: 136 MS
PROTHROMBIN TIME: 22.2 SECONDS (ref 9.8–12.8)
Q ONSET: 212 MS
QRS COUNT: 10 BEATS
QRS DURATION: 88 MS
QT INTERVAL: 398 MS
QTC CALCULATION(BAZETT): 417 MS
QTC FREDERICIA: 411 MS
R AXIS: 45 DEGREES
RBC # BLD AUTO: 4.7 X10*6/UL (ref 4–5.2)
SODIUM SERPL-SCNC: 137 MMOL/L (ref 136–145)
T AXIS: 62 DEGREES
T OFFSET: 411 MS
VENTRICULAR RATE: 66 BPM
WBC # BLD AUTO: 13.3 X10*3/UL (ref 4.4–11.3)

## 2024-04-18 PROCEDURE — 99222 1ST HOSP IP/OBS MODERATE 55: CPT | Performed by: INTERNAL MEDICINE

## 2024-04-18 PROCEDURE — 1200000002 HC GENERAL ROOM WITH TELEMETRY DAILY

## 2024-04-18 PROCEDURE — 85027 COMPLETE CBC AUTOMATED: CPT

## 2024-04-18 PROCEDURE — 99223 1ST HOSP IP/OBS HIGH 75: CPT | Performed by: SURGERY

## 2024-04-18 PROCEDURE — 99221 1ST HOSP IP/OBS SF/LOW 40: CPT

## 2024-04-18 PROCEDURE — 85610 PROTHROMBIN TIME: CPT

## 2024-04-18 PROCEDURE — 80048 BASIC METABOLIC PNL TOTAL CA: CPT

## 2024-04-18 PROCEDURE — 36415 COLL VENOUS BLD VENIPUNCTURE: CPT

## 2024-04-18 PROCEDURE — 2500000004 HC RX 250 GENERAL PHARMACY W/ HCPCS (ALT 636 FOR OP/ED): Performed by: INTERNAL MEDICINE

## 2024-04-18 PROCEDURE — 2500000001 HC RX 250 WO HCPCS SELF ADMINISTERED DRUGS (ALT 637 FOR MEDICARE OP): Performed by: INTERNAL MEDICINE

## 2024-04-18 PROCEDURE — 2500000001 HC RX 250 WO HCPCS SELF ADMINISTERED DRUGS (ALT 637 FOR MEDICARE OP)

## 2024-04-18 PROCEDURE — 2500000006 HC RX 250 W HCPCS SELF ADMINISTERED DRUGS (ALT 637 FOR ALL PAYERS): Mod: MUE

## 2024-04-18 RX ORDER — AMLODIPINE BESYLATE 10 MG/1
10 TABLET ORAL DAILY
Status: DISCONTINUED | OUTPATIENT
Start: 2024-04-18 | End: 2024-04-19 | Stop reason: HOSPADM

## 2024-04-18 RX ORDER — GABAPENTIN 300 MG/1
300 CAPSULE ORAL NIGHTLY
Status: DISCONTINUED | OUTPATIENT
Start: 2024-04-18 | End: 2024-04-19 | Stop reason: HOSPADM

## 2024-04-18 RX ORDER — ASPIRIN 81 MG/1
81 TABLET ORAL DAILY
Status: DISCONTINUED | OUTPATIENT
Start: 2024-04-18 | End: 2024-04-19 | Stop reason: HOSPADM

## 2024-04-18 RX ORDER — ATORVASTATIN CALCIUM 40 MG/1
40 TABLET, FILM COATED ORAL NIGHTLY
Status: DISCONTINUED | OUTPATIENT
Start: 2024-04-18 | End: 2024-04-19 | Stop reason: HOSPADM

## 2024-04-18 RX ORDER — METOPROLOL TARTRATE 50 MG/1
50 TABLET ORAL EVERY 12 HOURS
Status: DISCONTINUED | OUTPATIENT
Start: 2024-04-18 | End: 2024-04-19 | Stop reason: HOSPADM

## 2024-04-18 RX ORDER — WARFARIN 3 MG/1
3 TABLET ORAL DAILY
Status: DISCONTINUED | OUTPATIENT
Start: 2024-04-18 | End: 2024-04-19 | Stop reason: HOSPADM

## 2024-04-18 RX ORDER — SPIRONOLACTONE 25 MG/1
25 TABLET ORAL DAILY
Status: DISCONTINUED | OUTPATIENT
Start: 2024-04-18 | End: 2024-04-19 | Stop reason: HOSPADM

## 2024-04-18 RX ORDER — LISINOPRIL 20 MG/1
20 TABLET ORAL DAILY
Status: DISCONTINUED | OUTPATIENT
Start: 2024-04-18 | End: 2024-04-19 | Stop reason: HOSPADM

## 2024-04-18 RX ADMIN — PIPERACILLIN SODIUM AND TAZOBACTAM SODIUM 3.38 G: 3; .375 INJECTION, SOLUTION INTRAVENOUS at 23:40

## 2024-04-18 RX ADMIN — PIPERACILLIN SODIUM AND TAZOBACTAM SODIUM 3.38 G: 3; .375 INJECTION, SOLUTION INTRAVENOUS at 05:00

## 2024-04-18 RX ADMIN — PIPERACILLIN SODIUM AND TAZOBACTAM SODIUM 3.38 G: 3; .375 INJECTION, SOLUTION INTRAVENOUS at 17:28

## 2024-04-18 RX ADMIN — ACETAMINOPHEN 650 MG: 325 TABLET ORAL at 08:40

## 2024-04-18 RX ADMIN — LISINOPRIL 20 MG: 20 TABLET ORAL at 08:40

## 2024-04-18 RX ADMIN — AMLODIPINE BESYLATE 10 MG: 10 TABLET ORAL at 08:40

## 2024-04-18 RX ADMIN — PIPERACILLIN SODIUM AND TAZOBACTAM SODIUM 3.38 G: 3; .375 INJECTION, SOLUTION INTRAVENOUS at 12:29

## 2024-04-18 RX ADMIN — METOPROLOL TARTRATE 50 MG: 50 TABLET, FILM COATED ORAL at 20:08

## 2024-04-18 RX ADMIN — METOPROLOL TARTRATE 50 MG: 50 TABLET, FILM COATED ORAL at 08:40

## 2024-04-18 RX ADMIN — ATORVASTATIN CALCIUM 40 MG: 40 TABLET, FILM COATED ORAL at 20:08

## 2024-04-18 RX ADMIN — GABAPENTIN 300 MG: 300 CAPSULE ORAL at 20:08

## 2024-04-18 RX ADMIN — Medication 3 MG: at 20:08

## 2024-04-18 RX ADMIN — ASPIRIN 81 MG: 81 TABLET, COATED ORAL at 08:41

## 2024-04-18 RX ADMIN — WARFARIN SODIUM 3 MG: 3 TABLET ORAL at 17:28

## 2024-04-18 RX ADMIN — PANTOPRAZOLE SODIUM 40 MG: 40 TABLET, DELAYED RELEASE ORAL at 07:00

## 2024-04-18 ASSESSMENT — COGNITIVE AND FUNCTIONAL STATUS - GENERAL
MOBILITY SCORE: 24
DAILY ACTIVITIY SCORE: 24
DAILY ACTIVITIY SCORE: 24
MOBILITY SCORE: 24

## 2024-04-18 ASSESSMENT — PAIN - FUNCTIONAL ASSESSMENT
PAIN_FUNCTIONAL_ASSESSMENT: 0-10
PAIN_FUNCTIONAL_ASSESSMENT: 0-10

## 2024-04-18 ASSESSMENT — PAIN SCALES - GENERAL
PAINLEVEL_OUTOF10: 0 - NO PAIN

## 2024-04-18 ASSESSMENT — ENCOUNTER SYMPTOMS: DIARRHEA: 1

## 2024-04-18 ASSESSMENT — ACTIVITIES OF DAILY LIVING (ADL): LACK_OF_TRANSPORTATION: NO

## 2024-04-18 NOTE — CONSULTS
Reason For Consult  Diverticulitis    Assessment/Plan   Patient with recurrent sigmoid diverticulitis presumably.  Should respond to broad-spectrum IV antibiotics.  Does not seem like there is much for IR to put a drain in at this time.  Strongly advise follow-up colonoscopy in 6 to 8 weeks.  Given the number of recurrences she may be a candidate for elective sigmoid resection down the road      History Of Present Illness  Neha Tsang is a 78 y.o. female presenting with Several days of lower abdominal pain and copious diarrhea.  She has been having looser stools for about 3 weeks.  Pain is similar to prior episodes of diverticulitis she has had in the past.  In the ER she was found to have evidence of diverticulitis with.  Sigmoid phlegmon.  Last colonoscopy was at least 4 years ago.  She is on Coumadin.     Past Medical History  She has a past medical history of Cough, unspecified (11/12/2019), Essential (primary) hypertension (11/18/2022), Personal history of colonic polyps, Personal history of other diseases of the circulatory system (08/25/2020), Personal history of other diseases of the musculoskeletal system and connective tissue (08/25/2020), and Pneumonia, unspecified organism (06/01/2015).    Surgical History  She has a past surgical history that includes Tubal ligation (06/15/2015).     Social History  She reports that she quit smoking about 41 years ago. Her smoking use included cigarettes. She has never used smokeless tobacco. She reports that she does not currently use alcohol. She reports current drug use. Drug: Marijuana.    Family History  Family History   Problem Relation Name Age of Onset    Diabetes Mother      Hypertension Father          Allergies  Diethyltoluamide, Pneumococcal 13-valent conjugate to diphtheria crm, Shellfish containing products, Shellfish derived, and Sulfa (sulfonamide antibiotics)    Review of Systems  Constitutional: no weight loss, no fevers, no  "malaise  HEENT: negative  Neck: negative  Pulmonary: no SOB, no cough  CV: no chest pain, otherwise negative  GI: See HPI  : no hematuria, retention.  MS: no aches/pains  Neurologic: negative  Skin: no rashes, lesions  HEME: On Coumadin  Psych: no mood issues    Physical Exam  General: well appearing, no acute distress, well nourished  HEENT: normal  Neck: supple  Pulmonary: lungs clear to auscultation bilaterally  CV: RR, S1S2, no murmurs.  Pulses palpable and equal.  Good capillary refill  Abdomen: soft, n mild tenderness left lower quadrant and suprapubic area.  No diffuse peritoneal signs.  : grossly normal external genitalia  MS: grossly normal  Neurologic: alert and oriented, strength/sensation intact  Skin: non jaundiced, no lesions  Psych: mood appropriate    Last Recorded Vitals  Blood pressure 124/58, pulse 61, temperature 37 °C (98.6 °F), temperature source Temporal, resp. rate 18, height 1.651 m (5' 5\"), weight 66.3 kg (146 lb 3.2 oz), SpO2 100%.    Relevant Results  Lab Results   Component Value Date    WBC 13.3 (H) 04/18/2024    HGB 12.4 04/18/2024    HCT 38.1 04/18/2024    MCV 81 04/18/2024     04/18/2024         Sodium   Date Value Ref Range Status   04/18/2024 137 136 - 145 mmol/L Final     Lactate   Date Value Ref Range Status   10/05/2022 1.0 0.4 - 2.0 mmol/L Final     Comment:      Venipuncture immediately after or during the    administration of Metamizole may lead to falsely   low results. Testing should be performed immediately   prior to Metamizole dosing.       Chloride   Date Value Ref Range Status   04/18/2024 107 98 - 107 mmol/L Final     Urea Nitrogen   Date Value Ref Range Status   04/18/2024 14 6 - 23 mg/dL Final         Lab Results   Component Value Date    K 3.9 04/18/2024    CALCIUM 9.5 04/18/2024      No results found for this or any previous visit from the past 3 days.          I spent 40 minutes in the professional and overall care of this patient.        "

## 2024-04-18 NOTE — CONSULTS
Reason For Consult  Diverticulosis with abscess    History Of Present Illness  Neha Tsang is a 78 y.o. female presenting with diarrhea. She states she had diarrhea for about 3 weeks, but states it got better. The diarrhea started again today with associated LLQ tenderness. She states she did notice bright red blood, but states that is due to her known hemorrhoids. She is feeling weak from having diarrhea. She endorses nausea without vomiting. Of note, she was last hospitalized in 8/2023 with acute sigmoid diverticulitis, which was treated conservatively with IV fluids and antibiotics. She does take Coumadin - her last dose was Monday. She is afebrile and is not tachycardic. WBC 17.9, H/H 14.1/44.3, PT/INR 23.9/2.1. CT C/A/P demonstrated significant wall thickening of a short segment of the sigmoid colon with diverticulosis and question of wall abscess. Findings may be due to recurrent severe acute diverticulitis. Given th degree of wall thickening, cannot exclude neoplasm. Due to these findings, general surgery was consulted.      Past Medical History  She has a past medical history of Cough, unspecified (11/12/2019), Essential (primary) hypertension (11/18/2022), Personal history of colonic polyps, Personal history of other diseases of the circulatory system (08/25/2020), Personal history of other diseases of the musculoskeletal system and connective tissue (08/25/2020), and Pneumonia, unspecified organism (06/01/2015).    Surgical History  She has a past surgical history that includes Tubal ligation (06/15/2015).     Social History  She reports that she quit smoking about 41 years ago. Her smoking use included cigarettes. She has never used smokeless tobacco. She reports that she does not currently use alcohol. She reports current drug use. Drug: Marijuana.    Family History  Family History   Problem Relation Name Age of Onset    Diabetes Mother      Hypertension Father         "  Allergies  Diethyltoluamide, Pneumococcal 13-valent conjugate to diphtheria crm, Shellfish containing products, Shellfish derived, and Sulfa (sulfonamide antibiotics)    Review of Systems  ABD: + pain, diarrhea, nausea. Denies vomiting, constipation.     Physical Exam  Constitutional: Awake/alert/oriented x3, no distress, cooperative.  Skin: Warm and dry.  Eyes: EOMI, clear sclera.  ENMT: Mucus membranes moist, no apparent injury.  Head/Neck: Neck supple, no apparent injury.  Respiratory: Unlabored breathing on room air.  Cardiovascular: RRR.  GI: Non distended, soft, LLQ mildly tender to palpation.  Musculoskeletal: ROM intact, normal strength.  Extremities: DEL ANGEL.  Neurological: Alert and oriented x3, no focal deficits.  Psychological: Appropriate mood and behavior.      Last Recorded Vitals  Blood pressure 168/72, pulse 64, temperature 36.9 °C (98.4 °F), temperature source Temporal, resp. rate 17, height 1.651 m (5' 5\"), weight 66.3 kg (146 lb 3.2 oz), SpO2 100%.    Relevant Results  Results for orders placed or performed during the hospital encounter of 04/17/24 (from the past 24 hour(s))   CBC and Auto Differential   Result Value Ref Range    WBC 17.9 (H) 4.4 - 11.3 x10*3/uL    nRBC 0.0 0.0 - 0.0 /100 WBCs    RBC 5.26 (H) 4.00 - 5.20 x10*6/uL    Hemoglobin 14.1 12.0 - 16.0 g/dL    Hematocrit 44.3 36.0 - 46.0 %    MCV 84 80 - 100 fL    MCH 26.8 26.0 - 34.0 pg    MCHC 31.8 (L) 32.0 - 36.0 g/dL    RDW 14.7 (H) 11.5 - 14.5 %    Platelets 227 150 - 450 x10*3/uL    Neutrophils % 63.9 40.0 - 80.0 %    Immature Granulocytes %, Automated 0.3 0.0 - 0.9 %    Lymphocytes % 28.3 13.0 - 44.0 %    Monocytes % 6.4 2.0 - 10.0 %    Eosinophils % 0.7 0.0 - 6.0 %    Basophils % 0.4 0.0 - 2.0 %    Neutrophils Absolute 11.42 (H) 1.60 - 5.50 x10*3/uL    Immature Granulocytes Absolute, Automated 0.06 0.00 - 0.50 x10*3/uL    Lymphocytes Absolute 5.06 (H) 0.80 - 3.00 x10*3/uL    Monocytes Absolute 1.15 (H) 0.05 - 0.80 x10*3/uL    " Eosinophils Absolute 0.13 0.00 - 0.40 x10*3/uL    Basophils Absolute 0.08 0.00 - 0.10 x10*3/uL   Magnesium   Result Value Ref Range    Magnesium 2.00 1.60 - 2.40 mg/dL   Comprehensive metabolic panel   Result Value Ref Range    Glucose 108 (H) 74 - 99 mg/dL    Sodium 137 136 - 145 mmol/L    Potassium 5.5 (H) 3.5 - 5.3 mmol/L    Chloride 106 98 - 107 mmol/L    Bicarbonate 20 (L) 21 - 32 mmol/L    Anion Gap 17 10 - 20 mmol/L    Urea Nitrogen 20 6 - 23 mg/dL    Creatinine 0.97 0.50 - 1.05 mg/dL    eGFR 61 >60 mL/min/1.73m*2    Calcium 10.4 (H) 8.6 - 10.3 mg/dL    Albumin 4.6 3.4 - 5.0 g/dL    Alkaline Phosphatase 75 33 - 136 U/L    Total Protein 8.2 6.4 - 8.2 g/dL    AST 26 9 - 39 U/L    Bilirubin, Total 0.5 0.0 - 1.2 mg/dL    ALT 9 7 - 45 U/L   Troponin I, High Sensitivity   Result Value Ref Range    Troponin I, High Sensitivity 6 0 - 13 ng/L   Lipase   Result Value Ref Range    Lipase 65 9 - 82 U/L   Sars-CoV-2 and Influenza A/B PCR   Result Value Ref Range    Flu A Result Not Detected Not Detected    Flu B Result Not Detected Not Detected    Coronavirus 2019, PCR Not Detected Not Detected   Protime-INR   Result Value Ref Range    Protime 23.9 (H) 9.8 - 12.8 seconds    INR 2.1 (H) 0.9 - 1.1   Urinalysis with Reflex Culture and Microscopic   Result Value Ref Range    Color, Urine Colorless (N) Light-Yellow, Yellow, Dark-Yellow    Appearance, Urine Clear Clear    Specific Gravity, Urine >1.050 (N) 1.005 - 1.035    pH, Urine 6.0 5.0, 5.5, 6.0, 6.5, 7.0, 7.5, 8.0    Protein, Urine 10 (TRACE) NEGATIVE, 10 (TRACE), 20 (TRACE) mg/dL    Glucose, Urine Normal Normal mg/dL    Blood, Urine 0.1 (1+) (A) NEGATIVE    Ketones, Urine 20 (1+) (A) NEGATIVE mg/dL    Bilirubin, Urine NEGATIVE NEGATIVE    Urobilinogen, Urine Normal Normal mg/dL    Nitrite, Urine NEGATIVE NEGATIVE    Leukocyte Esterase, Urine 75 Jonatan/µL (A) NEGATIVE   Microscopic Only, Urine   Result Value Ref Range    WBC, Urine 1-5 1-5, NONE /HPF    RBC, Urine 11-20 (A)  NONE, 1-2, 3-5 /HPF    Squamous Epithelial Cells, Urine 1-9 (SPARSE) Reference range not established. /HPF       CT chest abdomen pelvis w IV contrast    Result Date: 4/17/2024  Interpreted By:  Sierra Kimble, STUDY: CT CHEST ABDOMEN PELVIS W IV CONTRAST;  4/17/2024 3:47 pm   INDICATION: Signs/Symptoms:Chest tightness, profuse diarrhea and abdominal cramping.   COMPARISON:   08/21/2023   ACCESSION NUMBER(S): [id number]   ORDERING CLINICIAN: CARLY LOWRY   TECHNIQUE: CT of the chest, abdomen, and pelvis was performed.  Contiguous axial images were obtained at 3 mm slice thickness through the chest, abdomen and pelvis. Coronal and sagittal reconstructions at 3 mm slice thickness were performed. 75 cc Omnipaque 350 administered intravenously without immediate complication.   FINDINGS: CHEST:   LUNG/PLEURA/LARGE AIRWAYS: No suspicious lung nodules or infiltrates. Likely bibasilar atelectasis and/or scarring.   VESSELS: Aortic calcification. Normal caliber aorta. HEART: No cardiomegaly. No pericardial effusion. Dense coronary artery calcification.   MEDIASTINUM AND MARIE: No significant mediastinal or hilar adenopathy.   CHEST WALL AND LOWER NECK: Grossly unremarkable thyroid gland. Sternal wires.   ABDOMEN:   LIVER: The liver is grossly unremarkable except for likely focal area of fatty infiltration adjacent to the falciform ligament.   BILE DUCTS: No bile duct dilatation.   GALLBLADDER: No definite gallstones.   PANCREAS: Unremarkable.   SPLEEN: Unremarkable spleen.   ADRENAL GLANDS: Left adrenal nodule similar the prior exam.   KIDNEYS AND URETERS: Cortical thinning of both kidneys. Likely simple renal cysts bilaterally. No hydronephrosis.   PELVIS:   BLADDER: The bladder is incompletely distended and evaluated.   REPRODUCTIVE ORGANS: No definite masses.   BOWEL: There is again significant sigmoid wall thickening with surrounding fat stranding and inflammatory changes. There is also a fluid collection with air-fluid  level likely arising from the sigmoid wall measuring approximately 2 cm. Diverticulosis.     VESSELS: Heavy aortic calcification.. No aneurysmal dilatation.   PERITONEUM/RETROPERITONEUM/LYMPH NODES: No retroperitoneal adenopathy. No ascites.   BONE AND SOFT TISSUE: Scoliosis and discogenic degenerative changes.       CHEST: 1. No acute abnormalities within the chest. 2. Heavy aortic and coronary artery calcification.   ABDOMEN-PELVIS: Significant wall thickening of a short segment of the sigmoid colon with diverticulosis and question of wall abscess. Findings may be due to recurrent severe acute diverticulitis. Given the degree of wall thickening can not exclude neoplasm and clinical correlation and follow-up is recommended.   MACRO: None   Signed by: Sierra Kimble 4/17/2024 4:30 PM Dictation workstation:   [id number]    XR chest 1 view    Result Date: 4/17/2024  Interpreted By:  Sierra Kimble, STUDY: XR CHEST 1 VIEW;  4/17/2024 1:34 pm   INDICATION: Signs/Symptoms:CP.   COMPARISON: 01/03/2023   ACCESSION NUMBER(S): [id number]   ORDERING CLINICIAN: CARLY LOWRY   FINDINGS:         CARDIOMEDIASTINAL SILHOUETTE: Cardiomediastinal silhouette is normal in size and configuration. Cardiac pacer. Sternal wires. Aortic calcification.   LUNGS: Lungs are clear.   ABDOMEN: No remarkable upper abdominal findings.   BONES: No acute osseous changes.       1.  No evidence of acute cardiopulmonary process.       MACRO: None   Signed by: Sierra Kimble 4/17/2024 1:36 PM Dictation workstation:   [id number]    ECG 12 Lead    Result Date: 4/15/2024  Electronic atrial pacemaker Low voltage QRS Borderline ECG Confirmed by Carlos Valencia ([id number]) on 4/15/2024 12:32:12 PM       Assessment/Plan  Diverticulosis, (?) wall abscess  - Abdomen non distended, soft, LLQ mildly tender to palpation. Endorses nausea without vomiting.  - Pain control  - PRN antiemetic  - IV antibiotics  - Could consider IR consultation to evaluate for  aspiration/drain  - Trend WBC  - No acute general surgery interventions at this time - recommending conservative treatment at this time    Dispo: Could consider IR consultation to evaluate for aspiration/drain. Antibiotics. Conservative treatment from a general surgical standpoint at this time.    I spent 35 minutes in the professional and overall care of this patient.      Romana Deutsch, APRN-CNP

## 2024-04-18 NOTE — H&P
Neha Tsang is a 78 y.o. female   Weakness, Gen  Associated symptoms include chest pain.   Chest Pain     Diarrhea        Patient with a past medical history of hypertension dyslipidemia coronary artery disease s/p bypass history of complete heart block history of atrial flutter currently on Coumadin and history of diverticulitis in the past was doing well until a few days ago when she started having some abdominal pain and discomfort with diarrhea  This was preceded by diarrhea for the last 3 weeks  Denies being on any antibiotics  Denies any melena or hematochezia nausea vomiting fevers or chills  Last colonoscopy was last year  Did show a polyp but the colonoscopy was a poor prep  Denies any weight loss    Past Medical History  Past Medical History:   Diagnosis Date    Cough, unspecified 11/12/2019    Cough    Essential (primary) hypertension 11/18/2022    Hypertension    Personal history of colonic polyps     History of colonic polyps    Personal history of other diseases of the circulatory system 08/25/2020    History of coronary artery disease    Personal history of other diseases of the musculoskeletal system and connective tissue 08/25/2020    History of osteoarthritis    Pneumonia, unspecified organism 06/01/2015    Community acquired pneumonia       Surgical History  Past Surgical History:   Procedure Laterality Date    TUBAL LIGATION  06/15/2015    Tubal Ligation        Social History  She reports that she quit smoking about 41 years ago. Her smoking use included cigarettes. She has never used smokeless tobacco. She reports that she does not currently use alcohol. She reports current drug use. Drug: Marijuana.    Family History  Family History   Problem Relation Name Age of Onset    Diabetes Mother      Hypertension Father          Allergies  Diethyltoluamide, Pneumococcal 13-valent conjugate to diphtheria crm, Shellfish containing products, Shellfish derived, and Sulfa (sulfonamide  antibiotics)    Review of Systems   Cardiovascular:  Positive for chest pain.   Gastrointestinal:  Positive for diarrhea.        Constitutional: not feeling poorly, no fever, no recent weight gain and no recent weight loss.   Eyes: no blurred vision and no diplopia.   ENT: no hearing loss, no tinnitus, no earache, no sore throat, no hoarseness and no swollen glands in the neck.   Cardiovascular: no chest pain, no tightness or heavy pressure, no shortness of breath, no palpitations and no lower extremity edema.   Respiratory: no cough, wheezing or shortness of breath at rest or exertion  Gastrointestinal: Diarrhea  Genitourinary: no urinary frequency, no dysuria, no hematuria, no burning sensation during urination, urinary stream is not smaller and urinary stream does not start and stop.   Musculoskeletal: no arthralgias, no joint stiffness, no muscle weakness, no back pain and no difficulty walking.   Skin: no rashes, no change in skin color and pigmentation, no skin lesions and no skin lumps.   Neurological: no headaches, no dizziness, no seizures, no tingling, no numbness, no signs and symptoms of stroke and no limb weakness.   Psychiatric: no confusion, no memory lapses or loss, no depression and no sleep disturbances.   Endocrine:  no excessive thirst, no dry skin, no cold intolerance, no heat intolerance and no increased urinary frequency.   Hematologic/Lymphatic: is not slow to heal, does not bleed easily, does not bruise easily, no thrombophlebitis, no anemia and no history of blood transfusion.   All other systems have been reviewed and are negative for complaint.     Vitals:    04/18/24 1246   BP: 150/66   Pulse: 61   Resp: 18   Temp: 36.9 °C (98.4 °F)   SpO2: 100%        Scheduled medications  amLODIPine, 10 mg, oral, Daily  aspirin, 81 mg, oral, Daily  atorvastatin, 40 mg, oral, Nightly  gabapentin, 300 mg, oral, Nightly  lisinopril, 20 mg, oral, Daily  metoprolol tartrate, 50 mg, oral,  q12h  pantoprazole, 40 mg, oral, Daily before breakfast  piperacillin-tazobactam, 3.375 g, intravenous, q6h  [Held by provider] spironolactone, 25 mg, oral, Daily  warfarin, 3 mg, oral, Daily      Continuous medications     PRN medications  PRN medications: acetaminophen, melatonin, morphine, ondansetron, sodium chloride, traMADol    Results from last 7 days   Lab Units 04/18/24  0728 04/17/24  1301   WBC AUTO x10*3/uL 13.3* 17.9*   HEMOGLOBIN g/dL 12.4 14.1   HEMATOCRIT % 38.1 44.3   PLATELETS AUTO x10*3/uL 216 227     Results from last 7 days   Lab Units 04/18/24  0728 04/17/24  1301   SODIUM mmol/L 137 137   POTASSIUM mmol/L 3.9 5.5*   CHLORIDE mmol/L 107 106   CO2 mmol/L 19* 20*   BUN mg/dL 14 20   CREATININE mg/dL 0.90 0.97   CALCIUM mg/dL 9.5 10.4*   PROTEIN TOTAL g/dL  --  8.2   BILIRUBIN TOTAL mg/dL  --  0.5   ALK PHOS U/L  --  75   ALT U/L  --  9   AST U/L  --  26   GLUCOSE mg/dL 82 108*     Results from last 7 days   Lab Units 04/17/24  1301   TROPHS ng/L 6        CT chest abdomen pelvis w IV contrast   Final Result   CHEST:   1. No acute abnormalities within the chest.   2. Heavy aortic and coronary artery calcification.        ABDOMEN-PELVIS:   Significant wall thickening of a short segment of the sigmoid colon   with diverticulosis and question of wall abscess. Findings may be due   to recurrent severe acute diverticulitis. Given the degree of wall   thickening can not exclude neoplasm and clinical correlation and   follow-up is recommended.        MACRO:   None        Signed by: Sierra Kimble 4/17/2024 4:30 PM   Dictation workstation:   [id number]      XR chest 1 view   Final Result   1.  No evidence of acute cardiopulmonary process.                  MACRO:   None        Signed by: Sierra Kimble 4/17/2024 1:36 PM   Dictation workstation:   [id number]      Consult to Interventional Radiology    (Results Pending)       Physical Exam     Constitutional   General appearance: Alert and in no acute  distress.   Eyes   Inspection of eyes: Sclera and conjunctiva were normal.    Pupil exam: Pupils were equal in size. Extraocular movements were intact.   Pulmonary   Respiratory assessment: No respiratory distress, normal respiratory rhythm and effort.    Auscultation of Lungs: Clear bilateral breath sounds.   Cardiovascular   Auscultation of heart: Apical pulse normal, heart rate and rhythm normal, normal S1 and S2, no murmurs and no pericardial rub.    Exam for edema: No peripheral edema.   Abdomen   Abdominal Exam: Mild left lower quadrant tenderness  Liver and Spleen exam: No hepato-splenomegaly.   Musculoskeletal   Examination of gait: Normal.    Inspection of digits and nails: No clubbing or cyanosis of the fingernails.    Inspection/palpation of joints, bones and muscles: No joint swelling. Normal movement of all extremities.   Skin   Skin inspection: Normal skin color and pigmentation, normal skin turgor and no visible rash.   Neurologic   Cranial nerves: Nerves 2-12 were intact, no focal neuro defects.   Psychiatric   Orientation: Oriented to person, place, and time.    Mood and affect: Normal.       Assessment/Plan      #Acute diverticulitis with abscess  Continue Zosyn  Will need a colonoscopy in 3 months as the previous colonoscopy was a poor prep    #Coronary artery disease  Stable continue aspirin and statins    #Hypertension  Stable continue home medications    #Dyslipidemia  Stable continue statins with a target LDL less than 70    #Atrial flutter  Rate controlled on Coumadin  Continue meds

## 2024-04-18 NOTE — PROGRESS NOTES
04/18/24 1604   Discharge Planning   Living Arrangements Alone   Support Systems Children   Assistance Needed PTA patient was Independent w/ADL's/ IADL's no use asst device   Type of Residence Private residence  (demo correct)   Number of Stairs to Enter Residence 5   Number of Stairs Within Residence 0   Home or Post Acute Services None   Patient expects to be discharged to: home-patient anticipates home no futther needs or resources   Does the patient need discharge transport arranged? No   Financial Resource Strain   How hard is it for you to pay for the very basics like food, housing, medical care, and heating? Not very   Housing Stability   In the last 12 months, was there a time when you were not able to pay the mortgage or rent on time? N   In the last 12 months, how many places have you lived? 1   In the last 12 months, was there a time when you did not have a steady place to sleep or slept in a shelter (including now)? N   Transportation Needs   In the past 12 months, has lack of transportation kept you from medical appointments or from getting medications? no  (PCP listed last seen Aug/Sept 2023, drives or uses a Urber)   In the past 12 months, has lack of transportation kept you from meetings, work, or from getting things needed for daily living? No     Care Coordinator Note:  Met patient at bedside to discuss discharge planning, explain my role as care coordinator  Plan: #Acute diverticulitis with abscess  Continue Simeon SINGHN, RN TCC

## 2024-04-18 NOTE — CONSULTS
Consults    Reason For Consult  Sigmoid colon abscess    History Of Present Illness  Neha Tsang is a 78 y.o. female presenting with a past medical history of Aflutter on warfarin, complete heart block, anxiety, HTN, HLD, CABG, MVR, CAD, and diverticulitis. Presented with diverticulitis and wall abscess of the sigmoid colon on CT A/P. Was admitted and started on Abx. Patient does report she has had ongoing diarrhea X3 weeks, flushing, intermittent nausea and abdominal pain. She denies fever, chills, vomiting, dysuria, frequency or urgency. IR consulted for abscess drain placement.      Past Medical History  She has a past medical history of Cough, unspecified (11/12/2019), Essential (primary) hypertension (11/18/2022), Personal history of colonic polyps, Personal history of other diseases of the circulatory system (08/25/2020), Personal history of other diseases of the musculoskeletal system and connective tissue (08/25/2020), and Pneumonia, unspecified organism (06/01/2015).    Surgical History  She has a past surgical history that includes Tubal ligation (06/15/2015).     Social History  She reports that she quit smoking about 41 years ago. Her smoking use included cigarettes. She has never used smokeless tobacco. She reports that she does not currently use alcohol. She reports current drug use. Drug: Marijuana.    Family History  Family History   Problem Relation Name Age of Onset    Diabetes Mother      Hypertension Father          Allergies  Diethyltoluamide, Pneumococcal 13-valent conjugate to diphtheria crm, Shellfish containing products, Shellfish derived, and Sulfa (sulfonamide antibiotics)    MEDS:    Current Facility-Administered Medications:     acetaminophen (Tylenol) tablet 650 mg, 650 mg, oral, q6h PRN, Shaista Bonilla MD, 650 mg at 04/18/24 0840    amLODIPine (Norvasc) tablet 10 mg, 10 mg, oral, Daily, Radha Rodriguez PA-C, 10 mg at 04/18/24 0840    aspirin EC tablet 81 mg, 81 mg, oral,  Daily, Radha Rodriguez PA-C, 81 mg at 04/18/24 0841    atorvastatin (Lipitor) tablet 40 mg, 40 mg, oral, Nightly, Radha Rodriguez PA-C    gabapentin (Neurontin) capsule 300 mg, 300 mg, oral, Nightly, Radha Rodriguez PA-C    lisinopril tablet 20 mg, 20 mg, oral, Daily, Radha Rodriguez PA-C, 20 mg at 04/18/24 0840    melatonin tablet 3 mg, 3 mg, oral, Nightly PRN, Shaista Bonilla MD    metoprolol tartrate (Lopressor) tablet 50 mg, 50 mg, oral, q12h, Radha Rodriguez PA-C, 50 mg at 04/18/24 0840    morphine injection 2 mg, 2 mg, intravenous, q4h PRN, Shaista Bonilla MD    ondansetron (Zofran) injection 4 mg, 4 mg, intravenous, q6h PRN, Shaista Bonilla MD    pantoprazole (ProtoNix) EC tablet 40 mg, 40 mg, oral, Daily before breakfast, Shaista Bonilla MD, 40 mg at 04/18/24 0700    piperacillin-tazobactam-dextrose (Zosyn) IV 3.375 g, 3.375 g, intravenous, q6h, Shaista Bonilla MD, Stopped at 04/18/24 1259    sodium chloride (Ocean) 0.65 % nasal spray 1 spray, 1 spray, Each Nostril, PRN, Radha Rodriguez PA-C    [Held by provider] spironolactone (Aldactone) tablet 25 mg, 25 mg, oral, Daily, Radha Rodriguez PA-C    traMADol (Ultram) tablet 50 mg, 50 mg, oral, q6h PRN, Shaista Bonilla MD    warfarin (Coumadin) tablet 3 mg, 3 mg, oral, Daily, Radha Rodriguez PA-C    Review of Systems  Respiratory ROS: no cough, shortness of breath, or wheezing  Cardiovascular ROS: no chest pain or dyspnea on exertion  Gastrointestinal ROS: positive for - abdominal pain  Neurological ROS: negative     Last Recorded Vitals  /66 (BP Location: Left arm, Patient Position: Lying)   Pulse 61   Temp 36.9 °C (98.4 °F) (Temporal)   Resp 18   Wt 66.3 kg (146 lb 3.2 oz)   SpO2 100%      Physical Exam  Orientation: oriented to person, place, time, and general circumstances  HEENT: normocephalic, atraumatic  Pulm: clear to auscultation bilaterally, no wheezes, good air entry  Cardiac: Regular rate and rhythm or  without murmur or extra heart sounds  GI:  Generalized abdominal discomfort to palpation. No pulsatile masses  Pulses: peripheral pulses symmetrical    Relevant Results    LABS:  Lab Results   Component Value Date    WBC 13.3 (H) 04/18/2024    HGB 12.4 04/18/2024    HCT 38.1 04/18/2024    MCV 81 04/18/2024     04/18/2024      Results from last 72 hours   Lab Units 04/18/24  0728   SODIUM mmol/L 137   POTASSIUM mmol/L 3.9   CHLORIDE mmol/L 107   CO2 mmol/L 19*   BUN mg/dL 14   CREATININE mg/dL 0.90   GLUCOSE mg/dL 82   CALCIUM mg/dL 9.5   ANION GAP mmol/L 15   EGFR mL/min/1.73m*2 66     Results from last 72 hours   Lab Units 04/17/24  1301   ALK PHOS U/L 75   BILIRUBIN TOTAL mg/dL 0.5   PROTEIN TOTAL g/dL 8.2   ALT U/L 9   AST U/L 26   ALBUMIN g/dL 4.6     Results from last 72 hours   Lab Units 04/18/24  0728   INR  2.0*       MICRO:  No results found for the last 14 days.      IMAGING:   CT chest abdomen pelvis w IV contrast   Final Result   CHEST:   1. No acute abnormalities within the chest.   2. Heavy aortic and coronary artery calcification.        ABDOMEN-PELVIS:   Significant wall thickening of a short segment of the sigmoid colon   with diverticulosis and question of wall abscess. Findings may be due   to recurrent severe acute diverticulitis. Given the degree of wall   thickening can not exclude neoplasm and clinical correlation and   follow-up is recommended.        MACRO:   None        Signed by: Sierra Kimble 4/17/2024 4:30 PM   Dictation workstation:   [id number]      XR chest 1 view   Final Result   1.  No evidence of acute cardiopulmonary process.                  MACRO:   None        Signed by: Sierra Kimble 4/17/2024 1:36 PM   Dictation workstation:   [id number]      Consult to Interventional Radiology    (Results Pending)          Assessment/Plan     This is a 78 y.o. female presenting with a past medical history of Aflutter on warfarin, complete heart block, anxiety, HTN, HLD, CABG, MVR,  CAD, and diverticulitis. Presented with diverticulitis and wall abscess of the sigmoid colon on CT A/P. Was admitted and started on Abx. Patient does report she has had ongoing diarrhea X3 weeks, flushing, intermittent nausea and abdominal pain. She denies fever, chills, vomiting, dysuria, frequency or urgency. IR consulted for abscess drain placement.     Patient clinically stable. She reports her abdominal pain has improved and is now a 3/10. WBC count is trending down on antimicrobial therapy. Sigmoid abscess appears small and circumscribed within the bowel wall. High risk for drain placement. No plan for IR drain. If she develops worsening s/s please repeat CT scan and consult IR to determine if a drain is able to be placed.     Lio Andrews, APRN-CNP    Time : Billing Time  Prep time on date of the patient encounter: 15 minutes.   Time spent directly with patient/family/caregiver: 15 minutes.   Documentation time: 15 minutes.   Total time (minutes):  45 minutes  Time Spent with this Patient (minutes).  More than 50% of This Time was Spent in Counseling and/or Coordination of Care

## 2024-04-19 ENCOUNTER — ANTICOAGULATION - WARFARIN VISIT (OUTPATIENT)
Dept: CARDIOLOGY | Facility: CLINIC | Age: 79
End: 2024-04-19
Payer: MEDICARE

## 2024-04-19 ENCOUNTER — APPOINTMENT (OUTPATIENT)
Dept: CARDIOLOGY | Facility: CLINIC | Age: 79
End: 2024-04-19
Payer: MEDICARE

## 2024-04-19 ENCOUNTER — PHARMACY VISIT (OUTPATIENT)
Dept: PHARMACY | Facility: CLINIC | Age: 79
End: 2024-04-19
Payer: COMMERCIAL

## 2024-04-19 VITALS
OXYGEN SATURATION: 99 % | HEIGHT: 65 IN | DIASTOLIC BLOOD PRESSURE: 66 MMHG | BODY MASS INDEX: 24.36 KG/M2 | SYSTOLIC BLOOD PRESSURE: 131 MMHG | HEART RATE: 57 BPM | WEIGHT: 146.2 LBS | RESPIRATION RATE: 18 BRPM | TEMPERATURE: 97.9 F

## 2024-04-19 DIAGNOSIS — Z95.2 S/P MVR (MITRAL VALVE REPLACEMENT): Primary | ICD-10-CM

## 2024-04-19 LAB
ANION GAP SERPL CALC-SCNC: 14 MMOL/L (ref 10–20)
BUN SERPL-MCNC: 9 MG/DL (ref 6–23)
CALCIUM SERPL-MCNC: 9.2 MG/DL (ref 8.6–10.3)
CHLORIDE SERPL-SCNC: 105 MMOL/L (ref 98–107)
CO2 SERPL-SCNC: 22 MMOL/L (ref 21–32)
CREAT SERPL-MCNC: 1.03 MG/DL (ref 0.5–1.05)
EGFRCR SERPLBLD CKD-EPI 2021: 56 ML/MIN/1.73M*2
ERYTHROCYTE [DISTWIDTH] IN BLOOD BY AUTOMATED COUNT: 14.5 % (ref 11.5–14.5)
GLUCOSE SERPL-MCNC: 86 MG/DL (ref 74–99)
HCT VFR BLD AUTO: 38.6 % (ref 36–46)
HGB BLD-MCNC: 12.7 G/DL (ref 12–16)
INR PPP: 2.3 (ref 0.9–1.1)
MCH RBC QN AUTO: 26.6 PG (ref 26–34)
MCHC RBC AUTO-ENTMCNC: 32.9 G/DL (ref 32–36)
MCV RBC AUTO: 81 FL (ref 80–100)
NRBC BLD-RTO: 0 /100 WBCS (ref 0–0)
PLATELET # BLD AUTO: 213 X10*3/UL (ref 150–450)
POTASSIUM SERPL-SCNC: 3.9 MMOL/L (ref 3.5–5.3)
PROTHROMBIN TIME: 26.4 SECONDS (ref 9.8–12.8)
RBC # BLD AUTO: 4.77 X10*6/UL (ref 4–5.2)
SODIUM SERPL-SCNC: 137 MMOL/L (ref 136–145)
WBC # BLD AUTO: 9.8 X10*3/UL (ref 4.4–11.3)

## 2024-04-19 PROCEDURE — 85610 PROTHROMBIN TIME: CPT

## 2024-04-19 PROCEDURE — 2500000004 HC RX 250 GENERAL PHARMACY W/ HCPCS (ALT 636 FOR OP/ED): Performed by: INTERNAL MEDICINE

## 2024-04-19 PROCEDURE — 2500000001 HC RX 250 WO HCPCS SELF ADMINISTERED DRUGS (ALT 637 FOR MEDICARE OP)

## 2024-04-19 PROCEDURE — 99233 SBSQ HOSP IP/OBS HIGH 50: CPT

## 2024-04-19 PROCEDURE — RXMED WILLOW AMBULATORY MEDICATION CHARGE

## 2024-04-19 PROCEDURE — 99232 SBSQ HOSP IP/OBS MODERATE 35: CPT | Performed by: SURGERY

## 2024-04-19 PROCEDURE — 85027 COMPLETE CBC AUTOMATED: CPT

## 2024-04-19 PROCEDURE — 2500000001 HC RX 250 WO HCPCS SELF ADMINISTERED DRUGS (ALT 637 FOR MEDICARE OP): Performed by: INTERNAL MEDICINE

## 2024-04-19 PROCEDURE — 99239 HOSP IP/OBS DSCHRG MGMT >30: CPT | Performed by: INTERNAL MEDICINE

## 2024-04-19 PROCEDURE — 80048 BASIC METABOLIC PNL TOTAL CA: CPT

## 2024-04-19 RX ORDER — AMOXICILLIN AND CLAVULANATE POTASSIUM 875; 125 MG/1; MG/1
1 TABLET, FILM COATED ORAL EVERY 8 HOURS
Qty: 27 TABLET | Refills: 0 | Status: SHIPPED | OUTPATIENT
Start: 2024-04-19 | End: 2024-04-28

## 2024-04-19 RX ADMIN — PIPERACILLIN SODIUM AND TAZOBACTAM SODIUM 3.38 G: 3; .375 INJECTION, SOLUTION INTRAVENOUS at 16:17

## 2024-04-19 RX ADMIN — ASPIRIN 81 MG: 81 TABLET, COATED ORAL at 08:43

## 2024-04-19 RX ADMIN — LISINOPRIL 20 MG: 20 TABLET ORAL at 08:43

## 2024-04-19 RX ADMIN — PANTOPRAZOLE SODIUM 40 MG: 40 TABLET, DELAYED RELEASE ORAL at 06:00

## 2024-04-19 RX ADMIN — METOPROLOL TARTRATE 50 MG: 50 TABLET, FILM COATED ORAL at 06:00

## 2024-04-19 RX ADMIN — AMLODIPINE BESYLATE 10 MG: 10 TABLET ORAL at 08:43

## 2024-04-19 RX ADMIN — PIPERACILLIN SODIUM AND TAZOBACTAM SODIUM 3.38 G: 3; .375 INJECTION, SOLUTION INTRAVENOUS at 10:47

## 2024-04-19 RX ADMIN — PIPERACILLIN SODIUM AND TAZOBACTAM SODIUM 3.38 G: 3; .375 INJECTION, SOLUTION INTRAVENOUS at 04:21

## 2024-04-19 ASSESSMENT — COGNITIVE AND FUNCTIONAL STATUS - GENERAL
MOBILITY SCORE: 24
DAILY ACTIVITIY SCORE: 24

## 2024-04-19 ASSESSMENT — PAIN SCALES - GENERAL: PAINLEVEL_OUTOF10: 0 - NO PAIN

## 2024-04-19 ASSESSMENT — PAIN - FUNCTIONAL ASSESSMENT: PAIN_FUNCTIONAL_ASSESSMENT: 0-10

## 2024-04-19 NOTE — CARE PLAN
The patient's goals for the shift include injury free    The clinical goals for the shift include remain free of pain

## 2024-04-19 NOTE — PROGRESS NOTES
"Neha Tsang is a 78 y.o. female on day 2 of admission presenting with Sepsis (Multi).    Assessment/Plan   Much improved today.  Consider transition to oral antibiotics and discharge home.  Diet advanced.  Will need colonoscopy in 6 to 8 weeks    Subjective   Patient feeling much better.  Resolution of pain       Objective     Physical Exam  NAD  A&Ox3  Non icteric  CTA  RR  Abdomen soft minimal tenderness left lower quadrant  Extremities warm, well perfused     Last Recorded Vitals  Blood pressure 149/62, pulse 61, temperature 36.6 °C (97.9 °F), temperature source Temporal, resp. rate 18, height 1.651 m (5' 5\"), weight 66.3 kg (146 lb 3.2 oz), SpO2 97%.  Intake/Output last 3 Shifts:  I/O last 3 completed shifts:  In: 50 (0.8 mL/kg) [IV Piggyback:50]  Out: 0 (0 mL/kg)   Weight: 66.3 kg     Relevant Results    Scheduled medications  amLODIPine, 10 mg, oral, Daily  aspirin, 81 mg, oral, Daily  atorvastatin, 40 mg, oral, Nightly  gabapentin, 300 mg, oral, Nightly  lisinopril, 20 mg, oral, Daily  metoprolol tartrate, 50 mg, oral, q12h  pantoprazole, 40 mg, oral, Daily before breakfast  piperacillin-tazobactam, 3.375 g, intravenous, q6h  [Held by provider] spironolactone, 25 mg, oral, Daily  warfarin, 3 mg, oral, Daily      Continuous medications     PRN medications  PRN medications: acetaminophen, melatonin, morphine, ondansetron, sodium chloride, traMADol    Results for orders placed or performed during the hospital encounter of 04/17/24 (from the past 24 hour(s))   Basic Metabolic Panel   Result Value Ref Range    Glucose 86 74 - 99 mg/dL    Sodium 137 136 - 145 mmol/L    Potassium 3.9 3.5 - 5.3 mmol/L    Chloride 105 98 - 107 mmol/L    Bicarbonate 22 21 - 32 mmol/L    Anion Gap 14 10 - 20 mmol/L    Urea Nitrogen 9 6 - 23 mg/dL    Creatinine 1.03 0.50 - 1.05 mg/dL    eGFR 56 (L) >60 mL/min/1.73m*2    Calcium 9.2 8.6 - 10.3 mg/dL   Protime-INR   Result Value Ref Range    Protime 26.4 (H) 9.8 - 12.8 seconds    " INR 2.3 (H) 0.9 - 1.1   CBC   Result Value Ref Range    WBC 9.8 4.4 - 11.3 x10*3/uL    nRBC 0.0 0.0 - 0.0 /100 WBCs    RBC 4.77 4.00 - 5.20 x10*6/uL    Hemoglobin 12.7 12.0 - 16.0 g/dL    Hematocrit 38.6 36.0 - 46.0 %    MCV 81 80 - 100 fL    MCH 26.6 26.0 - 34.0 pg    MCHC 32.9 32.0 - 36.0 g/dL    RDW 14.5 11.5 - 14.5 %    Platelets 213 150 - 450 x10*3/uL           I spent 25 minutes in the professional and overall care of this patient.      Pino Campbell MD

## 2024-04-19 NOTE — PROGRESS NOTES
"Neha Tsang is a 78 y.o. female on day 2 of admission presenting with Sepsis (Multi).    Subjective   Patient is seen laying in bed in room alone. She states she feels much better today. She rates her abdominal tenderness a 2/10. She denies any SOB, CP, palpitations, leg swelling, issues with urination. States her stools have been light/yellow in color and liquid, but this AM she had one small bowel movement that was dark brown (clarified not black) and more solid.        Objective     Physical Exam  Cardiovascular:      Rate and Rhythm: Normal rate and regular rhythm.   Pulmonary:      Effort: Pulmonary effort is normal.      Breath sounds: Normal breath sounds.   Abdominal:      General: Bowel sounds are normal. There is no distension.      Palpations: Abdomen is soft.      Tenderness: There is abdominal tenderness (LLQ). There is no guarding.   Musculoskeletal:      Right lower leg: No edema.      Left lower leg: No edema.   Skin:     General: Skin is warm and dry.   Neurological:      General: No focal deficit present.         Last Recorded Vitals  Blood pressure 149/62, pulse 61, temperature 36.6 °C (97.9 °F), temperature source Temporal, resp. rate 18, height 1.651 m (5' 5\"), weight 66.3 kg (146 lb 3.2 oz), SpO2 97%.  Intake/Output last 3 Shifts:  I/O last 3 completed shifts:  In: 50 (0.8 mL/kg) [IV Piggyback:50]  Out: 0 (0 mL/kg)   Weight: 66.3 kg     Relevant Results  Scheduled medications  amLODIPine, 10 mg, oral, Daily  aspirin, 81 mg, oral, Daily  atorvastatin, 40 mg, oral, Nightly  gabapentin, 300 mg, oral, Nightly  lisinopril, 20 mg, oral, Daily  metoprolol tartrate, 50 mg, oral, q12h  pantoprazole, 40 mg, oral, Daily before breakfast  piperacillin-tazobactam, 3.375 g, intravenous, q6h  [Held by provider] spironolactone, 25 mg, oral, Daily  warfarin, 3 mg, oral, Daily      Continuous medications     PRN medications  PRN medications: acetaminophen, melatonin, morphine, ondansetron, sodium " chloride, traMADol  Results for orders placed or performed during the hospital encounter of 04/17/24 (from the past 24 hour(s))   Basic Metabolic Panel   Result Value Ref Range    Glucose 86 74 - 99 mg/dL    Sodium 137 136 - 145 mmol/L    Potassium 3.9 3.5 - 5.3 mmol/L    Chloride 105 98 - 107 mmol/L    Bicarbonate 22 21 - 32 mmol/L    Anion Gap 14 10 - 20 mmol/L    Urea Nitrogen 9 6 - 23 mg/dL    Creatinine 1.03 0.50 - 1.05 mg/dL    eGFR 56 (L) >60 mL/min/1.73m*2    Calcium 9.2 8.6 - 10.3 mg/dL   Protime-INR   Result Value Ref Range    Protime 26.4 (H) 9.8 - 12.8 seconds    INR 2.3 (H) 0.9 - 1.1   CBC   Result Value Ref Range    WBC 9.8 4.4 - 11.3 x10*3/uL    nRBC 0.0 0.0 - 0.0 /100 WBCs    RBC 4.77 4.00 - 5.20 x10*6/uL    Hemoglobin 12.7 12.0 - 16.0 g/dL    Hematocrit 38.6 36.0 - 46.0 %    MCV 81 80 - 100 fL    MCH 26.6 26.0 - 34.0 pg    MCHC 32.9 32.0 - 36.0 g/dL    RDW 14.5 11.5 - 14.5 %    Platelets 213 150 - 450 x10*3/uL     Electrocardiogram, 12-lead PRN ACS symptoms    Result Date: 4/18/2024  Atrial-paced rhythm with Premature atrial complexes Abnormal ECG When compared with ECG of 12-APR-2024 08:38, Premature atrial complexes are now Present See ED provider note for full interpretation and clinical correlation Confirmed by Shira Willis ([id number]) on 4/18/2024 10:23:30 AM    CT chest abdomen pelvis w IV contrast    Result Date: 4/17/2024  Interpreted By:  Sierra Kimble, STUDY: CT CHEST ABDOMEN PELVIS W IV CONTRAST;  4/17/2024 3:47 pm   INDICATION: Signs/Symptoms:Chest tightness, profuse diarrhea and abdominal cramping.   COMPARISON:   08/21/2023   ACCESSION NUMBER(S): [id number]   ORDERING CLINICIAN: CARLY LOWRY   TECHNIQUE: CT of the chest, abdomen, and pelvis was performed.  Contiguous axial images were obtained at 3 mm slice thickness through the chest, abdomen and pelvis. Coronal and sagittal reconstructions at 3 mm slice thickness were performed. 75 cc Omnipaque 350 administered intravenously  without immediate complication.   FINDINGS: CHEST:   LUNG/PLEURA/LARGE AIRWAYS: No suspicious lung nodules or infiltrates. Likely bibasilar atelectasis and/or scarring.   VESSELS: Aortic calcification. Normal caliber aorta. HEART: No cardiomegaly. No pericardial effusion. Dense coronary artery calcification.   MEDIASTINUM AND MARIE: No significant mediastinal or hilar adenopathy.   CHEST WALL AND LOWER NECK: Grossly unremarkable thyroid gland. Sternal wires.   ABDOMEN:   LIVER: The liver is grossly unremarkable except for likely focal area of fatty infiltration adjacent to the falciform ligament.   BILE DUCTS: No bile duct dilatation.   GALLBLADDER: No definite gallstones.   PANCREAS: Unremarkable.   SPLEEN: Unremarkable spleen.   ADRENAL GLANDS: Left adrenal nodule similar the prior exam.   KIDNEYS AND URETERS: Cortical thinning of both kidneys. Likely simple renal cysts bilaterally. No hydronephrosis.   PELVIS:   BLADDER: The bladder is incompletely distended and evaluated.   REPRODUCTIVE ORGANS: No definite masses.   BOWEL: There is again significant sigmoid wall thickening with surrounding fat stranding and inflammatory changes. There is also a fluid collection with air-fluid level likely arising from the sigmoid wall measuring approximately 2 cm. Diverticulosis.     VESSELS: Heavy aortic calcification.. No aneurysmal dilatation.   PERITONEUM/RETROPERITONEUM/LYMPH NODES: No retroperitoneal adenopathy. No ascites.   BONE AND SOFT TISSUE: Scoliosis and discogenic degenerative changes.       CHEST: 1. No acute abnormalities within the chest. 2. Heavy aortic and coronary artery calcification.   ABDOMEN-PELVIS: Significant wall thickening of a short segment of the sigmoid colon with diverticulosis and question of wall abscess. Findings may be due to recurrent severe acute diverticulitis. Given the degree of wall thickening can not exclude neoplasm and clinical correlation and follow-up is recommended.   MACRO: None    Signed by: Sierra Kimble 4/17/2024 4:30 PM Dictation workstation:   [id number]    XR chest 1 view    Result Date: 4/17/2024  Interpreted By:  Sierra Kimble, STUDY: XR CHEST 1 VIEW;  4/17/2024 1:34 pm   INDICATION: Signs/Symptoms:CP.   COMPARISON: 01/03/2023   ACCESSION NUMBER(S): [id number]   ORDERING CLINICIAN: CARLY LOWRY   FINDINGS:         CARDIOMEDIASTINAL SILHOUETTE: Cardiomediastinal silhouette is normal in size and configuration. Cardiac pacer. Sternal wires. Aortic calcification.   LUNGS: Lungs are clear.   ABDOMEN: No remarkable upper abdominal findings.   BONES: No acute osseous changes.       1.  No evidence of acute cardiopulmonary process.       MACRO: None   Signed by: Sierra Kimble 4/17/2024 1:36 PM Dictation workstation:   [id number]    ECG 12 Lead    Result Date: 4/15/2024  Electronic atrial pacemaker Low voltage QRS Borderline ECG Confirmed by Carlos Valencia ([id number]) on 4/15/2024 12:32:12 PM       Assessment/Plan   78-year-old female with a history of recurrent diverticulitis, complete heart block, SVT, a fib, a flutter, pacemaker, CAD status post CABG, mitral valve replacement, tricuspid repair (on Coumadin), diastolic HF, HLD, HTN, recurrent urinary tract infection, hemorrhoids, presents with abdominal cramping and diarrhea. She was reportedly diagnosed with diverticulitis within the past several weeks. She has since been seen by surgery who determined she has diverticulosis and questionable wall abscess.      Diverticulitis, (?) wall abscess  Hx recurrent diverticulitis  -improving, leukocytosis resolved   -surgery saw & recommends conservative treatment at this time  -IR consult to evaluate for aspiration/drainage, determined too small to drain   -discontinued IV Zosyn, started PO augmentin to be discharged   -tolerating food, low fiber diet   -colonoscopy in 6-8wks      CAD s/p CABG, MVR, Tricuspid repair  A flutter ablation with pacemaker placement  A fib  SVT  -ASA  -On warfarin, INR  was 2.3     Recurrent UTIs  -UA shows 20 ketones, 75 leuk est, +1 RBC, >1.050 specific gravity  -urine culture shows no significant growth      Diastolic HF  -metoprolol  -held spironolactone  -appeared euvolemic on exam      HTN  -amlodipine  -lisinopril   -metoprolol     HLD  -atorvastatin     Hemorrhoids  -stable     VTE/GI prophylaxis: SCDs warfarin, pantoprazole      Med ready for discharge home today      Discussed with: Dr. Chad PATEL spent 60 minutes in the professional and overall care of this patient.      Radha Rodriguez PA-C

## 2024-04-19 NOTE — CARE PLAN
The patient's goals for the shift include less pain    The clinical goals for the shift include will remain safe    Over the shift, the patient did not make progress toward the following goals. Barriers to progression include pain and discomfort. Recommendations to address these barriers include requesting PRN pain medication while pain is tolerable.

## 2024-04-19 NOTE — DISCHARGE SUMMARY
Discharge Diagnosis  Sepsis (Multi)    Issues Requiring Follow-Up  Colonoscopy 3 months    Test Results Pending At Discharge  Pending Labs       Order Current Status    Extra Urine Gray Tube Collected (04/17/24 1716)    Urinalysis with Reflex Culture and Microscopic In process            Hospital Course  Patient with a past medical history of hypertension dyslipidemia coronary artery disease s/p bypass history of complete heart block history of atrial flutter currently on Coumadin and history of diverticulitis in the past was doing well until a few days ago when she started having some abdominal pain and discomfort with diarrhea  This was preceded by diarrhea for the last 3 weeks  We started the patient on IV Zosyn  IR was consulted but they were of the opinion that the abscess was too small to drain  Patient has shown significant improvement on antibiotics and 1 day  Minimal tenderness  Appetite improving  We will switch the patient to oral Augmentin and discharge    Strongly advised the patient to get a colonoscopy done in 3 months  She agrees to follow-up on that    Pertinent Physical Exam At Time of Discharge  Physical Exam    Constitutional   General appearance: Alert and in no acute distress.     Pulmonary   Respiratory assessment: No respiratory distress, normal respiratory rhythm and effort.    Auscultation of Lungs: Clear bilateral breath sounds.   Cardiovascular   Auscultation of heart: Apical pulse normal, heart rate and rhythm normal, normal S1 and S2, no murmurs and no pericardial rub.    Exam for edema: No peripheral edema.   Abdomen   Abdominal Exam: No bruits, normal bowel sounds, soft, non-tender, no abdominal mass palpated.    Liver and Spleen exam: No hepato-splenomegaly.   Musculoskeletal   Examination of gait: Normal.    Inspection of digits and nails: No clubbing or cyanosis of the fingernails.    Inspection/palpation of joints, bones and muscles: No joint swelling. Normal movement of all  extremities.   Skin   Skin inspection: Normal skin color and pigmentation, normal skin turgor and no visible rash.   Neurologic   Cranial nerves: Nerves 2-12 were intact, no focal neuro defects.       Home Medications     Medication List      ASK your doctor about these medications     acetaminophen 325 mg tablet; Commonly known as: Tylenol   amLODIPine 10 mg tablet; Commonly known as: Norvasc; Take 1 tablet (10   mg) by mouth once daily.   aspirin 81 mg EC tablet   atorvastatin 40 mg tablet; Commonly known as: Lipitor; Take 1 tablet (40   mg) by mouth once daily.   calcium carbonate-vitamin D3 600 mg-20 mcg (800 unit) tablet   cholecalciferol 125 MCG (5000 UT) capsule; Commonly known as: Vitamin   D-3   docusate sodium 100 mg capsule; Commonly known as: Colace; Take 1   capsule (100 mg) by mouth 2 times a day as needed for constipation.   ferrous sulfate (325 mg ferrous sulfate) tablet   gabapentin 300 mg capsule; Commonly known as: Neurontin   lisinopril 20 mg tablet; Take 1 tablet (20 mg) by mouth once daily.   magnesium oxide 400 mg (241.3 mg magnesium) tablet; Commonly known as:   Mag-Ox   metoprolol tartrate 50 mg tablet; Commonly known as: Lopressor; Take 1   tablet by mouth every 12 hours.   omeprazole 40 mg DR capsule; Commonly known as: PriLOSEC; TAKE 1   CAPSULE(40 MG) BY MOUTH DAILY. DO NOT CRUSH OR CHEW   sodium chloride 0.65 % nasal spray; Commonly known as: Ocean   spironolactone 25 mg tablet; Commonly known as: Aldactone; Take 1 tablet   (25 mg) by mouth once daily.   warfarin 3 mg tablet; Commonly known as: Coumadin; Take as directed. If   you are unsure how to take this medication, talk to your nurse or doctor.;   Original instructions: Take 1 tablet (3 mg) by mouth once daily in the   evening. Take as directed       Outpatient Follow-Up  Future Appointments   Date Time Provider Department Center   4/24/2024 10:30 AM [facility] [id number]   10/11/2024 10:00 AM Carlos  DEMAR Vlaencia MD [id number] [facility]     Patient seen at bedside. Events from the last visit reviewed. Discussed with staff. Results of tests and investigations from last visit reviewed and discussed with patient/Family. Electronic chart on [facility] reviewed. Input / Recommendations  from consultants  appreciated and reviewed and agreed with.     discharge summary and profile completed. medications reviewed and discussed with patient and family.  scripts completed and signed.     total discharge time in excess of 30 minutes.    Shaista Bonilla MD

## 2024-04-22 ENCOUNTER — PATIENT OUTREACH (OUTPATIENT)
Dept: PRIMARY CARE | Facility: CLINIC | Age: 79
End: 2024-04-22
Payer: MEDICARE

## 2024-04-22 DIAGNOSIS — T36.95XA ANTIBIOTIC-INDUCED YEAST INFECTION: Primary | ICD-10-CM

## 2024-04-22 DIAGNOSIS — I10 PRIMARY HYPERTENSION: ICD-10-CM

## 2024-04-22 DIAGNOSIS — B37.9 ANTIBIOTIC-INDUCED YEAST INFECTION: Primary | ICD-10-CM

## 2024-04-22 RX ORDER — FLUCONAZOLE 150 MG/1
150 TABLET ORAL ONCE
Qty: 2 TABLET | Refills: 0 | Status: SHIPPED | OUTPATIENT
Start: 2024-04-22 | End: 2024-04-22

## 2024-04-22 NOTE — PROGRESS NOTES
Discharge Facility: [facility]  Discharge Diagnosis: SEPSIS  Admission Date: 4-  Discharge Date: 4-    PCP Appointment Date: 5-2-2024  Specialist Appointment Date:   -GASTROENTEROLOGY FU - COLONOSCOPY IN 3 MONTHS     Hospital Encounter and Summary: Linked   See discharge assessment below for further details  Engagement  Call Start Time: 1158 (4/22/2024 11:58 AM)    Medications  Medications reviewed with patient/caregiver?: Yes (4/22/2024 11:58 AM)  Is the patient having any side effects they believe may be caused by any medication additions or changes?: No (4/22/2024 11:58 AM)  Does the patient have all medications ordered at discharge?: Yes (4/22/2024 11:58 AM)  Care Management Interventions: Provided patient education (4/22/2024 11:58 AM)  Prescription Comments: START: ///// 1.amoxicillin-pot clavulanate 875-125 mg  tablet  (Augmentin )  Take 1 tablet by mouth every 8 hours for 9 days ////// PATIENT HAS SCRIPT. DENIES QUESTIONS. (4/22/2024 11:58 AM)  Is the patient taking all medications as directed (includes completed medication regime)?: Yes (4/22/2024 11:58 AM)  Care Management Interventions: Provided patient education (4/22/2024 11:58 AM)  Medication Comments: Patient denies any questions or concerns about their medications once they are home. Verbalize an understanding regarding how to take their medications and which medications they should be taking. No issues reported in regards to accessibility affordability adherence.They deny the need for any medication refills at this time. (4/22/2024 11:58 AM)    Appointments  Does the patient have a primary care provider?: Yes (ACTIVE WITH PCP) (4/22/2024 11:58 AM)  Care Management Interventions: Verified appointment date/time/provider (4/22/2024 11:58 AM)  Has the patient kept scheduled appointments due by today?: Yes (4/22/2024 11:58 AM)  Care Management Interventions: Advised to schedule with specialist (4/22/2024 11:58 AM)    Self  Management  What is the home health agency?: NA  DECLINED NEED (2024 11:58 AM)  Has home health visited the patient within 72 hours of discharge?: Not applicable (2024 11:58 AM)  What Durable Medical Equipment (DME) was ordered?: NA (2024 11:58 AM)    Patient Teaching  Does the patient have access to their discharge instructions?: Yes (2024 11:58 AM)  Care Management Interventions: Reviewed instructions with patient (2024 11:58 AM)  What is the patient's perception of their health status since discharge?: Improving (2024 11:58 AM)  Is the patient/caregiver able to teach back the hierarchy of who to call/visit for symptoms/problems? PCP, Specialist, Home Health nurse, Urgent Care, ED, 911: Yes (2024 11:58 AM)  Patient/Caregiver Education Comments: TAKING ATB AS ORDERED. DENIES N/V/ F/C OR ABDOMINAL PAIN.  EATING LOW FIBER/ LOW RESIDUE DIET. STATES SHE IS FAMILIAR WITH THE DIET FROM PREVIOUS ENCOUNTERS WITH DIVERTICULITIS.  ENCOURAGED TO CALL AND SCHEDULE FU FOR COLONOSCOPY IN 3 MONTHS.  REPORTS FEELING SHE HAS A YEAST INFECTION. REACHED OUT TO OFFICE TODAY FOR DIFLUCAN SCRIPT AND WILL  AT PHARMACY TODAY. REPORTS SOME HESITANCY WITH URINATION BUT DENIES BURNING, URGENCY, HEMATURIA.  URINE CX FROM HOSPITAL NO SIGNIFICANT GROWTH. DISCUSSED SXS UTI ENCOURAGED TO CALL PCP SHOULD SYMPTOMS ARISE. (2024 11:58 AM)    Wrap Up  Wrap Up Additional Comments: Spoke w/ pt. Pt identified by name and . Reviewed discharge instructions, medications, and follow up. Patient denies any further discharge questions/needs at this time.Please take all medications as prescribed and keep all follow-up appointments. Emphasized the importance of hospital follow up.Follow up is needed after discharge to review the hospital recommendations,assess your response to your treatment and make further recommendations for your transition of care.                                                                                                                                                                                                                   Contact your primary care physician with any questions or concerns that arise.You may contact your outpatient specialists office for any questions regarding specifics relating to their recommendations. Confirms they will attend the scheduled follow up appointment Aware of my availability for non emergent concerns (4/22/2024 11:58 AM)  Call End Time: 1217 (4/22/2024 11:58 AM)

## 2024-04-23 RX ORDER — LISINOPRIL 20 MG/1
20 TABLET ORAL DAILY
Qty: 90 TABLET | Refills: 1 | Status: SHIPPED | OUTPATIENT
Start: 2024-04-23 | End: 2024-05-02 | Stop reason: ALTCHOICE

## 2024-04-24 ENCOUNTER — ANTICOAGULATION - WARFARIN VISIT (OUTPATIENT)
Dept: CARDIOLOGY | Facility: CLINIC | Age: 79
End: 2024-04-24
Payer: MEDICARE

## 2024-04-24 DIAGNOSIS — Z95.2 S/P MVR (MITRAL VALVE REPLACEMENT): Primary | ICD-10-CM

## 2024-04-24 LAB
POC INR: 2.4
POC PROTHROMBIN TIME: NORMAL

## 2024-04-24 PROCEDURE — 99211 OFF/OP EST MAY X REQ PHY/QHP: CPT

## 2024-04-24 PROCEDURE — 85610 PROTHROMBIN TIME: CPT | Mod: QW

## 2024-04-24 NOTE — PROGRESS NOTES
Patient identification verified with 2 identifiers.    Location: [facility] - [address] [address] [phone] option #1     Referring Physician: Dr. Carlos Valencia  Enrollment/ Re-enrollment date: 24   INR Goal: 2.0-3.0  INR monitoring is per AMS protocol.  Anticoagulation Medication: warfarin  Indication: Mitral Valve Replacement    Subjective   Bleeding signs/symptoms: No    Bruising: No   Major bleeding event: No  Thrombosis signs/symptoms: No  Thromboembolic event: No  Missed doses: No  Extra doses: No  Medication changes: Yes  on antibiotics  Dietary changes: No  Change in health: No  Change in activity: No  Alcohol: No  Other concerns: No    Upcoming Procedures:  Does the Patient Have any upcoming procedures that require interruption in anticoagulation therapy? no  Does the patient require bridging? no      Anticoagulation Summary  As of 2024      INR goal:  2.0-3.0   TTR:  68.1% (6.4 mo)   INR used for dosin.40 (2024)   Weekly warfarin total:  19.5 mg               Assessment/Plan   Therapeutic  Maintain TWD  RTC in 5 days as pt has 4 more days of antibiotic left      Education provided to patient during the visit:  Patient instructed to call in interim with questions, concerns and changes.   Patient educated on interactions between medications and warfarin.   Patient educated on dietary consistency in vitamin k consumption.   Patient educated on affects of alcohol consumption while taking warfarin.   Patient educated on signs of bleeding/clotting.   Patient educated on compliance with dosing, follow up appointments, and prescribed plan of care.

## 2024-04-25 NOTE — DOCUMENTATION CLARIFICATION NOTE
PATIENT:               VIOLETA SEVILLA  ACCT #:                  [id number]  MRN:                       [id number]  :                       1945  ADMIT DATE:       2024 12:36 PM  DISCH DATE:        2024 5:17 PM  RESPONDING PROVIDER #:        [id number]          PROVIDER RESPONSE TEXT:    Sepsis was a differential diagnosis and ruled out after study    CDI QUERY TEXT:    Clarification      Instruction:  Based on your assessment of the patient and the clinical information, please provide the requested documentation by clicking on the appropriate radio button and enter any additional information if prompted.    Question: Sepsis was documented in the medical record. Based on the documentation and the clinical information, can the diagnosis be further clarified as      When answering this query, please exercise your independent professional judgment. The fact that a question is being asked, does not imply that any particular answer is desired or expected.    The patient's clinical indicators include:  Clinical Information:  77 y/o female presents to [facility] c/o general weakness, chest pain, diarrhea. DX Diverticulitis with abscess, Intra-abdominal abscess.    Documented Diagnosis:  ED note per GEORGIA Iverson DO : ?Discharge Diagnoses Sepsis (Multi)?  General Surgery note per FABIENNE Campbell MD : ?admission presenting with Sepsis (Multi).?  DC Summary per KATIE Bonilla MD : ?Discharge Diagnosis Sepsis (Multi)?    Clinical Indicators:  Vital Signs trend  - : T 36.4 - 37.1, HR 60 - 81, RR 16 - 18,  - 177    Labs during admission  - :  WBC 17.9 - 13.3 - 9.8  Neutrophils 11.42  Lactate - Not drawn  BUN 20 - 14 - 9 / Creatinine 0.97 - 0.90 - 1.03  Bilirubin Total 0.5  [place] Coma Scale 15 per ED  Procalcitonin - Not drawn  Platelets 227 - 216 - 213    No ID consult    Microbiology Results: Blood cultures - None drawn    Treatment: IV Zosyn  - , IV Vancomycin x1 , IVF  Bolus NS 1L 04/17  Risk Factors: PMH: CAD s/p CABG, HTN, HLD  Options provided:  -- Sepsis was a differential diagnosis and ruled out after study  -- Sepsis with other organ dysfunction, Please specify sepsis associated organ dysfunction below  -- Other - I will add my own diagnosis  -- Refer to Clinical Documentation Reviewer    Query created by: Cassy Campbell on 4/22/2024 12:25 PM      Electronically signed by:  RONN FIGUEROA MD 4/25/2024 10:49 AM

## 2024-04-29 ENCOUNTER — ANTICOAGULATION - WARFARIN VISIT (OUTPATIENT)
Dept: CARDIOLOGY | Facility: CLINIC | Age: 79
End: 2024-04-29
Payer: MEDICARE

## 2024-04-29 DIAGNOSIS — Z95.2 S/P MVR (MITRAL VALVE REPLACEMENT): Primary | ICD-10-CM

## 2024-04-29 LAB
POC INR: 2.6
POC PROTHROMBIN TIME: NORMAL

## 2024-04-29 PROCEDURE — 99211 OFF/OP EST MAY X REQ PHY/QHP: CPT

## 2024-04-29 PROCEDURE — 85610 PROTHROMBIN TIME: CPT | Mod: QW

## 2024-04-29 NOTE — PROGRESS NOTES
Patient identification verified with 2 identifiers.    Location: [facility] at [facility] - [address] [address] [phone] option #1      Referring Physician: Dr. Carlos Valencia  Enrollment/ Re-enrollment date: 24   INR Goal: 2.0-3.0  INR monitoring is per AMS protocol.  Anticoagulation Medication: warfarin  Indication: Mitral Valve Replacement    Subjective   Bleeding signs/symptoms: No    Bruising: No   Major bleeding event: No  Thrombosis signs/symptoms: No  Thromboembolic event: No  Missed doses: No  Extra doses: No  Medication changes: No  Dietary changes: No  Change in health: No  Change in activity: No  Alcohol: No  Other concerns: No    Upcoming Procedures:  Does the Patient Have any upcoming procedures that require interruption in anticoagulation therapy? no  Does the patient require bridging? no      Anticoagulation Summary  As of 2024      INR goal:  2.0-3.0   TTR:  68.8% (6.6 mo)   INR used for dosin.60 (2024)   Weekly warfarin total:  19.5 mg               Assessment/Plan   Therapeutic     1. New dose: no change    2. Next INR: 1 week      Education provided to patient during the visit:  Patient instructed to call in interim with questions, concerns and changes.   Patient educated on interactions between medications and warfarin.   Patient educated on compliance with dosing, follow up appointments, and prescribed plan of care.

## 2024-05-01 ENCOUNTER — HOSPITAL ENCOUNTER (OUTPATIENT)
Dept: CARDIOLOGY | Facility: CLINIC | Age: 79
Discharge: HOME | End: 2024-05-01
Payer: MEDICARE

## 2024-05-01 DIAGNOSIS — I44.2 ATRIOVENTRICULAR BLOCK, COMPLETE (MULTI): ICD-10-CM

## 2024-05-01 DIAGNOSIS — Z95.0 PRESENCE OF CARDIAC PACEMAKER: ICD-10-CM

## 2024-05-02 ENCOUNTER — OFFICE VISIT (OUTPATIENT)
Dept: PRIMARY CARE | Facility: CLINIC | Age: 79
End: 2024-05-02
Payer: MEDICARE

## 2024-05-02 VITALS
DIASTOLIC BLOOD PRESSURE: 71 MMHG | BODY MASS INDEX: 23.99 KG/M2 | WEIGHT: 144 LBS | SYSTOLIC BLOOD PRESSURE: 115 MMHG | HEART RATE: 83 BPM | OXYGEN SATURATION: 99 % | HEIGHT: 65 IN

## 2024-05-02 DIAGNOSIS — Z95.1 S/P CABG (CORONARY ARTERY BYPASS GRAFT): ICD-10-CM

## 2024-05-02 DIAGNOSIS — D68.9 COAGULATION DEFECT, UNSPECIFIED (MULTI): ICD-10-CM

## 2024-05-02 DIAGNOSIS — I48.3 TYPICAL ATRIAL FLUTTER (MULTI): ICD-10-CM

## 2024-05-02 DIAGNOSIS — Z79.899 MEDICATION MANAGEMENT: ICD-10-CM

## 2024-05-02 DIAGNOSIS — I50.32 CHRONIC DIASTOLIC (CONGESTIVE) HEART FAILURE (MULTI): ICD-10-CM

## 2024-05-02 DIAGNOSIS — Z09 HOSPITAL DISCHARGE FOLLOW-UP: Primary | ICD-10-CM

## 2024-05-02 DIAGNOSIS — M47.816 LUMBAR SPONDYLOSIS: ICD-10-CM

## 2024-05-02 PROCEDURE — 3078F DIAST BP <80 MM HG: CPT | Performed by: STUDENT IN AN ORGANIZED HEALTH CARE EDUCATION/TRAINING PROGRAM

## 2024-05-02 PROCEDURE — 1036F TOBACCO NON-USER: CPT | Performed by: STUDENT IN AN ORGANIZED HEALTH CARE EDUCATION/TRAINING PROGRAM

## 2024-05-02 PROCEDURE — 99495 TRANSJ CARE MGMT MOD F2F 14D: CPT | Performed by: STUDENT IN AN ORGANIZED HEALTH CARE EDUCATION/TRAINING PROGRAM

## 2024-05-02 PROCEDURE — 3074F SYST BP LT 130 MM HG: CPT | Performed by: STUDENT IN AN ORGANIZED HEALTH CARE EDUCATION/TRAINING PROGRAM

## 2024-05-02 PROCEDURE — 1160F RVW MEDS BY RX/DR IN RCRD: CPT | Performed by: STUDENT IN AN ORGANIZED HEALTH CARE EDUCATION/TRAINING PROGRAM

## 2024-05-02 PROCEDURE — 1111F DSCHRG MED/CURRENT MED MERGE: CPT | Performed by: STUDENT IN AN ORGANIZED HEALTH CARE EDUCATION/TRAINING PROGRAM

## 2024-05-02 PROCEDURE — 1159F MED LIST DOCD IN RCRD: CPT | Performed by: STUDENT IN AN ORGANIZED HEALTH CARE EDUCATION/TRAINING PROGRAM

## 2024-05-02 PROCEDURE — 1157F ADVNC CARE PLAN IN RCRD: CPT | Performed by: STUDENT IN AN ORGANIZED HEALTH CARE EDUCATION/TRAINING PROGRAM

## 2024-05-02 PROCEDURE — 99214 OFFICE O/P EST MOD 30 MIN: CPT | Performed by: STUDENT IN AN ORGANIZED HEALTH CARE EDUCATION/TRAINING PROGRAM

## 2024-05-02 RX ORDER — LISINOPRIL 10 MG/1
10 TABLET ORAL DAILY
Qty: 100 TABLET | Refills: 3 | Status: SHIPPED | OUTPATIENT
Start: 2024-05-02 | End: 2025-06-06

## 2024-05-02 RX ORDER — ACETAMINOPHEN AND CODEINE PHOSPHATE 300; 15 MG/1; MG/1
1 TABLET ORAL EVERY 6 HOURS PRN
Qty: 60 TABLET | Refills: 0 | Status: SHIPPED | OUTPATIENT
Start: 2024-05-02 | End: 2024-06-01

## 2024-05-02 NOTE — PROGRESS NOTES
"Subjective   Patient ID: Neha Tsang is a 78 y.o. female who presents for Hospital Follow-up (Hospital follow-up. ).        HPI  77y/o female with HTN, HPL , CAD s/p CABG , mitral and tricuspid valve repair , atrial flutter ablation and pacemaker placement in 2019, ON Ac, est with coumadin clinic , recurrent diverticulitis , advanced DJD L spine       Hosp dc fu  -   Was hospitalized  for abd pain and treated for diverticulitis   She reports feeling much better   Upcoming appt with GI     Severe spondylosis of L spine , pt taking excessive tylenol   Limiting her ability to perform ADLs         Visit Vitals  /71   Pulse 83   Ht 1.651 m (5' 5\")   Wt 65.3 kg (144 lb)   SpO2 99%   BMI 23.96 kg/m²   Smoking Status Former   BSA 1.73 m²      No LMP recorded.   Current Outpatient Medications   Medication Instructions    acetaminophen (Tylenol) 325 mg tablet oral, Every 4 hours PRN    amLODIPine (NORVASC) 10 mg, oral, Daily    aspirin 81 mg EC tablet oral    atorvastatin (LIPITOR) 40 mg, oral, Daily    cholecalciferol (Vitamin D-3) 125 MCG (5000 UT) capsule oral    ferrous sulfate 325 (65 Fe) MG tablet 65 mg of iron, oral    gabapentin (NEURONTIN) 300 mg, oral, Nightly    lisinopril 10 mg, oral, Daily    magnesium oxide (Mag-Ox) 400 mg (241.3 mg magnesium) tablet 1 tablet, oral, Daily    metoprolol tartrate (LOPRESSOR) 50 mg, oral, Every 12 hours    sodium chloride (Ocean) 0.65 % nasal spray 1 spray, nasal, As needed    spironolactone (ALDACTONE) 25 mg, oral, Daily    warfarin (COUMADIN) 3 mg, oral, Every evening, Take as directed      Social History     Tobacco Use    Smoking status: Former     Current packs/day: 0.00     Types: Cigarettes     Quit date:      Years since quittin.3    Smokeless tobacco: Never   Substance Use Topics    Alcohol use: Not Currently        Review of Systems    Constitutional : No feeling poorly / fevers/ chills / night sweats/ fatigue   Cardiovascular : No CP " /Palpitations/ lower extremity edema / syncope   Respiratory : No Cough /LARA/Dyspnea at rest   Gastrointestinal : No abd pain / N/V  No bloody stools/ melena / constipation  Endo : No polyuria/polydipsia/ muscle weakness / sluggishness   CNS: No confusion / HA/ tingling/ numbness/ weakness of extremities  Psychiatric: No anxiety/ depression/ SI/HI    All other systems have been reviewed and are negative for complaint       Physical Exam    Constitutional : Vitals reviewed. Alert and in no distress  Cardiovascular : RRR, Normal S1, S2, No pericardial rub/ gallop, no peripheral edema   Pulmonary: No respiratory distress, CTAB   MSK : Normal gait and station , strength and tone   Skin: Warm to touch ,  normal skin turgor   Neurologic : CNs 2-12 grossly intact , no obvious FNDs  Psych : A,Ox3, normal mood and affect      Assessment/Plan   Diagnoses and all orders for this visit:  Chronic diastolic (congestive) heart failure (Multi)  Coagulation defect, unspecified (Multi)  Typical atrial flutter (Multi)  Medication management  -     Opiate/Opioid/Benzo Prescription Compliance; Future  Lumbar spondylosis  S/P CABG (coronary artery bypass graft)  -     lisinopril 10 mg tablet; Take 1 tablet (10 mg) by mouth once daily.        77y/o female with HTN, HPL , CAD s/p CABG , mitral and tricuspid valve repair , atrial flutter ablation and pacemaker placement in 2019, ON Ac, est with coumadin clinic , recurrent diverticulitis , advanced DJD L spine       Hospital course, labs and imaging reports reviewed . Med reconciliation done . F/u labs/Imaging, if needed were ordered .     Pain limiting ADLS due to severe spondylosis   Rx as above   ECSAobtained today   UDS ordered     Pt has been taking Lisinopril 20mg BID instead of every day and had ran out of meds , has bene without meds for 1 week now, bp is normotensive   Given her cardiac history , will resume at lower dose 10mg ,   Pt to rto in 2m for bP check     Conditions  addressed and mgmt as noted above.  Pertinent labs, images/ imaging reports , chart review was done .   Age appropriate labs / labs for mgmt of chronic medical conditions ordered, further mgmt pending the results.

## 2024-05-03 ENCOUNTER — LAB (OUTPATIENT)
Dept: LAB | Facility: LAB | Age: 79
End: 2024-05-03
Payer: MEDICARE

## 2024-05-03 DIAGNOSIS — Z79.899 MEDICATION MANAGEMENT: ICD-10-CM

## 2024-05-03 LAB
AMPHETAMINES UR QL SCN: ABNORMAL
BARBITURATES UR QL SCN: ABNORMAL
BZE UR QL SCN: ABNORMAL
CANNABINOIDS UR QL SCN: ABNORMAL
CREAT UR-MCNC: 13.9 MG/DL (ref 20–320)
PCP UR QL SCN: ABNORMAL
SP GR UR STRIP.AUTO: 1

## 2024-05-03 PROCEDURE — 81003 URINALYSIS AUTO W/O SCOPE: CPT

## 2024-05-03 PROCEDURE — 80307 DRUG TEST PRSMV CHEM ANLYZR: CPT

## 2024-05-03 PROCEDURE — 80361 OPIATES 1 OR MORE: CPT

## 2024-05-03 PROCEDURE — 80368 SEDATIVE HYPNOTICS: CPT

## 2024-05-03 PROCEDURE — 80373 DRUG SCREENING TRAMADOL: CPT

## 2024-05-03 PROCEDURE — 80354 DRUG SCREENING FENTANYL: CPT

## 2024-05-03 PROCEDURE — 80349 CANNABINOIDS NATURAL: CPT

## 2024-05-03 PROCEDURE — 82570 ASSAY OF URINE CREATININE: CPT

## 2024-05-03 PROCEDURE — 80365 DRUG SCREENING OXYCODONE: CPT

## 2024-05-03 PROCEDURE — 80358 DRUG SCREENING METHADONE: CPT

## 2024-05-03 PROCEDURE — 80346 BENZODIAZEPINES1-12: CPT

## 2024-05-06 ENCOUNTER — APPOINTMENT (OUTPATIENT)
Dept: PRIMARY CARE | Facility: CLINIC | Age: 79
End: 2024-05-06
Payer: MEDICARE

## 2024-05-06 NOTE — PROGRESS NOTES
Neha Tsang is a 78 y.o. female with past medical history of HTN, CAD s/p CABG, mitral and tricuspid valve repair, and atrial flutter ablation and pacemaker placement 2019 on Coumadin who is referred by Radha GR for recurrent diverticulitis. She has had diverticulosis for over 20 years but states it has been worse the last 3-4 years. She states this last year she had 3 episodes of diverticulitis requiring antibiotics. Most recently she was hospitalized 4/17 to 4/19 with abdominal pain. CT showed significant sigmoid wall thickening with surrounding fat stranding and inflammatory changes. There was also a fluid collection with air-fluid level likely arising from the sigmoid wall measuring approximately 2 cm. She required IV antibiotics. There was not much for IR to put a drain in.     Since discharge she has tended more towards constipation. Currently moving bowels every 1-2 days. In April when hospitalized she had bleeding and this restarted this past week. It was after she took a laxative. Blood was only tissue when wiping. Thinks she has had an anal fissure but this went away. When she feels constipated she has abdominal pain. At times she does not eat because she is afraid of flaring her symptoms. This has been ongoing for years. She was told she has an eating disorder. She has lost some weight due to this. Some nausea she feels due to her medication, but no vomiting.    Last colonoscopy 3/2023. There was decreased sphincter tone. Stool in the entire examined colon. One 3 mm polyp in the sigmoid colon. Severe diverticulosis throughout, most extensive in the sigmoid colon. There was evidence of a few impacted diverticulum. Right colon, left colon, and rectal biopsies were normal. Small non-bleeding external and internal hemorrhoids    Family history: Denies family history of colon cancer or other GI disorders or malignancy.     Past Medical History:   Diagnosis Date    Cough, unspecified  11/12/2019    Cough    Essential (primary) hypertension 11/18/2022    Hypertension    Personal history of colonic polyps     History of colonic polyps    Personal history of other diseases of the circulatory system 08/25/2020    History of coronary artery disease    Personal history of other diseases of the musculoskeletal system and connective tissue 08/25/2020    History of osteoarthritis    Pneumonia, unspecified organism 06/01/2015    Community acquired pneumonia     Past Surgical History:   Procedure Laterality Date    TUBAL LIGATION  06/15/2015    Tubal Ligation     Current Outpatient Medications   Medication Sig Dispense Refill    acetaminophen (Tylenol) 325 mg tablet Take by mouth every 4 hours if needed.      acetaminophen-codeine (Tylenol #2) 300-15 mg tablet Take 1 tablet by mouth every 6 hours if needed for severe pain (7 - 10). 60 tablet 0    amLODIPine (Norvasc) 10 mg tablet Take 1 tablet (10 mg) by mouth once daily. 90 tablet 1    aspirin 81 mg EC tablet Take by mouth.      atorvastatin (Lipitor) 40 mg tablet Take 1 tablet (40 mg) by mouth once daily. 90 tablet 3    cholecalciferol (Vitamin D-3) 125 MCG (5000 UT) capsule Take by mouth.      ferrous sulfate 325 (65 Fe) MG tablet Take 1 tablet by mouth.      gabapentin (Neurontin) 300 mg capsule Take 1 capsule (300 mg) by mouth once daily at bedtime.      lisinopril 10 mg tablet Take 1 tablet (10 mg) by mouth once daily. 100 tablet 3    magnesium oxide (Mag-Ox) 400 mg (241.3 mg magnesium) tablet Take 1 tablet (400 mg) by mouth once daily.      metoprolol tartrate (Lopressor) 50 mg tablet Take 1 tablet by mouth every 12 hours. 180 tablet 1    sodium chloride (Ocean) 0.65 % nasal spray Administer 1 spray into affected nostril(s) if needed.      spironolactone (Aldactone) 25 mg tablet Take 1 tablet (25 mg) by mouth once daily. 90 tablet 11    warfarin (Coumadin) 3 mg tablet Take 1 tablet (3 mg) by mouth once daily in the evening. Take as directed 90 tablet  "3     No current facility-administered medications for this visit.     Allergies   Allergen Reactions    Diethyltoluamide Unknown    Pneumococcal 13-Valent Conjugate To Diphtheria Crm Hives and Other     Fever    Shellfish Containing Products Swelling     Facial swelling, throat walling    Shellfish Derived Unknown    Sulfa (Sulfonamide Antibiotics) Hives       Family History   Problem Relation Name Age of Onset    Diabetes Mother      Hypertension Father         Review of Systems  Review of Systems negative except as noted in HPI.    Objective     /76   Pulse 65   Temp 36.3 °C (97.3 °F)   Ht 1.651 m (5' 5\")   Wt 64.4 kg (142 lb)   BMI 23.63 kg/m²      Physical Exam  Constitutional:  No acute distress. Normal appearance. Not ill-appearing.  HENT:  Head normocephalic and atraumatic. Conjunctivae normal.  Cardiovascular:  Normal rate. Regular rhythm.  Pulmonary:  Pulmonary effort normal. No respiratory distress. Breath sounds clear.  Abdominal:  Abdomen is flat and soft. There is no distension. No tenderness or guarding.  Skin: Dry.  Neurological:  Alert and oriented.  Psychiatric:  Mood and affect normal.    Assessment/Plan     78 y.o. female with history of HTN, CAD s/p CABG, mitral and tricuspid valve repair, and atrial flutter ablation and pacemaker placement 2019 on Coumadin who presents today for initial clinic visit for recurrent diverticulitis. She states she typically has about 3 episodes per year. She was recently hospitalized for her most severe episode, found to have sigmoid diverticulitis with small abscess that required IV antibiotics. Strongly advise follow-up colonoscopy in 8 weeks with anesthesia due to her medical history. Given the number of recurrences she may be a candidate for elective sigmoid resection. Will refer to Colorectal for consideration.    Recommendations  See dietician.   Schedule colonoscopy. She is aware Coumadin will need to be held prior to procedure. Will reach out to " Cardiologist Dr. Carlos Valencia to ensure she has cardiac clearance.  See colorectal surgeon, Dr. Solange Colvin. Referral placed.  Follow up after colonoscopy.    Electronically signed by: Solange Jamil CNP on 5/23/2024 at 1:17 PM

## 2024-05-07 ENCOUNTER — ANTICOAGULATION - WARFARIN VISIT (OUTPATIENT)
Dept: CARDIOLOGY | Facility: CLINIC | Age: 79
End: 2024-05-07
Payer: MEDICARE

## 2024-05-07 DIAGNOSIS — Z95.2 S/P MVR (MITRAL VALVE REPLACEMENT): Primary | ICD-10-CM

## 2024-05-07 LAB
POC INR: 3.1
POC PROTHROMBIN TIME: NORMAL

## 2024-05-07 PROCEDURE — 85610 PROTHROMBIN TIME: CPT | Mod: QW

## 2024-05-07 PROCEDURE — 99211 OFF/OP EST MAY X REQ PHY/QHP: CPT

## 2024-05-07 NOTE — PROGRESS NOTES
Patient identification verified with 2 identifiers.    Location: [facility] - [address] [address] [phone] option #1     Referring Physician: DR. HERRING  Enrollment/ Re-enrollment date: 9/5/24   INR Goal: 2.0-3.0  INR monitoring is per [facility] protocol.  Anticoagulation Medication: warfarin  Indication: Mitral Valve Replacement    Subjective   Bleeding signs/symptoms: No    Bruising: No   Major bleeding event: No  Thrombosis signs/symptoms: No  Thromboembolic event: No  Missed doses: No  Extra doses: No  Medication changes: Yes  PT HAS BEEN TAKING TYLENOL WITH CODEINE FOR BACK PAIN.  TAKING ABOUT ONCE PER DAY.  Dietary changes: Yes  PT WILL GET BACK TO EATING GREEN VEGETABLES 2-3 TIMES PER WEEK.  Change in health: No  Change in activity: No  Alcohol: No  Other concerns: No    Upcoming Procedures:  Does the Patient Have any upcoming procedures that require interruption in anticoagulation therapy? no  Does the patient require bridging? no      Anticoagulation Summary  As of 5/7/2024      INR goal:  2.0-3.0   TTR:  69.3% (6.8 mo)   INR used for dosing:  3.10 (5/7/2024)   Weekly warfarin total:  19.5 mg               Assessment/Plan   Supratherapeutic     1. New dose: no change    2. Next INR: 1 week      Education provided to patient during the visit:  Patient instructed to call in interim with questions, concerns and changes.   Patient educated on interactions between medications and warfarin.   Patient educated on dietary consistency in vitamin k consumption.   Patient educated on signs of bleeding/clotting.   Patient educated on compliance with dosing, follow up appointments, and prescribed plan of care.

## 2024-05-09 ENCOUNTER — PATIENT OUTREACH (OUTPATIENT)
Dept: PRIMARY CARE | Facility: CLINIC | Age: 79
End: 2024-05-09
Payer: MEDICARE

## 2024-05-09 LAB
1OH-MIDAZOLAM UR CFM-MCNC: <25 NG/ML
6MAM UR CFM-MCNC: <25 NG/ML
7AMINOCLONAZEPAM UR CFM-MCNC: 63 NG/ML
A-OH ALPRAZ UR CFM-MCNC: <25 NG/ML
ALPRAZ UR CFM-MCNC: <25 NG/ML
CARBOXYTHC UR-MCNC: 78 NG/ML
CHLORDIAZEP UR CFM-MCNC: <25 NG/ML
CLONAZEPAM UR CFM-MCNC: <25 NG/ML
CODEINE UR CFM-MCNC: <50 NG/ML
DIAZEPAM UR CFM-MCNC: <25 NG/ML
EDDP UR CFM-MCNC: <25 NG/ML
FENTANYL UR CFM-MCNC: <2.5 NG/ML
HYDROCODONE CTO UR CFM-MCNC: <25 NG/ML
HYDROMORPHONE UR CFM-MCNC: <25 NG/ML
LORAZEPAM UR CFM-MCNC: <25 NG/ML
METHADONE UR CFM-MCNC: <25 NG/ML
MIDAZOLAM UR CFM-MCNC: <25 NG/ML
MORPHINE UR CFM-MCNC: <50 NG/ML
NORDIAZEPAM UR CFM-MCNC: <25 NG/ML
NORFENTANYL UR CFM-MCNC: <2.5 NG/ML
NORHYDROCODONE UR CFM-MCNC: <25 NG/ML
NOROXYCODONE UR CFM-MCNC: <25 NG/ML
NORTRAMADOL UR-MCNC: <50 NG/ML
OXAZEPAM UR CFM-MCNC: <25 NG/ML
OXYCODONE UR CFM-MCNC: <25 NG/ML
OXYMORPHONE UR CFM-MCNC: <25 NG/ML
TEMAZEPAM UR CFM-MCNC: <25 NG/ML
TRAMADOL UR CFM-MCNC: <50 NG/ML
ZOLPIDEM UR CFM-MCNC: <25 NG/ML
ZOLPIDEM UR-MCNC: <25 NG/ML

## 2024-05-09 NOTE — PROGRESS NOTES
Call regarding appt. with PCP on 5 - 2 -2024  after hospitalization.  At time of outreach call the patient feels as if their condition has improved.  They  deny any questions or concerns regarding  the recovery period  or follow up appointment,  Agreeable to continued outreach. They have my direct phone # and were encouraged to please reach out should they need any assistance or have any non emergent concerns prior to my next outreach.

## 2024-05-14 ENCOUNTER — ANTICOAGULATION - WARFARIN VISIT (OUTPATIENT)
Dept: CARDIOLOGY | Facility: CLINIC | Age: 79
End: 2024-05-14
Payer: MEDICARE

## 2024-05-14 DIAGNOSIS — Z95.2 S/P MVR (MITRAL VALVE REPLACEMENT): Primary | ICD-10-CM

## 2024-05-14 LAB
POC INR: 1.6
POC PROTHROMBIN TIME: NORMAL

## 2024-05-14 PROCEDURE — 85610 PROTHROMBIN TIME: CPT | Mod: QW

## 2024-05-14 NOTE — PROGRESS NOTES
Patient identification verified with 2 identifiers.    Location: [facility] - [address] [address] [phone] option #1     Referring Physician: DR. HERRING  Enrollment/ Re-enrollment date: 24   INR Goal: 2.0-3.0  INR monitoring is per [facility] protocol.  Anticoagulation Medication: warfarin  Indication: Mitral Valve Replacement    Subjective   Bleeding signs/symptoms: No    Bruising: No   Major bleeding event: No  Thrombosis signs/symptoms: No  Thromboembolic event: No  Missed doses: No  Extra doses: No  Medication changes: Yes  PT CONTINUES ON TYLENOL CODEINE EVERY OTHER DAY.  Dietary changes: Yes  PT ATE MORE GREEN VEGETABLES THIS PAST WEEK, SPECIFICALLY ATE GREENS ON .  Change in health: No  Change in activity: No  Alcohol: No  Other concerns: No    Upcoming Procedures:  Does the Patient Have any upcoming procedures that require interruption in anticoagulation therapy? no  Does the patient require bridging? no      Anticoagulation Summary  As of 2024      INR goal:  2.0-3.0   TTR:  69.2% (7.1 mo)   INR used for dosin.60 (2024)   Weekly warfarin total:  19.5 mg               Assessment/Plan   Subtherapeutic     1. New dose: no change  EXCEPT PT TO TAKE AN EXTRA HALF TABLET TONIGHT.   2. Next INR: 1 week      Education provided to patient during the visit:  Patient instructed to call in interim with questions, concerns and changes.   Patient educated on dietary consistency in vitamin k consumption.   Patient educated on signs of bleeding/clotting.

## 2024-05-21 ENCOUNTER — APPOINTMENT (OUTPATIENT)
Dept: CARDIOLOGY | Facility: CLINIC | Age: 79
End: 2024-05-21
Payer: MEDICARE

## 2024-05-21 ENCOUNTER — ANTICOAGULATION - WARFARIN VISIT (OUTPATIENT)
Dept: CARDIOLOGY | Facility: CLINIC | Age: 79
End: 2024-05-21
Payer: MEDICARE

## 2024-05-21 DIAGNOSIS — Z95.2 S/P MVR (MITRAL VALVE REPLACEMENT): Primary | ICD-10-CM

## 2024-05-23 ENCOUNTER — DOCUMENTATION (OUTPATIENT)
Dept: GASTROENTEROLOGY | Facility: CLINIC | Age: 79
End: 2024-05-23

## 2024-05-23 ENCOUNTER — OFFICE VISIT (OUTPATIENT)
Dept: GASTROENTEROLOGY | Facility: CLINIC | Age: 79
End: 2024-05-23
Payer: MEDICARE

## 2024-05-23 VITALS
TEMPERATURE: 97.3 F | HEIGHT: 65 IN | HEART RATE: 65 BPM | DIASTOLIC BLOOD PRESSURE: 76 MMHG | SYSTOLIC BLOOD PRESSURE: 139 MMHG | WEIGHT: 142 LBS | BODY MASS INDEX: 23.66 KG/M2

## 2024-05-23 DIAGNOSIS — K57.92 DIVERTICULITIS: Primary | ICD-10-CM

## 2024-05-23 DIAGNOSIS — Z86.010 HISTORY OF COLON POLYPS: ICD-10-CM

## 2024-05-23 DIAGNOSIS — R63.4 WEIGHT LOSS: ICD-10-CM

## 2024-05-23 PROCEDURE — 1159F MED LIST DOCD IN RCRD: CPT | Performed by: NURSE PRACTITIONER

## 2024-05-23 PROCEDURE — 99214 OFFICE O/P EST MOD 30 MIN: CPT | Performed by: NURSE PRACTITIONER

## 2024-05-23 PROCEDURE — 3078F DIAST BP <80 MM HG: CPT | Performed by: NURSE PRACTITIONER

## 2024-05-23 PROCEDURE — 99204 OFFICE O/P NEW MOD 45 MIN: CPT | Performed by: NURSE PRACTITIONER

## 2024-05-23 PROCEDURE — 1157F ADVNC CARE PLAN IN RCRD: CPT | Performed by: NURSE PRACTITIONER

## 2024-05-23 PROCEDURE — 3075F SYST BP GE 130 - 139MM HG: CPT | Performed by: NURSE PRACTITIONER

## 2024-05-23 PROCEDURE — 1160F RVW MEDS BY RX/DR IN RCRD: CPT | Performed by: NURSE PRACTITIONER

## 2024-05-23 RX ORDER — POLYETHYLENE GLYCOL 3350, SODIUM SULFATE ANHYDROUS, SODIUM BICARBONATE, SODIUM CHLORIDE, POTASSIUM CHLORIDE 236; 22.74; 6.74; 5.86; 2.97 G/4L; G/4L; G/4L; G/4L; G/4L
4000 POWDER, FOR SOLUTION ORAL ONCE
Qty: 4000 ML | Refills: 0 | Status: SHIPPED | OUTPATIENT
Start: 2024-05-23 | End: 2024-05-23

## 2024-05-23 NOTE — PROGRESS NOTES
Received cardiac clearance from Dr. Valencia. OK to hold Coumadin prior to colonoscopy. She will follow up with Coumadin clinic regarding this prior to procedure.

## 2024-05-23 NOTE — PATIENT INSTRUCTIONS
Recommendations  See dietician. Someone should reach out to you to schedule.  Schedule colonoscopy. You can call [phone] to schedule. I will reach out to your Cardiologist to make sure it is OK to hold your Coumadin.  See colorectal surgeon, Dr. Solange Colvin. Someone should reach out to you to schedule.  Follow up after colonoscopy. Please call the office at [phone] with any questions or concerns.

## 2024-05-24 ENCOUNTER — TELEPHONE (OUTPATIENT)
Dept: CARDIOLOGY | Facility: CLINIC | Age: 79
End: 2024-05-24

## 2024-05-30 ENCOUNTER — ANTICOAGULATION - WARFARIN VISIT (OUTPATIENT)
Dept: CARDIOLOGY | Facility: CLINIC | Age: 79
End: 2024-05-30
Payer: MEDICARE

## 2024-05-30 ENCOUNTER — PATIENT OUTREACH (OUTPATIENT)
Dept: PRIMARY CARE | Facility: CLINIC | Age: 79
End: 2024-05-30
Payer: MEDICARE

## 2024-05-30 DIAGNOSIS — Z95.2 S/P MVR (MITRAL VALVE REPLACEMENT): Primary | ICD-10-CM

## 2024-05-31 ENCOUNTER — ANTICOAGULATION - WARFARIN VISIT (OUTPATIENT)
Dept: CARDIOLOGY | Facility: CLINIC | Age: 79
End: 2024-05-31
Payer: MEDICARE

## 2024-05-31 DIAGNOSIS — Z95.2 S/P MVR (MITRAL VALVE REPLACEMENT): Primary | ICD-10-CM

## 2024-05-31 LAB
POC INR: 2.9
POC PROTHROMBIN TIME: NORMAL

## 2024-05-31 PROCEDURE — 85610 PROTHROMBIN TIME: CPT | Mod: QW

## 2024-05-31 PROCEDURE — 99211 OFF/OP EST MAY X REQ PHY/QHP: CPT

## 2024-05-31 NOTE — PROGRESS NOTES
Patient identification verified with 2 identifiers.    Location: [facility] - [address] [address] [phone] option #1     Referring Physician: Dr. Carlos Valencia  Enrollment/ Re-enrollment date: 24   INR Goal: 2.0-3.0  INR monitoring is per [facility] protocol.  Anticoagulation Medication: warfarin  Indication: Mitral Valve Replacement    Subjective   Bleeding signs/symptoms: No    Bruising: No   Major bleeding event: No  Thrombosis signs/symptoms: No  Thromboembolic event: No  Missed doses: No  Extra doses: No  Medication changes: No  Dietary changes: No  Change in health: No  Change in activity: No  Alcohol: No  Other concerns: No    Upcoming Procedures:  Does the Patient Have any upcoming procedures that require interruption in anticoagulation therapy? no  Does the patient require bridging? no      Anticoagulation Summary  As of 2024      INR goal:  2.0-3.0   TTR:  69.2% (7.6 mo)   INR used for dosin.90 (2024)   Weekly warfarin total:  19.5 mg               Assessment/Plan   Therapeutic     1. New dose: no change    2. Next INR: 2 weeks      Education provided to patient during the visit:  Patient instructed to call in interim with questions, concerns and changes.   Patient educated on interactions between medications and warfarin.   Patient educated on dietary consistency in vitamin k consumption.   Patient educated on affects of alcohol consumption while taking warfarin.   Patient educated on signs of bleeding/clotting.   Patient educated on compliance with dosing, follow up appointments, and prescribed plan of care.

## 2024-06-14 ENCOUNTER — ANTICOAGULATION - WARFARIN VISIT (OUTPATIENT)
Dept: CARDIOLOGY | Facility: CLINIC | Age: 79
End: 2024-06-14
Payer: MEDICARE

## 2024-06-14 DIAGNOSIS — Z95.2 S/P MVR (MITRAL VALVE REPLACEMENT): Primary | ICD-10-CM

## 2024-06-14 LAB
POC INR: 1.6
POC PROTHROMBIN TIME: NORMAL

## 2024-06-14 PROCEDURE — 85610 PROTHROMBIN TIME: CPT | Mod: QW

## 2024-06-21 ENCOUNTER — APPOINTMENT (OUTPATIENT)
Dept: CARDIOLOGY | Facility: CLINIC | Age: 79
End: 2024-06-21
Payer: MEDICARE

## 2024-06-21 DIAGNOSIS — Z95.2 S/P MVR (MITRAL VALVE REPLACEMENT): Primary | ICD-10-CM

## 2024-06-21 LAB
POC INR: 2.1
POC PROTHROMBIN TIME: NORMAL

## 2024-06-21 PROCEDURE — 99211 OFF/OP EST MAY X REQ PHY/QHP: CPT

## 2024-06-21 PROCEDURE — 85610 PROTHROMBIN TIME: CPT | Mod: QW

## 2024-06-21 NOTE — PROGRESS NOTES
Patient identification verified with 2 identifiers.    Location: [facility] at [facility] - [address] [address] [phone] option #1     Referring Physician: Dr. Carlos Valencia  Enrollment/ Re-enrollment date: 24   INR Goal: 2.0-3.0  INR monitoring is per [facility] protocol.  Anticoagulation Medication: warfarin  Indication: Mitral Valve Replacement    Subjective   Bleeding signs/symptoms: No    Bruising: No   Major bleeding event: No  Thrombosis signs/symptoms: No  Thromboembolic event: No  Missed doses: No  Extra doses: No  Medication changes: No  Dietary changes: No  Change in health: No  Change in activity: No  Alcohol: No  Other concerns: No    Upcoming Procedures:  Does the Patient Have any upcoming procedures that require interruption in anticoagulation therapy? no  Does the patient require bridging? no      Anticoagulation Summary  As of 2024      INR goal:  2.0-3.0   TTR:  67.8% (8.3 mo)   INR used for dosin.10 (2024)   Weekly warfarin total:  21 mg               Assessment/Plan   Therapeutic  Maintain TWD  RTC in 1 week      Education provided to patient during the visit:  Patient instructed to call in interim with questions, concerns and changes.   Patient educated on interactions between medications and warfarin.   Patient educated on dietary consistency in vitamin k consumption.   Patient educated on affects of alcohol consumption while taking warfarin.   Patient educated on signs of bleeding/clotting.   Patient educated on compliance with dosing, follow up appointments, and prescribed plan of care.        
PAIN

## 2024-06-28 ENCOUNTER — ANTICOAGULATION - WARFARIN VISIT (OUTPATIENT)
Dept: CARDIOLOGY | Facility: CLINIC | Age: 79
End: 2024-06-28
Payer: MEDICARE

## 2024-06-28 DIAGNOSIS — Z95.2 S/P MVR (MITRAL VALVE REPLACEMENT): Primary | ICD-10-CM

## 2024-06-28 LAB
POC INR: 2.1
POC PROTHROMBIN TIME: NORMAL

## 2024-06-28 PROCEDURE — 99211 OFF/OP EST MAY X REQ PHY/QHP: CPT

## 2024-06-28 PROCEDURE — 85610 PROTHROMBIN TIME: CPT | Mod: QW

## 2024-06-28 NOTE — PROGRESS NOTES
Patient identification verified with 2 identifiers.    Location: [facility] at [facility] - [address] [address] [phone] option #1     Referring Physician: Dr Carlos Valencia  Enrollment/ Re-enrollment date: 2024   INR Goal: 2.0-3.0  INR monitoring is per [facility] protocol.  Anticoagulation Medication: warfarin  Indication:  Valve    Subjective   Bleeding signs/symptoms: No    Bruising: No   Major bleeding event: No  Thrombosis signs/symptoms: No  Thromboembolic event: No  Missed doses: No  Extra doses: No  Medication changes: Yes  Patient continues on tylenol with codeine as needed with frequency of every other day. Patient has taken three doses of drarashan allergy in the last week.  Dietary changes: No  Change in health: No  Change in activity: No  Alcohol: No  Other concerns: No    Upcoming Procedures:  Does the Patient Have any upcoming procedures that require interruption in anticoagulation therapy? no  Does the patient require bridging? no      Anticoagulation Summary  As of 2024      INR goal:  2.0-3.0   TTR:  68.7% (8.6 mo)   INR used for dosin.10 (2024)   Weekly warfarin total:  21 mg               Assessment/Plan   Therapeutic     1. New dose: no change    2. Next INR: 2 weeks      Education provided to patient during the visit:  Patient instructed to call in interim with questions, concerns and changes.   Patient educated on interactions between medications and warfarin.   Patient educated on dietary consistency in vitamin k consumption.   Patient educated on affects of alcohol consumption while taking warfarin.   Patient educated on signs of bleeding/clotting.   Patient educated on compliance with dosing, follow up appointments, and prescribed plan of care.

## 2024-07-03 ENCOUNTER — HOSPITAL ENCOUNTER (OUTPATIENT)
Dept: CARDIOLOGY | Facility: CLINIC | Age: 79
Discharge: HOME | End: 2024-07-03
Payer: MEDICARE

## 2024-07-03 DIAGNOSIS — Z95.0 PRESENCE OF CARDIAC PACEMAKER: ICD-10-CM

## 2024-07-03 DIAGNOSIS — I44.2 ATRIOVENTRICULAR BLOCK, COMPLETE (MULTI): ICD-10-CM

## 2024-07-03 PROCEDURE — 93296 REM INTERROG EVL PM/IDS: CPT

## 2024-07-03 PROCEDURE — 93294 REM INTERROG EVL PM/LDLS PM: CPT | Performed by: INTERNAL MEDICINE

## 2024-07-10 ENCOUNTER — PATIENT OUTREACH (OUTPATIENT)
Dept: PRIMARY CARE | Facility: CLINIC | Age: 79
End: 2024-07-10
Payer: MEDICARE

## 2024-07-11 ENCOUNTER — APPOINTMENT (OUTPATIENT)
Dept: PRIMARY CARE | Facility: CLINIC | Age: 79
End: 2024-07-11
Payer: MEDICARE

## 2024-07-11 VITALS
HEART RATE: 87 BPM | HEIGHT: 65 IN | BODY MASS INDEX: 25.12 KG/M2 | WEIGHT: 150.8 LBS | SYSTOLIC BLOOD PRESSURE: 155 MMHG | OXYGEN SATURATION: 98 % | DIASTOLIC BLOOD PRESSURE: 77 MMHG

## 2024-07-11 DIAGNOSIS — Z95.1 S/P CABG (CORONARY ARTERY BYPASS GRAFT): ICD-10-CM

## 2024-07-11 DIAGNOSIS — F33.1 MODERATE EPISODE OF RECURRENT MAJOR DEPRESSIVE DISORDER (MULTI): Primary | ICD-10-CM

## 2024-07-11 DIAGNOSIS — Z63.8 STRESS DUE TO FAMILY TENSION: ICD-10-CM

## 2024-07-11 PROCEDURE — 1036F TOBACCO NON-USER: CPT | Performed by: STUDENT IN AN ORGANIZED HEALTH CARE EDUCATION/TRAINING PROGRAM

## 2024-07-11 PROCEDURE — 3077F SYST BP >= 140 MM HG: CPT | Performed by: STUDENT IN AN ORGANIZED HEALTH CARE EDUCATION/TRAINING PROGRAM

## 2024-07-11 PROCEDURE — 3078F DIAST BP <80 MM HG: CPT | Performed by: STUDENT IN AN ORGANIZED HEALTH CARE EDUCATION/TRAINING PROGRAM

## 2024-07-11 PROCEDURE — 1159F MED LIST DOCD IN RCRD: CPT | Performed by: STUDENT IN AN ORGANIZED HEALTH CARE EDUCATION/TRAINING PROGRAM

## 2024-07-11 PROCEDURE — 1157F ADVNC CARE PLAN IN RCRD: CPT | Performed by: STUDENT IN AN ORGANIZED HEALTH CARE EDUCATION/TRAINING PROGRAM

## 2024-07-11 PROCEDURE — 99214 OFFICE O/P EST MOD 30 MIN: CPT | Performed by: STUDENT IN AN ORGANIZED HEALTH CARE EDUCATION/TRAINING PROGRAM

## 2024-07-11 PROCEDURE — 1160F RVW MEDS BY RX/DR IN RCRD: CPT | Performed by: STUDENT IN AN ORGANIZED HEALTH CARE EDUCATION/TRAINING PROGRAM

## 2024-07-11 RX ORDER — LISINOPRIL 20 MG/1
20 TABLET ORAL DAILY
Qty: 100 TABLET | Refills: 3 | Status: SHIPPED | OUTPATIENT
Start: 2024-07-11 | End: 2025-08-15

## 2024-07-11 RX ORDER — OLOPATADINE HYDROCHLORIDE 1 MG/ML
SOLUTION/ DROPS OPHTHALMIC
COMMUNITY
Start: 2024-06-14

## 2024-07-11 RX ORDER — SERTRALINE HYDROCHLORIDE 25 MG/1
25 TABLET, FILM COATED ORAL DAILY
Qty: 50 TABLET | Refills: 2 | Status: SHIPPED | OUTPATIENT
Start: 2024-07-11 | End: 2024-10-09

## 2024-07-11 SDOH — SOCIAL STABILITY - SOCIAL INSECURITY: OTHER SPECIFIED PROBLEMS RELATED TO PRIMARY SUPPORT GROUP: Z63.8

## 2024-07-11 NOTE — PROGRESS NOTES
"Subjective   Patient ID: Neha Tsang is a 78 y.o. female who presents for Hypertension (2 month follow-up. ).        HPI    77y/o female with HTN, HPL , CAD s/p CABG , mitral and tricuspid valve repair , atrial flutter ablation and pacemaker placement in 2019, ON Ac, est with coumadin clinic , recurrent diverticulitis , advanced DJD L spine      At the last visit in May , Lisinopril 10mg every day was advised  (she was taking 20mg BID and had ran out of meds)    She is very tearful today , reports feeling depressed X 1 year   Worked on her living will and \" gave away all her stuff \"   She reports being exhausted   Weight stable   Also in pain   Was seeing a therapist  prior to the pandemic   She reports being disappointed in her children .    in 2022 August , he was 10 years younger and she was eligible for medicare   This is also the month her sisters had passed and she is more emotional     \" I just want to go to sleep \"  \" I want to put it in God's hands\"  she states as she sobs   Also in financial stress , bought a family home with her other daughter  ,who now is not contributing to the mortgage           Visit Vitals  /77   Pulse 87   Ht 1.651 m (5' 5\")   Wt 68.4 kg (150 lb 12.8 oz)   SpO2 98%   BMI 25.09 kg/m²   Smoking Status Former   BSA 1.77 m²      No LMP recorded.   Current Outpatient Medications   Medication Instructions    acetaminophen (Tylenol) 325 mg tablet oral, Every 4 hours PRN    amLODIPine (NORVASC) 10 mg, oral, Daily    aspirin 81 mg EC tablet oral    atorvastatin (LIPITOR) 40 mg, oral, Daily    cholecalciferol (Vitamin D-3) 125 MCG (5000 UT) capsule oral    ferrous sulfate 325 (65 Fe) MG tablet 65 mg of iron, oral    gabapentin (NEURONTIN) 300 mg, oral, Nightly    lisinopril 20 mg, oral, Daily    magnesium oxide (Mag-Ox) 400 mg (241.3 mg magnesium) tablet 1 tablet, oral, Daily    metoprolol tartrate (LOPRESSOR) 50 mg, oral, Every 12 hours    olopatadine (Patanol) 0.1 % " ophthalmic solution ophthalmic (eye)    sertraline (ZOLOFT) 25 mg, oral, Daily, After 1 week , take 2 pills everyday    sodium chloride (Ocean) 0.65 % nasal spray 1 spray, nasal, As needed    spironolactone (ALDACTONE) 25 mg, oral, Daily    warfarin (COUMADIN) 3 mg, oral, Every evening, Take as directed      Social History     Tobacco Use    Smoking status: Former     Current packs/day: 0.00     Types: Cigarettes     Quit date:      Years since quittin.5    Smokeless tobacco: Never   Substance Use Topics    Alcohol use: Not Currently        Review of Systems    Constitutional : No feeling poorly / fevers/ chills / night sweats/ fatigue   Cardiovascular : No CP /Palpitations/ lower extremity edema / syncope   Respiratory : No Cough /LARA/Dyspnea at rest   Gastrointestinal : No abd pain / N/V  No bloody stools/ melena / constipation  Endo : No polyuria/polydipsia/ muscle weakness / sluggishness   CNS: No confusion / HA/ tingling/ numbness/ weakness of extremities  Psychiatric: No anxiety/ depression/ SI/HI    All other systems have been reviewed and are negative for complaint       Physical Exam    Constitutional : Vitals reviewed. Alert and in no distress  Cardiovascular : RRR, Normal S1, S2, No pericardial rub/ gallop, no peripheral edema   Pulmonary: No respiratory distress, CTAB   MSK : Normal gait and station , strength and tone   Skin: Warm to touch ,  normal skin turgor   Neurologic : CNs 2-12 grossly intact , no obvious FNDs  Psych : A,Ox3, normal mood and affect      Assessment/Plan   Diagnoses and all orders for this visit:  Moderate episode of recurrent major depressive disorder (Multi)  -     sertraline (Zoloft) 25 mg tablet; Take 1 tablet (25 mg) by mouth once daily. After 1 week , take 2 pills everyday  -     Referral to Psychology; Future  S/P CABG (coronary artery bypass graft)  -     lisinopril 20 mg tablet; Take 1 tablet (20 mg) by mouth once daily.        79y/o female with HTN, HPL , CAD s/p  CABG , mitral and tricuspid valve repair , atrial flutter ablation and pacemaker placement in 2019, ON Ac, est with coumadin clinic , recurrent diverticulitis , advanced DJD L spine       Start zoloft as above   Motivational counseling and empathized with her   Increase Lisinopril to 20mg   Reach out to  For Prep rx for colonoscopy     RTO in 2m           Conditions addressed and mgmt as noted above.  Pertinent labs, images/ imaging reports , chart review was done .   Age appropriate labs / labs for mgmt of chronic medical conditions ordered, further mgmt pending the results.

## 2024-07-12 ENCOUNTER — ANTICOAGULATION - WARFARIN VISIT (OUTPATIENT)
Dept: CARDIOLOGY | Facility: CLINIC | Age: 79
End: 2024-07-12
Payer: MEDICARE

## 2024-07-12 DIAGNOSIS — Z95.2 S/P MVR (MITRAL VALVE REPLACEMENT): Primary | ICD-10-CM

## 2024-07-12 LAB
POC INR: 2.2
POC PROTHROMBIN TIME: NORMAL

## 2024-07-12 PROCEDURE — 85610 PROTHROMBIN TIME: CPT | Mod: QW

## 2024-07-12 PROCEDURE — 99211 OFF/OP EST MAY X REQ PHY/QHP: CPT

## 2024-07-12 NOTE — PROGRESS NOTES
Patient identification verified with 2 identifiers.    Location: [facility] - [address] [address] [phone] option #1     Referring Physician: Dr Carlos Valencia  Enrollment/ Re-enrollment date: 2024   INR Goal: 2.0-3.0  INR monitoring is per [facility] protocol.  Anticoagulation Medication: warfarin  Indication:  Valve    Subjective   Bleeding signs/symptoms: No    Bruising: No   Major bleeding event: No  Thrombosis signs/symptoms: No  Thromboembolic event: No  Missed doses: No  Extra doses: No  Medication changes: Yes  will be starting Zoloft  Dietary changes: No  Change in health: No  Change in activity: No  Alcohol: No  Other concerns: No    Upcoming Procedures:  Does the Patient Have any upcoming procedures that require interruption in anticoagulation therapy? no  Does the patient require bridging? no      Anticoagulation Summary  As of 2024      INR goal:  2.0-3.0   TTR:  70.3% (9 mo)   INR used for dosin.20 (2024)   Weekly warfarin total:  21 mg               Assessment/Plan   Therapeutic     1. New dose: no change    2. Next INR: 2 weeks d/t starting new medication      Education provided to patient during the visit:  Patient instructed to call in interim with questions, concerns and changes.   Patient educated on interactions between medications and warfarin.   Patient educated on dietary consistency in vitamin k consumption.   Patient educated on affects of alcohol consumption while taking warfarin.   Patient educated on signs of bleeding/clotting.   Patient educated on compliance with dosing, follow up appointments, and prescribed plan of care.

## 2024-07-22 ENCOUNTER — LAB REQUISITION (OUTPATIENT)
Dept: LAB | Facility: HOSPITAL | Age: 79
End: 2024-07-22
Payer: MEDICARE

## 2024-07-22 ENCOUNTER — TELEPHONE (OUTPATIENT)
Dept: PRIMARY CARE | Facility: CLINIC | Age: 79
End: 2024-07-22

## 2024-07-22 DIAGNOSIS — N39.0 URINARY TRACT INFECTION, SITE NOT SPECIFIED: ICD-10-CM

## 2024-07-22 DIAGNOSIS — R30.0 DYSURIA: ICD-10-CM

## 2024-07-22 PROCEDURE — 87086 URINE CULTURE/COLONY COUNT: CPT

## 2024-07-23 ENCOUNTER — HOSPITAL ENCOUNTER (OUTPATIENT)
Dept: CARDIOLOGY | Facility: CLINIC | Age: 79
Discharge: HOME | End: 2024-07-23
Payer: MEDICARE

## 2024-07-23 DIAGNOSIS — Z95.0 PRESENCE OF CARDIAC PACEMAKER: ICD-10-CM

## 2024-07-23 DIAGNOSIS — I44.2 ATRIOVENTRICULAR BLOCK, COMPLETE (MULTI): ICD-10-CM

## 2024-07-24 LAB — BACTERIA UR CULT: NORMAL

## 2024-07-26 ENCOUNTER — ANTICOAGULATION - WARFARIN VISIT (OUTPATIENT)
Dept: CARDIOLOGY | Facility: CLINIC | Age: 79
End: 2024-07-26
Payer: MEDICARE

## 2024-07-26 DIAGNOSIS — Z95.2 S/P MVR (MITRAL VALVE REPLACEMENT): Primary | ICD-10-CM

## 2024-07-26 LAB
POC INR: 3.8
POC PROTHROMBIN TIME: NORMAL

## 2024-07-26 PROCEDURE — 99211 OFF/OP EST MAY X REQ PHY/QHP: CPT

## 2024-07-26 PROCEDURE — 85610 PROTHROMBIN TIME: CPT | Mod: QW

## 2024-07-26 NOTE — PROGRESS NOTES
Patient identification verified with 2 identifiers.    Location: [facility] at [facility] - [address] [address] [phone] option #1     Referring Physician: Dr Carlos Valencia  Enrollment/ Re-enrollment date: 9/5/2024   INR Goal: 2.0-3.0  INR monitoring is per [facility] protocol.  Anticoagulation Medication: warfarin  Indication:  Valve    Subjective   Bleeding signs/symptoms: No    Bruising: No   Major bleeding event: No  Thrombosis signs/symptoms: No  Thromboembolic event: No  Missed doses: No  Extra doses: No  Medication changes: Yes  has 2 more days on antibiotic  Dietary changes: No  Change in health: No  Change in activity: No  Alcohol: No  Other concerns: No    Upcoming Procedures:  Does the Patient Have any upcoming procedures that require interruption in anticoagulation therapy? no  Does the patient require bridging? no      Anticoagulation Summary  As of 7/26/2024      INR goal:  2.0-3.0   TTR:  69.3% (9.5 mo)   INR used for dosing:  3.80 (7/26/2024)   Weekly warfarin total:  21 mg               Assessment/Plan   Supra therapeutic in setting of antibiotics. Patient has 2 more days left.  Will HOLD today's dose then maintain weekly dose.  RTC in 1 week.  Education provided to patient during the visit:  Patient instructed to call in interim with questions, concerns and changes.   Patient educated on interactions between medications and warfarin.   Patient educated on dietary consistency in vitamin k consumption.   Patient educated on affects of alcohol consumption while taking warfarin.   Patient educated on signs of bleeding/clotting.   Patient educated on compliance with dosing, follow up appointments, and prescribed plan of care.        
Abdominal Pain, N/V/D

## 2024-08-02 ENCOUNTER — ANTICOAGULATION - WARFARIN VISIT (OUTPATIENT)
Dept: CARDIOLOGY | Facility: CLINIC | Age: 79
End: 2024-08-02
Payer: MEDICARE

## 2024-08-02 DIAGNOSIS — Z95.2 S/P MVR (MITRAL VALVE REPLACEMENT): Primary | ICD-10-CM

## 2024-08-02 LAB
POC INR: 1.8
POC PROTHROMBIN TIME: NORMAL

## 2024-08-02 PROCEDURE — 85610 PROTHROMBIN TIME: CPT | Mod: QW

## 2024-08-02 PROCEDURE — 99211 OFF/OP EST MAY X REQ PHY/QHP: CPT

## 2024-08-02 NOTE — PROGRESS NOTES
Patient identification verified with 2 identifiers.    Location: [facility] at [facility] - [address] [address] [phone] option #1     Referring Physician: Dr Carlos Valencia  Enrollment/ Re-enrollment date: 2024   INR Goal: 2.0-3.0  INR monitoring is per [facility] protocol.  Anticoagulation Medication: warfarin  Indication:  Valve    Subjective   Bleeding signs/symptoms:      Bruising:     Major bleeding event:    Thrombosis signs/symptoms:    Thromboembolic event:    Missed doses:    Extra doses:    Medication changes:    Dietary changes:    Change in health:    Change in activity:    Alcohol:    Other concerns:      Upcoming Procedures:  Does the Patient Have any upcoming procedures that require interruption in anticoagulation therapy? no  Does the patient require bridging? no      Anticoagulation Summary  As of 2024      INR goal:  2.0-3.0   TTR:  68.9% (9.7 mo)   INR used for dosin.80 (2024)   Weekly warfarin total:  21 mg               Assessment/Plan   SUB THERAPEUTIC IN SETTING OF 1 MISSED DOSE IN ADDITION TO HELD DOSE LAST WEEK  MAINTAIN DOSE  RTC IN 1 WEEK  Education provided to patient during the visit:  Patient instructed to call in interim with questions, concerns and changes.   Patient educated on interactions between medications and warfarin.   Patient educated on dietary consistency in vitamin k consumption.   Patient educated on affects of alcohol consumption while taking warfarin.   Patient educated on signs of bleeding/clotting.   Patient educated on compliance with dosing, follow up appointments, and prescribed plan of care.

## 2024-08-05 ENCOUNTER — TELEPHONE (OUTPATIENT)
Dept: CARDIOLOGY | Facility: HOSPITAL | Age: 79
End: 2024-08-05
Payer: MEDICARE

## 2024-08-05 ENCOUNTER — HOSPITAL ENCOUNTER (OUTPATIENT)
Dept: CARDIOLOGY | Facility: CLINIC | Age: 79
Discharge: HOME | End: 2024-08-05
Payer: MEDICARE

## 2024-08-05 DIAGNOSIS — Z95.0 PRESENCE OF CARDIAC PACEMAKER: ICD-10-CM

## 2024-08-05 DIAGNOSIS — I44.2 ATRIOVENTRICULAR BLOCK, COMPLETE (MULTI): ICD-10-CM

## 2024-08-06 NOTE — TELEPHONE ENCOUNTER
Phoned patient and spoke to patient. Provided patient plan of care per Dr. Valencia notation and patient verbalized understanding.

## 2024-08-09 ENCOUNTER — ANTICOAGULATION - WARFARIN VISIT (OUTPATIENT)
Dept: CARDIOLOGY | Facility: HOSPITAL | Age: 79
End: 2024-08-09
Payer: MEDICARE

## 2024-08-09 DIAGNOSIS — Z95.2 S/P MVR (MITRAL VALVE REPLACEMENT): Primary | ICD-10-CM

## 2024-08-09 LAB
POC INR: 3.3
POC PROTHROMBIN TIME: NORMAL

## 2024-08-09 PROCEDURE — 99211 OFF/OP EST MAY X REQ PHY/QHP: CPT

## 2024-08-09 PROCEDURE — 85610 PROTHROMBIN TIME: CPT | Mod: QW

## 2024-08-09 NOTE — PROGRESS NOTES
Patient identification verified with 2 identifiers.    Location: [facility] [address] [address] [phone] option #2     Referring Physician: Dr. Carlos Valencia   Enrollment/ Re-enrollment date: 9/5/2024 Sent renewal today 8/9  INR Goal: 2.0-3.0  INR monitoring is per [facility] protocol.  Anticoagulation Medication: warfarin 3 mg tablets   Indication: Mitral Valve Replacement    Subjective   Bleeding signs/symptoms: No    Bruising: No   Major bleeding event: No  Thrombosis signs/symptoms: No  Thromboembolic event: No  Missed doses: No  Extra doses: No  Medication changes: No  Dietary changes: Yes  Change in health: No  Change in activity: No  Alcohol: No  Other concerns: No    Upcoming Procedures:  Does the Patient Have any upcoming procedures that require interruption in anticoagulation therapy? Colonoscopy on 8/21, patient states she has been instructed to stop warfarin 5 days prior to procedure (last warfarin dose 8/15)  Does the patient require bridging? Unknown at this time       Anticoagulation Summary  As of 8/9/2024      INR goal:  2.0-3.0   TTR:  68.8% (10 mo)   INR used for dosing:  3.30 (8/9/2024)   Weekly warfarin total:  21 mg               Assessment/Plan   Supratherapeutic     1. New dose: no change    2. Next INR:  5 days       Education provided to patient during the visit:  Patient instructed to call in interim with questions, concerns and changes.   Patient educated on interactions between medications and warfarin.   Patient educated on dietary consistency in vitamin k consumption.   Patient educated on signs of bleeding/clotting.   Patient educated on compliance with dosing, follow up appointments, and prescribed plan of care.

## 2024-08-12 ENCOUNTER — TELEPHONE (OUTPATIENT)
Dept: CARDIOLOGY | Facility: HOSPITAL | Age: 79
End: 2024-08-12
Payer: MEDICARE

## 2024-08-12 ENCOUNTER — HOSPITAL ENCOUNTER (OUTPATIENT)
Dept: CARDIOLOGY | Facility: CLINIC | Age: 79
Discharge: HOME | End: 2024-08-12
Payer: MEDICARE

## 2024-08-12 DIAGNOSIS — Z95.0 PRESENCE OF CARDIAC PACEMAKER: ICD-10-CM

## 2024-08-12 DIAGNOSIS — I44.2 ATRIOVENTRICULAR BLOCK, COMPLETE (MULTI): ICD-10-CM

## 2024-08-14 ENCOUNTER — APPOINTMENT (OUTPATIENT)
Dept: CARDIOLOGY | Facility: CLINIC | Age: 79
End: 2024-08-14
Payer: MEDICARE

## 2024-08-14 ENCOUNTER — ANTICOAGULATION - WARFARIN VISIT (OUTPATIENT)
Dept: CARDIOLOGY | Facility: CLINIC | Age: 79
End: 2024-08-14
Payer: MEDICARE

## 2024-08-14 ENCOUNTER — TELEPHONE (OUTPATIENT)
Dept: CARDIOLOGY | Facility: HOSPITAL | Age: 79
End: 2024-08-14
Payer: MEDICARE

## 2024-08-14 ENCOUNTER — ANTICOAGULATION - WARFARIN VISIT (OUTPATIENT)
Dept: CARDIOLOGY | Facility: HOSPITAL | Age: 79
End: 2024-08-14
Payer: MEDICARE

## 2024-08-14 ENCOUNTER — APPOINTMENT (OUTPATIENT)
Dept: CARDIOLOGY | Facility: HOSPITAL | Age: 79
End: 2024-08-14
Payer: MEDICARE

## 2024-08-14 DIAGNOSIS — Z95.2 S/P MVR (MITRAL VALVE REPLACEMENT): Primary | ICD-10-CM

## 2024-08-14 NOTE — PROGRESS NOTES
DOMINIC Tsang is a 78 y.o. female with a 20 year history of diverticulitis who was hospitalized 4/2024 for abdominal pain. CT showed significant sigmoid wall thickening with surrounding fat stranding and inflammatory changes. There was also a fluid collection with air-fluid level likely arising from the sigmoid wall measuring approximately 2 cm. She was treated with IV antibiotics. She typically has 3 episodes of diverticulitis per year. She has had 3 hospital admissions for diverticulitis.     Her symptoms when she has an attack are LLQ pain, nausea and vomiting. Moves her bowels every other day. Some urgency with diarrhea but no incontinence.     CT c/a/p 4/17/24: CHEST: No acute abnormalities within the chest. Heavy aortic and coronary artery calcification.    ABDOMEN-PELVIS: Significant wall thickening of a short segment of the sigmoid colon with diverticulosis and question of wall abscess. Findings may be due to recurrent severe acute diverticulitis. Given the degree of wall thickening can not exclude neoplasm and clinical correlation and follow-up is recommended.    ECHO 4/2021:    1. The left ventricular systolic function is normal with a 65% estimated ejection fraction.   2. Abnormal septal motion consistent with post-operative status.   3. Spectral Doppler shows an abnormal pattern of left ventricular diastolic filling.   4. The tricuspid valve is abnormal.   5. Mildly elevated RVSP.   6. The left ventricular cavity size is decreased.    Colonoscopy 8/21/24 ([facility]):   2 subcentimeter polyps were removed with cold snare  Extensive diverticulosis of moderate severity in the descending colon and sigmoid colon  Moderate edematous, erythematous mucosa in the sigmoid colon; performed cold forceps biopsy  Medium hemorrhoids    Non-smoker/No ETOH/No Illicit drug use  PMH: HTN, HLD, CAD s/p CABG , mitral and tricuspid valve repair, atrial flutter ablation and pacemaker placement in 2019, on  coumadin, advanced DJD L spine  PSH:  No family history of CRC or IBD    Past Medical History:   Diagnosis Date    Cough, unspecified 11/12/2019    Cough    Essential (primary) hypertension 11/18/2022    Hypertension    Personal history of colonic polyps     History of colonic polyps    Personal history of other diseases of the circulatory system 08/25/2020    History of coronary artery disease    Personal history of other diseases of the musculoskeletal system and connective tissue 08/25/2020    History of osteoarthritis    Pneumonia, unspecified organism 06/01/2015    Community acquired pneumonia       Past Surgical History:   Procedure Laterality Date    TUBAL LIGATION  06/15/2015    Tubal Ligation       Allergies   Allergen Reactions    Diethyltoluamide Unknown    Pneumococcal 13-Valent Conjugate To Diphtheria Crm Hives and Other     Fever    Shellfish Containing Products Swelling     Facial swelling, throat walling    Shellfish Derived Unknown    Sulfa (Sulfonamide Antibiotics) Hives       Review of Systems   All other systems reviewed and are negative.      Physical Exam  Vitals reviewed. Exam conducted with a chaperone present.   Constitutional:       Appearance: Normal appearance. She is normal weight.   HENT:      Head: Normocephalic.   Pulmonary:      Effort: Pulmonary effort is normal.   Abdominal:      General: Abdomen is flat. There is no distension.      Palpations: Abdomen is soft. There is no mass.      Tenderness: There is abdominal tenderness (RLQ).      Hernia: No hernia is present.   Skin:     General: Skin is warm and dry.   Neurological:      Mental Status: She is alert.         Assessment and Plan:   Recurrent diverticulitis - one episode of Hinchey 1, otherwise hospitalized but uncomplicated.     We discussed the diagnosis of diverticular disease, anatomy, pathophysiology, indications for surgery, treatment goals and alternatives, risks and benefits in detail.     We discussed the incidence  of recurrence, preoperative, perioperative and postoperative course, preoperative preparation and possibility of conversion to an open procedure, and ileus in detail. Risks of the procedure discussed included, but were not limited to, infection (abdomen, wound, bladder, lungs, etc), bleeding, splenic injury, transfusion (hepatitis, HIV), ureter injury and need for other treatment or surgery, anastomotic leak, stenosis, ischemia/poor blood supply, poor bowel function, possibility of an ostomy, bladder problems (incontinence, retention), deep vein thrombosis-DVT (leg clots), pulmonary embolism-PE (lung clots), hernia, adhesions/small bowel obstruction, heart problems (heart attack, CHF &amp; arrhythmia), lung problems (pneumonia, aspiration, atelectasis), stroke, and death. We also discussed that if there is an overlap with functional abdominal pain, surgery will not address that issue.     We discussed the procedure of sigmoid colon resection, and colorectal anastomosis. We discussed the laparoscopic and and open approaches. The possibility of a stoma was discussed.     We reviewed how medical co-morbidities impact perioperative/operative risk and postoperative course.    If she wants to proceed with elective surgery, next step is to see her cardiologist for risk stratification and optimization. Last echo was 2021 that I can see.     She and her daughter will think about it and get back to us.

## 2024-08-15 ENCOUNTER — APPOINTMENT (OUTPATIENT)
Dept: SURGERY | Facility: CLINIC | Age: 79
End: 2024-08-15
Payer: MEDICARE

## 2024-08-15 ENCOUNTER — HOSPITAL ENCOUNTER (OUTPATIENT)
Dept: CARDIOLOGY | Facility: CLINIC | Age: 79
Discharge: HOME | End: 2024-08-15
Payer: MEDICARE

## 2024-08-15 DIAGNOSIS — I44.2 CHB (COMPLETE HEART BLOCK) (MULTI): ICD-10-CM

## 2024-08-19 NOTE — H&P
History Of Present Illness  Neha Tsang is a 78 y.o. female presenting with recurrent diverticulitis.     Past Medical History  Past Medical History:   Diagnosis Date    Cough, unspecified 11/12/2019    Cough    Essential (primary) hypertension 11/18/2022    Hypertension    Personal history of colonic polyps     History of colonic polyps    Personal history of other diseases of the circulatory system 08/25/2020    History of coronary artery disease    Personal history of other diseases of the musculoskeletal system and connective tissue 08/25/2020    History of osteoarthritis    Pneumonia, unspecified organism 06/01/2015    Community acquired pneumonia     Surgical History  Past Surgical History:   Procedure Laterality Date    PACEMAKER PLACEMENT      TUBAL LIGATION  06/15/2015    Tubal Ligation     Social History  She reports that she quit smoking about 41 years ago. Her smoking use included cigarettes. She has never used smokeless tobacco. She reports that she does not currently use alcohol. She reports current drug use. Drug: Marijuana.    Family History  Family History   Problem Relation Name Age of Onset    Diabetes Mother      Hypertension Father          Allergies  Allergies   Allergen Reactions    Diethyltoluamide Unknown    Pneumococcal 13-Valent Conjugate To Diphtheria Crm Hives and Other     Fever    Shellfish Containing Products Swelling     Facial swelling, throat walling    Shellfish Derived Unknown    Sulfa (Sulfonamide Antibiotics) Hives     Review of Systems     Physical Exam  Vitals and nursing note reviewed.   Constitutional:       Appearance: Normal appearance.   Cardiovascular:      Rate and Rhythm: Normal rate and regular rhythm.      Pulses: Normal pulses.      Heart sounds: Normal heart sounds.   Pulmonary:      Effort: Pulmonary effort is normal.      Breath sounds: Normal breath sounds.   Abdominal:      General: Abdomen is flat.      Palpations: Abdomen is soft.   Neurological:       Mental Status: She is alert.          Last Recorded Vitals  There were no vitals taken for this visit.    Assessment/Plan   Recurrent Diveritculitis    Proceed with colonoscopy     Dick Hinkle MD

## 2024-08-21 ENCOUNTER — HOSPITAL ENCOUNTER (OUTPATIENT)
Dept: GASTROENTEROLOGY | Facility: HOSPITAL | Age: 79
Discharge: HOME | End: 2024-08-21
Payer: MEDICARE

## 2024-08-21 ENCOUNTER — ANESTHESIA (OUTPATIENT)
Dept: GASTROENTEROLOGY | Facility: HOSPITAL | Age: 79
End: 2024-08-21
Payer: MEDICARE

## 2024-08-21 ENCOUNTER — ANESTHESIA EVENT (OUTPATIENT)
Dept: GASTROENTEROLOGY | Facility: HOSPITAL | Age: 79
End: 2024-08-21
Payer: MEDICARE

## 2024-08-21 VITALS
RESPIRATION RATE: 19 BRPM | TEMPERATURE: 96.8 F | SYSTOLIC BLOOD PRESSURE: 121 MMHG | DIASTOLIC BLOOD PRESSURE: 41 MMHG | HEART RATE: 60 BPM | BODY MASS INDEX: 23.8 KG/M2 | HEIGHT: 65 IN | WEIGHT: 142.86 LBS | OXYGEN SATURATION: 99 %

## 2024-08-21 DIAGNOSIS — Z86.010 HISTORY OF COLON POLYPS: ICD-10-CM

## 2024-08-21 DIAGNOSIS — K57.92 DIVERTICULITIS: ICD-10-CM

## 2024-08-21 PROCEDURE — 7100000009 HC PHASE TWO TIME - INITIAL BASE CHARGE

## 2024-08-21 PROCEDURE — 88305 TISSUE EXAM BY PATHOLOGIST: CPT | Mod: TC,AHULAB | Performed by: INTERNAL MEDICINE

## 2024-08-21 PROCEDURE — 2500000005 HC RX 250 GENERAL PHARMACY W/O HCPCS

## 2024-08-21 PROCEDURE — 45385 COLONOSCOPY W/LESION REMOVAL: CPT | Performed by: INTERNAL MEDICINE

## 2024-08-21 PROCEDURE — 2500000004 HC RX 250 GENERAL PHARMACY W/ HCPCS (ALT 636 FOR OP/ED)

## 2024-08-21 PROCEDURE — 45380 COLONOSCOPY AND BIOPSY: CPT | Performed by: INTERNAL MEDICINE

## 2024-08-21 PROCEDURE — 2500000004 HC RX 250 GENERAL PHARMACY W/ HCPCS (ALT 636 FOR OP/ED): Performed by: INTERNAL MEDICINE

## 2024-08-21 PROCEDURE — 3700000001 HC GENERAL ANESTHESIA TIME - INITIAL BASE CHARGE

## 2024-08-21 PROCEDURE — 3700000002 HC GENERAL ANESTHESIA TIME - EACH INCREMENTAL 1 MINUTE

## 2024-08-21 PROCEDURE — 7100000010 HC PHASE TWO TIME - EACH INCREMENTAL 1 MINUTE

## 2024-08-21 RX ORDER — PROPOFOL 10 MG/ML
INJECTION, EMULSION INTRAVENOUS AS NEEDED
Status: DISCONTINUED | OUTPATIENT
Start: 2024-08-21 | End: 2024-08-21

## 2024-08-21 RX ORDER — LIDOCAINE HYDROCHLORIDE 20 MG/ML
INJECTION, SOLUTION EPIDURAL; INFILTRATION; INTRACAUDAL; PERINEURAL AS NEEDED
Status: DISCONTINUED | OUTPATIENT
Start: 2024-08-21 | End: 2024-08-21

## 2024-08-21 RX ORDER — SODIUM CHLORIDE, SODIUM LACTATE, POTASSIUM CHLORIDE, CALCIUM CHLORIDE 600; 310; 30; 20 MG/100ML; MG/100ML; MG/100ML; MG/100ML
20 INJECTION, SOLUTION INTRAVENOUS CONTINUOUS
Status: DISCONTINUED | OUTPATIENT
Start: 2024-08-21 | End: 2024-08-22 | Stop reason: HOSPADM

## 2024-08-21 ASSESSMENT — PAIN - FUNCTIONAL ASSESSMENT
PAIN_FUNCTIONAL_ASSESSMENT: 0-10

## 2024-08-21 ASSESSMENT — COLUMBIA-SUICIDE SEVERITY RATING SCALE - C-SSRS
6. HAVE YOU EVER DONE ANYTHING, STARTED TO DO ANYTHING, OR PREPARED TO DO ANYTHING TO END YOUR LIFE?: NO
1. IN THE PAST MONTH, HAVE YOU WISHED YOU WERE DEAD OR WISHED YOU COULD GO TO SLEEP AND NOT WAKE UP?: NO
2. HAVE YOU ACTUALLY HAD ANY THOUGHTS OF KILLING YOURSELF?: NO

## 2024-08-21 ASSESSMENT — PAIN SCALES - GENERAL
PAINLEVEL_OUTOF10: 0 - NO PAIN

## 2024-08-21 NOTE — ANESTHESIA POSTPROCEDURE EVALUATION
Patient: Neha Sheehan Day    Procedure Summary       Date: 08/21/24 Room / Location: [facility]    Anesthesia Start: 0927 Anesthesia Stop: 0952    Procedure: COLONOSCOPY Diagnosis:       Diverticulitis      History of colon polyps    Scheduled Providers: Dick Hinkle MD; Sacha Martino MD; LATESHA Conde-CRNA Responsible Provider: Sacha Martino MD    Anesthesia Type: MAC ASA Status: 3            Anesthesia Type: MAC    Vitals Value Taken Time   /41 08/21/24 1025   Temp 36 °C (96.8 °F) 08/21/24 1025   Pulse 60 08/21/24 1025   Resp 19 08/21/24 1025   SpO2 99 % 08/21/24 1025       Anesthesia Post Evaluation    Patient location during evaluation: PACU  Patient participation: complete - patient participated  Level of consciousness: awake and alert  Pain management: adequate  Airway patency: patent  Cardiovascular status: acceptable and hemodynamically stable  Respiratory status: acceptable, spontaneous ventilation and nonlabored ventilation  Hydration status: acceptable  Postoperative Nausea and Vomiting: none        There were no known notable events for this encounter.

## 2024-08-21 NOTE — ANESTHESIA PREPROCEDURE EVALUATION
Patient: Neha Sheehan Day    Procedure Information       Date/Time: 08/21/24 0910    Scheduled providers: Dick Hinkle MD; Sacha Martino MD; DEON Conde    Procedure: COLONOSCOPY    Location: [facility]            Relevant Problems   Cardiac   (+) CAD (coronary artery disease)   (+) Complete heart block (Multi)   (+) Hyperlipidemia   (+) Hypertension   (+) Mitral regurgitation   (+) Pacemaker   (+) Tricuspid regurgitation   (+) Typical atrial flutter (Multi)      Pulmonary   (+) Community acquired pneumonia      Neuro   (+) Anxiety disorder      Hematology   (+) Coagulation defect, unspecified (Multi)      Musculoskeletal   (+) Chronic low back pain   (+) Glenohumeral arthritis, right   (+) Osteoarthritis, chronic       Clinical information reviewed:    Allergies  Meds     OB Status            Past Medical History:   Diagnosis Date    Anticoagulated     Atrial flutter (Multi)     CAD (coronary artery disease)     s/p CABG 2019    Cardiac pacemaker in situ 2019    [facility]    Cardiomyopathy (Multi)     Diverticulitis     GERD (gastroesophageal reflux disease)     HTN (hypertension)     Mitral valve disorder     s/p MV and TV repair 2019      Past Surgical History:   Procedure Laterality Date    CARDIAC ELECTROPHYSIOLOGY MAPPING AND ABLATION  10/2019    CARDIAC PACEMAKER PLACEMENT  09/19/2019    [facility]    COLONOSCOPY      CORONARY ARTERY BYPASS GRAFT  09/11/2019    Left internal mammary to left     anterior descending; sequential saphenous vein to marginal 1     and diagonal 1.    KNEE ARTHROSCOPY W/ DEBRIDEMENT      MITRAL VALVE REPAIR  09/11/2019    Radical repair of mitral valve: Posterior leaflet chordae,     #29 ATS ring.    TRICUSPID VALVE SURGERY  09/11/2019    Tricuspid valve repair: #29 ATS ring.    TUBAL LIGATION  06/15/2015    Tubal Ligation     Social History     Tobacco Use    Smoking status: Former     Current packs/day: 0.00     Types: Cigarettes     Quit date:  1983     Years since quittin.6    Smokeless tobacco: Never   Substance Use Topics    Alcohol use: Not Currently    Drug use: Yes     Types: Marijuana      Current Outpatient Medications   Medication Instructions    acetaminophen (Tylenol) 325 mg tablet oral, Every 4 hours PRN    amLODIPine (NORVASC) 10 mg, oral, Daily    aspirin 81 mg EC tablet oral    atorvastatin (LIPITOR) 40 mg, oral, Daily    cholecalciferol (Vitamin D-3) 125 MCG (5000 UT) capsule oral    ferrous sulfate 325 (65 Fe) MG tablet 65 mg of iron, oral    gabapentin (NEURONTIN) 300 mg, oral, Nightly    lisinopril 20 mg, oral, Daily    magnesium oxide (Mag-Ox) 400 mg (241.3 mg magnesium) tablet 1 tablet, oral, Daily    metoprolol tartrate (LOPRESSOR) 50 mg, oral, Every 12 hours    olopatadine (Patanol) 0.1 % ophthalmic solution ophthalmic (eye)    sertraline (ZOLOFT) 25 mg, oral, Daily, After 1 week , take 2 pills everyday    sodium chloride (Ocean) 0.65 % nasal spray 1 spray, nasal, As needed    spironolactone (ALDACTONE) 25 mg, oral, Daily    warfarin (COUMADIN) 3 mg, oral, Every evening, Take as directed      Allergies   Allergen Reactions    Diethyltoluamide Swelling    Pneumococcal 13-Valent Conjugate To Diphtheria Crm Hives and Other     Fever    Shellfish Containing Products Swelling     Facial swelling, throat walling    Shellfish Derived Unknown    Sulfa (Sulfonamide Antibiotics) Hives        Chemistry    Lab Results   Component Value Date/Time     2024 0613    K 3.9 2024 0613     2024 0613    CO2 22 2024 0613    BUN 9 2024 0613    CREATININE 1.03 2024 0613    Lab Results   Component Value Date/Time    CALCIUM 9.2 2024 0613    ALKPHOS 75 2024 1301    AST 26 2024 1301    ALT 9 2024 1301    BILITOT 0.5 2024 1301          Lab Results   Component Value Date    HGBA1C 5.7 (H) 2023     Lab Results   Component Value Date/Time    WBC 9.8 2024 0614    HGB 12.7  "04/19/2024 0614    HCT 38.6 04/19/2024 0614     04/19/2024 0614     Lab Results   Component Value Date/Time    PROTIME 26.4 (H) 04/19/2024 0613    INR 3.30 08/09/2024 0000     No results found for: \"ABORH\"  Encounter Date: 04/17/24   Electrocardiogram, 12-lead PRN ACS symptoms   Result Value    Ventricular Rate 66    Atrial Rate 66    MI Interval 136    QRS Duration 88    QT Interval 398    QTC Calculation(Bazett) 417    P Axis 90    R Axis 45    T Axis 62    QRS Count 10    Q Onset 212    P Onset 161    P Offset 195    T Offset 411    QTC Fredericia 411    Narrative    Atrial-paced rhythm with Premature atrial complexes  Abnormal ECG  When compared with ECG of 12-APR-2024 08:38,  Premature atrial complexes are now Present  See ED provider note for full interpretation and clinical correlation  Confirmed by Shira Willis ([id number]) on 4/18/2024 10:23:30 AM     No results found for this or any previous visit from the past 1095 days.   Echo 4/28/2021:  Left Ventricle: The left ventricular systolic function is normal, with an estimated ejection fraction of 65%. There are no regional wall motion abnormalities. The left ventricular cavity size is decreased. There is left ventricular concentric remodeling. Abnormal (paradoxical) septal motion consistent with post-operative status. Spectral Doppler shows an abnormal pattern of left ventricular diastolic filling.  Left Atrium: The left atrium is upper limits of normal in size.  Right Ventricle: The right ventricle is normal in size. There is reduced right ventricular systolic function. A device is visualized in the right ventricle.  Right Atrium: The right atrium is normal in size.  Aortic Valve: The aortic valve is trileaflet. There is no evidence of aortic valve regurgitation. The peak instantaneous gradient of the aortic valve is 5.9 mmHg.  Mitral Valve: The mitral valve is abnormal. Status post mitral annular ring repair. There is mild mitral valve " regurgitation. MV repair with a #29 Simulus ring. The mean MV diastolic gradient is 3.3 mmHg at a heart rate of 72 bpm.  Tricuspid Valve: The tricuspid valve is abnormal. Status post triscuspid annular ring repair. There is mild tricuspid regurgitation. The Doppler estimated RVSP is mildly elevated at 38.0 mmHg. TV repair with a #29 Simulus ring. The mean TV diastolic gradient is 2.7 mmHg at a heart rate of 72 bpm.  Pulmonic Valve: The pulmonic valve is structurally normal. There is trace pulmonic valve regurgitation.  Pericardium: There is no pericardial effusion noted. There is an anterior clear space.  Aorta: The aortic root is normal. The ascending aorta is at the upper limits of normal (3.4 cm).  In comparison to the previous echocardiogram(s): Compared with study from 9/14/2019, no significant change.     CONCLUSIONS:   1. The left ventricular systolic function is normal with a 65% estimated ejection fraction.   2. Abnormal septal motion consistent with post-operative status.   3. Spectral Doppler shows an abnormal pattern of left ventricular diastolic filling.   4. The tricuspid valve is abnormal.   5. Mildly elevated RVSP.   6. The left ventricular cavity size is decreased.    Visit Vitals  OB Status Postmenopausal   Smoking Status Former     No data recorded    Physical Exam    Airway  Mallampati: III  TM distance: >3 FB  Neck ROM: full     Cardiovascular - normal exam  Rhythm: regular  Rate: normal     Dental    Pulmonary - normal exam     Abdominal - normal exam       Other findings: 2019: Easy mask, mac 3 x2 without success, glidescope 3, grade 1, 1 attempt with glidescope.          Anesthesia Plan    History of general anesthesia?: yes  History of complications of general anesthesia?: no    ASA 3     MAC   (With standard ASA monitoring.)  intravenous induction   Anesthetic plan and risks discussed with patient.    Plan discussed with CRNA and CAA.

## 2024-08-21 NOTE — DISCHARGE INSTRUCTIONS

## 2024-08-22 ENCOUNTER — APPOINTMENT (OUTPATIENT)
Dept: SURGERY | Facility: CLINIC | Age: 79
End: 2024-08-22
Payer: MEDICARE

## 2024-08-22 VITALS
TEMPERATURE: 97.9 F | DIASTOLIC BLOOD PRESSURE: 72 MMHG | OXYGEN SATURATION: 100 % | SYSTOLIC BLOOD PRESSURE: 131 MMHG | BODY MASS INDEX: 24.16 KG/M2 | HEIGHT: 65 IN | HEART RATE: 77 BPM | WEIGHT: 145 LBS

## 2024-08-22 DIAGNOSIS — K57.92 DIVERTICULITIS: ICD-10-CM

## 2024-08-22 PROCEDURE — 1159F MED LIST DOCD IN RCRD: CPT | Performed by: SURGERY

## 2024-08-22 PROCEDURE — 99205 OFFICE O/P NEW HI 60 MIN: CPT | Performed by: SURGERY

## 2024-08-22 PROCEDURE — 1157F ADVNC CARE PLAN IN RCRD: CPT | Performed by: SURGERY

## 2024-08-22 PROCEDURE — 1036F TOBACCO NON-USER: CPT | Performed by: SURGERY

## 2024-08-22 PROCEDURE — 1126F AMNT PAIN NOTED NONE PRSNT: CPT | Performed by: SURGERY

## 2024-08-22 PROCEDURE — 3075F SYST BP GE 130 - 139MM HG: CPT | Performed by: SURGERY

## 2024-08-22 PROCEDURE — 3078F DIAST BP <80 MM HG: CPT | Performed by: SURGERY

## 2024-08-22 ASSESSMENT — ENCOUNTER SYMPTOMS
LOSS OF SENSATION IN FEET: 0
OCCASIONAL FEELINGS OF UNSTEADINESS: 0
DEPRESSION: 0

## 2024-08-22 ASSESSMENT — PAIN SCALES - GENERAL
PAINLEVEL: 0-NO PAIN
PAINLEVEL_OUTOF10: 0 - NO PAIN

## 2024-08-26 ENCOUNTER — ANTICOAGULATION - WARFARIN VISIT (OUTPATIENT)
Dept: CARDIOLOGY | Facility: HOSPITAL | Age: 79
End: 2024-08-26
Payer: MEDICARE

## 2024-08-26 DIAGNOSIS — Z95.2 S/P MVR (MITRAL VALVE REPLACEMENT): Primary | ICD-10-CM

## 2024-08-26 LAB
POC INR: 1.9
POC PROTHROMBIN TIME: NORMAL

## 2024-08-26 PROCEDURE — 99211 OFF/OP EST MAY X REQ PHY/QHP: CPT

## 2024-08-26 PROCEDURE — 85610 PROTHROMBIN TIME: CPT | Mod: QW

## 2024-08-26 NOTE — PROGRESS NOTES
Patient identification verified with 2 identifiers.    Location: [facility] [address] [place] [address] [phone] option #2     Referring Physician: Dr. Carlos Valencia   Enrollment/ Re-enrollment date: 2025  INR Goal: 2.0-3.0  INR monitoring is per AMS protocol.  Anticoagulation Medication: warfarin 3 mg tablets   Indication: Mitral Valve Replacement      Subjective   Bleeding signs/symptoms: No    Bruising: No   Major bleeding event: No  Thrombosis signs/symptoms: No  Thromboembolic event: No  Missed doses: No  Extra doses: No  Medication changes: No  Dietary changes: No  Change in health: No  Change in activity: No  Alcohol: No  Other concerns: No    Upcoming Procedures:  Does the Patient Have any upcoming procedures that require interruption in anticoagulation therapy? no  Does the patient require bridging? no      Anticoagulation Summary  As of 2024      INR goal:  2.0-3.0   TTR:  69.0% (10.5 mo)   INR used for dosin.90 (2024)   Weekly warfarin total:  21 mg               Assessment/Plan   Subtherapeutic     1. New dose: no change    2. Next INR: 1 week      Education provided to patient during the visit:  Patient instructed to call in interim with questions, concerns and changes.   Patient educated on signs of bleeding/clotting.   Patient educated on compliance with dosing, follow up appointments, and prescribed plan of care.

## 2024-08-27 LAB
LABORATORY COMMENT REPORT: NORMAL
PATH REPORT.FINAL DX SPEC: NORMAL
PATH REPORT.GROSS SPEC: NORMAL
PATH REPORT.TOTAL CANCER: NORMAL

## 2024-09-03 ENCOUNTER — ANTICOAGULATION - WARFARIN VISIT (OUTPATIENT)
Dept: CARDIOLOGY | Facility: HOSPITAL | Age: 79
End: 2024-09-03
Payer: MEDICARE

## 2024-09-03 DIAGNOSIS — Z95.2 S/P MVR (MITRAL VALVE REPLACEMENT): Primary | ICD-10-CM

## 2024-09-03 LAB
POC INR: 2.5
POC PROTHROMBIN TIME: NORMAL

## 2024-09-03 PROCEDURE — 99211 OFF/OP EST MAY X REQ PHY/QHP: CPT

## 2024-09-03 PROCEDURE — 85610 PROTHROMBIN TIME: CPT | Mod: QW

## 2024-09-03 NOTE — PROGRESS NOTES
Patient identification verified with 2 identifiers.    Location: [facility] [address] [place] [address] [phone] option #2     Referring Physician: Dr. Carlos Valencia   Enrollment/ Re-enrollment date: 2025  INR Goal: 2.0-3.0  INR monitoring is per AMS protocol.  Anticoagulation Medication: warfarin 3 mg tablets   Indication: Mitral Valve Replacement      Subjective   Bleeding signs/symptoms: No    Bruising: No   Major bleeding event: No  Thrombosis signs/symptoms: No  Thromboembolic event: No  Missed doses: No  Extra doses: No  Medication changes: No  Dietary changes: No  Change in health: No  Change in activity: No  Alcohol: No  Other concerns: No    Upcoming Procedures:  Does the Patient Have any upcoming procedures that require interruption in anticoagulation therapy? no  Does the patient require bridging? no      Anticoagulation Summary  As of 9/3/2024      INR goal:  2.0-3.0   TTR:  69.3% (10.8 mo)   INR used for dosin.50 (9/3/2024)   Weekly warfarin total:  21 mg               Assessment/Plan       Therapeutic  Maintain dose  RTC in 2 weeks    Education provided to patient during the visit:  Patient instructed to call in interim with questions, concerns and changes.   Patient educated on signs of bleeding/clotting.   Patient educated on compliance with dosing, follow up appointments, and prescribed plan of care.         [Consultation] : a consultation visit [FreeTextEntry1] : cva

## 2024-09-04 ENCOUNTER — HOSPITAL ENCOUNTER (OUTPATIENT)
Dept: RADIOLOGY | Facility: EXTERNAL LOCATION | Age: 79
Discharge: HOME | End: 2024-09-04

## 2024-09-04 DIAGNOSIS — R05.9 COUGH, UNSPECIFIED TYPE: ICD-10-CM

## 2024-09-12 ENCOUNTER — APPOINTMENT (OUTPATIENT)
Dept: PRIMARY CARE | Facility: CLINIC | Age: 79
End: 2024-09-12
Payer: MEDICARE

## 2024-09-12 VITALS
HEART RATE: 87 BPM | DIASTOLIC BLOOD PRESSURE: 70 MMHG | SYSTOLIC BLOOD PRESSURE: 150 MMHG | OXYGEN SATURATION: 98 % | BODY MASS INDEX: 24.49 KG/M2 | WEIGHT: 147 LBS | HEIGHT: 65 IN

## 2024-09-12 DIAGNOSIS — M15.9 GOA (GENERALIZED OSTEOARTHRITIS): ICD-10-CM

## 2024-09-12 DIAGNOSIS — Z95.1 S/P CABG (CORONARY ARTERY BYPASS GRAFT): ICD-10-CM

## 2024-09-12 DIAGNOSIS — Z12.31 VISIT FOR SCREENING MAMMOGRAM: ICD-10-CM

## 2024-09-12 DIAGNOSIS — Z78.0 ASYMPTOMATIC MENOPAUSE: Primary | ICD-10-CM

## 2024-09-12 DIAGNOSIS — I10 PRIMARY HYPERTENSION: ICD-10-CM

## 2024-09-12 PROCEDURE — 1124F ACP DISCUSS-NO DSCNMKR DOCD: CPT | Performed by: STUDENT IN AN ORGANIZED HEALTH CARE EDUCATION/TRAINING PROGRAM

## 2024-09-12 PROCEDURE — 3078F DIAST BP <80 MM HG: CPT | Performed by: STUDENT IN AN ORGANIZED HEALTH CARE EDUCATION/TRAINING PROGRAM

## 2024-09-12 PROCEDURE — 1036F TOBACCO NON-USER: CPT | Performed by: STUDENT IN AN ORGANIZED HEALTH CARE EDUCATION/TRAINING PROGRAM

## 2024-09-12 PROCEDURE — 1157F ADVNC CARE PLAN IN RCRD: CPT | Performed by: STUDENT IN AN ORGANIZED HEALTH CARE EDUCATION/TRAINING PROGRAM

## 2024-09-12 PROCEDURE — G0439 PPPS, SUBSEQ VISIT: HCPCS | Performed by: STUDENT IN AN ORGANIZED HEALTH CARE EDUCATION/TRAINING PROGRAM

## 2024-09-12 PROCEDURE — 1159F MED LIST DOCD IN RCRD: CPT | Performed by: STUDENT IN AN ORGANIZED HEALTH CARE EDUCATION/TRAINING PROGRAM

## 2024-09-12 PROCEDURE — 1160F RVW MEDS BY RX/DR IN RCRD: CPT | Performed by: STUDENT IN AN ORGANIZED HEALTH CARE EDUCATION/TRAINING PROGRAM

## 2024-09-12 PROCEDURE — 99214 OFFICE O/P EST MOD 30 MIN: CPT | Performed by: STUDENT IN AN ORGANIZED HEALTH CARE EDUCATION/TRAINING PROGRAM

## 2024-09-12 PROCEDURE — 3077F SYST BP >= 140 MM HG: CPT | Performed by: STUDENT IN AN ORGANIZED HEALTH CARE EDUCATION/TRAINING PROGRAM

## 2024-09-12 RX ORDER — METOPROLOL TARTRATE 50 MG/1
50 TABLET ORAL EVERY 12 HOURS
Qty: 180 TABLET | Refills: 3 | Status: SHIPPED | OUTPATIENT
Start: 2024-09-12

## 2024-09-12 RX ORDER — LISINOPRIL 30 MG/1
30 TABLET ORAL DAILY
Qty: 100 TABLET | Refills: 1 | Status: SHIPPED | OUTPATIENT
Start: 2024-09-12 | End: 2025-10-17

## 2024-09-12 RX ORDER — AMLODIPINE BESYLATE 10 MG/1
10 TABLET ORAL DAILY
Qty: 90 TABLET | Refills: 1 | Status: SHIPPED | OUTPATIENT
Start: 2024-09-12

## 2024-09-12 RX ORDER — ACETAMINOPHEN AND CODEINE PHOSPHATE 300; 15 MG/1; MG/1
1 TABLET ORAL EVERY 8 HOURS PRN
Qty: 90 TABLET | Refills: 0 | Status: SHIPPED | OUTPATIENT
Start: 2024-09-12 | End: 2024-10-12

## 2024-09-12 ASSESSMENT — ACTIVITIES OF DAILY LIVING (ADL)
MANAGING_FINANCES: INDEPENDENT
TAKING_MEDICATION: INDEPENDENT
GROCERY_SHOPPING: INDEPENDENT
DOING_HOUSEWORK: INDEPENDENT

## 2024-09-12 NOTE — PROGRESS NOTES
"Subjective   Patient ID: Neha Tsang is a 78 y.o. female who presents for Follow-up (2 month follow-up. Pt declined flu shot. ).        HPI  77y/o female with HTN, HPL , CAD s/p CABG , mitral and tricuspid valve repair , atrial flutter ablation and pacemaker placement in 2019, ON Ac, est with coumadin clinic , recurrent diverticulitis , advanced DJD L spine        Pain limiting ADLS due to severe spondylosis           Visit Vitals  /70   Pulse 87   Ht 1.651 m (5' 5\")   Wt 66.7 kg (147 lb)   SpO2 98%   BMI 24.46 kg/m²   OB Status Postmenopausal   Smoking Status Former   BSA 1.75 m²      No LMP recorded. Patient is postmenopausal.   Current Outpatient Medications   Medication Instructions    acetaminophen (Tylenol) 325 mg tablet oral, Every 4 hours PRN    acetaminophen-codeine (Tylenol #2) 300-15 mg tablet 1 tablet, oral, Every 8 hours PRN    amLODIPine (NORVASC) 10 mg, oral, Daily    aspirin 81 mg EC tablet oral    atorvastatin (LIPITOR) 40 mg, oral, Daily    cholecalciferol (Vitamin D-3) 125 MCG (5000 UT) capsule oral    ferrous sulfate 325 (65 Fe) MG tablet 65 mg of iron, oral    gabapentin (NEURONTIN) 300 mg, oral, Nightly    lisinopril 30 mg, oral, Daily    magnesium oxide (Mag-Ox) 400 mg (241.3 mg magnesium) tablet 1 tablet, oral, Daily    metoprolol tartrate (LOPRESSOR) 50 mg, oral, Every 12 hours    spironolactone (ALDACTONE) 25 mg, oral, Daily    warfarin (COUMADIN) 3 mg, oral, Every evening, Take as directed      Social History     Tobacco Use    Smoking status: Former     Current packs/day: 0.00     Types: Cigarettes     Quit date:      Years since quittin.7    Smokeless tobacco: Never   Substance Use Topics    Alcohol use: Not Currently        Review of Systems    Constitutional : No feeling poorly / fevers/ chills / night sweats/ fatigue   Cardiovascular : No CP /Palpitations/ lower extremity edema / syncope   Respiratory : No Cough /LARA/Dyspnea at rest   Gastrointestinal : No abd " pain / N/V  No bloody stools/ melena / constipation  Endo : No polyuria/polydipsia/ muscle weakness / sluggishness   CNS: No confusion / HA/ tingling/ numbness/ weakness of extremities  Psychiatric: No anxiety/ depression/ SI/HI    All other systems have been reviewed and are negative for complaint       Physical Exam    Constitutional : Vitals reviewed. Alert and in no distress  Cardiovascular : RRR, Normal S1, S2, No pericardial rub/ gallop, no peripheral edema   Pulmonary: No respiratory distress, CTAB   MSK : Normal gait and station , strength and tone   Skin: Warm to touch ,  normal skin turgor   Neurologic : CNs 2-12 grossly intact , no obvious FNDs  Psych : A,Ox3, normal mood and affect      Assessment/Plan   Diagnoses and all orders for this visit:  Asymptomatic menopause  -     XR DEXA bone density; Future  Primary hypertension  -     metoprolol tartrate (Lopressor) 50 mg tablet; Take 1 tablet by mouth every 12 hours.  -     amLODIPine (Norvasc) 10 mg tablet; Take 1 tablet (10 mg) by mouth once daily.  S/P CABG (coronary artery bypass graft)  -     lisinopril 30 mg tablet; Take 1 tablet (30 mg) by mouth once daily.  Visit for screening mammogram  -     BI mammo bilateral screening tomosynthesis; Future  GOA (generalized osteoarthritis)  -     acetaminophen-codeine (Tylenol #2) 300-15 mg tablet; Take 1 tablet by mouth every 8 hours if needed for severe pain (7 - 10).      77y/o female with HTN, HPL , CAD s/p CABG , mitral and tricuspid valve repair , atrial flutter ablation and pacemaker placement in 2019, ON Ac, est with coumadin clinic , recurrent diverticulitis , advanced DJD L spine      Increase Lisinopril to 30mg   Pdmp reviewed   [facility] current uds current   Rx sent               Conditions addressed and mgmt as noted above.  Pertinent labs, images/ imaging reports , chart review was done .   Age appropriate labs / labs for mgmt of chronic medical conditions ordered, further mgmt pending the results.        This note is intended for the physician writing it, as well as to communicate findings to other healthcare professionals. These notes use medical lexicon that may be misunderstood by non medical persons. Therefore, interpretations of medical notes and terminology should be approached with caution.

## 2024-09-12 NOTE — PROGRESS NOTES
Subjective   Reason for Visit: Neha Tsang is an 78 y.o. female here for a Medicare Wellness visit.     Past Medical, Surgical, and Family History reviewed and updated in chart.    Reviewed all medications by prescribing practitioner or clinical pharmacist (such as prescriptions, OTCs, herbal therapies and supplements) and documented in the medical record.    HPI      79y/o female with HTN, HPL , CAD s/p CABG , mitral and tricuspid valve repair , atrial flutter ablation and pacemaker placement in 2019, ON Ac, est with coumadin clinic , recurrent diverticulitis , advanced DJD L spine      Pt reports feeling better since the last visit on   She has tried to distance herself from the source of her anxiety         Patient Care Team:  Octavia Marques MD as PCP - General (Family Medicine)  Octavia Marques MD as PCP - [facility]     Current Outpatient Medications   Medication Instructions    acetaminophen (Tylenol) 325 mg tablet oral, Every 4 hours PRN    amLODIPine (NORVASC) 10 mg, oral, Daily    aspirin 81 mg EC tablet oral    atorvastatin (LIPITOR) 40 mg, oral, Daily    cholecalciferol (Vitamin D-3) 125 MCG (5000 UT) capsule oral    ferrous sulfate 325 (65 Fe) MG tablet 65 mg of iron, oral    gabapentin (NEURONTIN) 300 mg, oral, Nightly    lisinopril 30 mg, oral, Daily    magnesium oxide (Mag-Ox) 400 mg (241.3 mg magnesium) tablet 1 tablet, oral, Daily    metoprolol tartrate (LOPRESSOR) 50 mg, oral, Every 12 hours    spironolactone (ALDACTONE) 25 mg, oral, Daily    warfarin (COUMADIN) 3 mg, oral, Every evening, Take as directed        Social History     Tobacco Use    Smoking status: Former     Current packs/day: 0.00     Types: Cigarettes     Quit date:      Years since quittin.7    Smokeless tobacco: Never   Substance Use Topics    Alcohol use: Not Currently        Review of Systems  Constitutional: no chills, no fever and no night sweats.     Eyes: no blurred  "vision and no eyesight problems.     ENT: no hearing loss, no nasal congestion, no nasal discharge, no hoarseness and no sore throat.     Cardiovascular: no chest pain, no intermittent leg claudication, no lower extremity edema, no palpitations and no syncope.     Respiratory: no cough, no shortness of breath during exertion, no shortness of breath at rest and no wheezing.     Gastrointestinal: no abdominal pain, no blood in stools, no constipation, no diarrhea, no melena, no nausea, no rectal pain and no vomiting.     Genitourinary: no dysuria, no change in urinary frequency, no urinary hesitancy, no feelings of urinary urgency and no vaginal discharge.     Musculoskeletal: no arthralgias, no back pain and no myalgias.     Integumentary: no new skin lesions and no rashes.     Neurological: no difficulty walking, no headache, no limb weakness, no numbness and no tingling.     Psychiatric: no anxiety, no depression, no anhedonia and no substance use disorders.     Endocrine: no recent weight gain and no recent weight loss.     Hematologic/Lymphatic: no tendency for easy bruising and no swollen glands.          All other systems have been reviewed and are negative for complaint.    Objective   Vitals:  /70   Pulse 87   Ht 1.651 m (5' 5\")   Wt 66.7 kg (147 lb)   SpO2 98%   BMI 24.46 kg/m²       Physical Exam    Constitutional: Alert and in no acute distress. Well developed, well nourished.     Eyes: Normal external exam. .     Ears, Nose, Mouth, and Throat: External inspection of ears and nose: Normal.    Neck: No neck mass was observed. Supple.     Cardiovascular: Heart rate and rhythm were normal, normal S1 and S2, no gallops, no murmurs and no pericardial rub    Pulmonary: No respiratory distress. Clear bilateral breath sounds.       Musculoskeletal: No joint swelling seen, normal movements of all extremities. Range of motion: Normal.  Muscle strength/tone: Normal.          Neurologic: Deep tendon " reflexes were 2+ and symmetric. Sensation: Normal.     Psychiatric: Judgment and insight: Intact. Mood and affect: Normal.      Assessment/Plan   Problem List Items Addressed This Visit       Hypertension    Relevant Medications    metoprolol tartrate (Lopressor) 50 mg tablet    amLODIPine (Norvasc) 10 mg tablet    S/P CABG (coronary artery bypass graft)    Relevant Medications    lisinopril 30 mg tablet     79y/o female with HTN, HPL , CAD s/p CABG , mitral and tricuspid valve repair , atrial flutter ablation and pacemaker placement in 2019, ON Ac, est with coumadin clinic , recurrent diverticulitis , advanced DJD L spine       Immunizations :  Influenza :  declined   Prevnar 20 : Given t previously   Pneumovax 23: Given  in 2015  Shingles:   recommended to receive at the pharmacy     Cancer screenings:   Mammogram :   Screening ordered  Cervical cancer:  Screening  not indicated  Colon cancer:     Screening current  , march 2023   Lung cancer :    not indicated  HIV screening:  / not indicated  Osteoporosis :   Screening  ordered , osteopenia in 2021        This note is intended for the physician writing it, as well as to communicate findings to other healthcare professionals. These notes use medical lexicon that may be misunderstood by non medical persons. Therefore, interpretations of medical notes and terminology should be approached with caution.

## 2024-09-17 ENCOUNTER — ANTICOAGULATION - WARFARIN VISIT (OUTPATIENT)
Dept: CARDIOLOGY | Facility: HOSPITAL | Age: 79
End: 2024-09-17
Payer: MEDICARE

## 2024-09-17 DIAGNOSIS — Z95.2 S/P MVR (MITRAL VALVE REPLACEMENT): Primary | ICD-10-CM

## 2024-09-17 LAB
POC INR: 4
POC PROTHROMBIN TIME: NORMAL

## 2024-09-17 PROCEDURE — 99211 OFF/OP EST MAY X REQ PHY/QHP: CPT

## 2024-09-17 PROCEDURE — 85610 PROTHROMBIN TIME: CPT | Mod: QW

## 2024-09-17 NOTE — PROGRESS NOTES
Patient identification verified with 2 identifiers.    Location: [facility] - [place] [address] [place] [address] [phone] option #2     Referring Physician: Dr. Carlos Valencia   Enrollment/ Re-enrollment date: 2025  INR Goal: 2.0-3.0  INR monitoring is per AMS protocol.  Anticoagulation Medication: warfarin 3 mg tablets   Indication: Mitral Valve Replacement      Subjective   Bleeding signs/symptoms: No    Bruising: No   Major bleeding event: No  Thrombosis signs/symptoms: No  Thromboembolic event: No  Missed doses: No  Extra doses: No  Medication changes: Yes  Dietary changes: Yes  Change in health: No  Change in activity: No  Alcohol: No  Other concerns: No    Upcoming Procedures:  Does the Patient Have any upcoming procedures that require interruption in anticoagulation therapy? no  Does the patient require bridging? no      Anticoagulation Summary  As of 2024      INR goal:  2.0-3.0   TTR:  67.9% (11.3 mo)   INR used for dosin.00 (2024)   Weekly warfarin total:  21 mg               Assessment/Plan   Supratherapeutic     1. New dose:  no dose change , hold 2 days (today  and tomorrow  only )     2. Next INR: 1 week      Education provided to patient during the visit:  Patient instructed to call in interim with questions, concerns and changes.   Patient educated on interactions between medications and warfarin.   Patient educated on dietary consistency in vitamin k consumption.   Patient educated on signs of bleeding/clotting.   Patient educated on compliance with dosing, follow up appointments, and prescribed plan of care.

## 2024-09-24 ENCOUNTER — ANTICOAGULATION - WARFARIN VISIT (OUTPATIENT)
Dept: CARDIOLOGY | Facility: HOSPITAL | Age: 79
End: 2024-09-24
Payer: MEDICARE

## 2024-09-24 DIAGNOSIS — Z95.2 S/P MVR (MITRAL VALVE REPLACEMENT): Primary | ICD-10-CM

## 2024-09-24 LAB
POC INR: 1.4
POC PROTHROMBIN TIME: NORMAL

## 2024-09-24 PROCEDURE — 99211 OFF/OP EST MAY X REQ PHY/QHP: CPT

## 2024-09-24 PROCEDURE — 85610 PROTHROMBIN TIME: CPT | Mod: QW

## 2024-09-24 NOTE — PROGRESS NOTES
Patient identification verified with 2 identifiers.    Location: [facility] - [place] [address] [place] [address] [phone] option #2     Referring Physician: Dr. Carlos Valencia   Enrollment/ Re-enrollment date: 2025  INR Goal: 2.0-3.0  INR monitoring is per AMS protocol.  Anticoagulation Medication: warfarin 3 mg tablets   Indication: Mitral Valve Replacement      Subjective   Bleeding signs/symptoms: No    Bruising: No   Major bleeding event: No  Thrombosis signs/symptoms: No  Thromboembolic event: No  Missed doses: No  Extra doses: No  Medication changes: No  Dietary changes: No  Change in health: No  Change in activity: No  Alcohol: No  Other concerns: No    Upcoming Procedures:  Does the Patient Have any upcoming procedures that require interruption in anticoagulation therapy? no  Does the patient require bridging? no      Anticoagulation Summary  As of 2024      INR goal:  2.0-3.0   TTR:  67.3% (11.5 mo)   INR used for dosin.40 (2024)   Weekly warfarin total:  21 mg               Assessment/Plan   Sub-Therapuetic     1. New dose: maintain  2. Next INR: 1 week      Education provided to patient during the visit:  Patient instructed to call in interim with questions, concerns and changes.   Patient educated on interactions between medications and warfarin.   Patient educated on dietary consistency in vitamin k consumption.   Patient educated on signs of bleeding/clotting.   Patient educated on compliance with dosing, follow up appointments, and prescribed plan of care.

## 2024-10-01 ENCOUNTER — APPOINTMENT (OUTPATIENT)
Dept: CARDIOLOGY | Facility: HOSPITAL | Age: 79
End: 2024-10-01
Payer: MEDICARE

## 2024-10-02 ENCOUNTER — ANTICOAGULATION - WARFARIN VISIT (OUTPATIENT)
Dept: CARDIOLOGY | Facility: HOSPITAL | Age: 79
End: 2024-10-02
Payer: MEDICARE

## 2024-10-02 DIAGNOSIS — Z95.2 S/P MVR (MITRAL VALVE REPLACEMENT): ICD-10-CM

## 2024-10-02 LAB
POC INR: 2.1
POC PROTHROMBIN TIME: NORMAL

## 2024-10-02 PROCEDURE — 99211 OFF/OP EST MAY X REQ PHY/QHP: CPT | Performed by: INTERNAL MEDICINE

## 2024-10-02 PROCEDURE — 85610 PROTHROMBIN TIME: CPT | Mod: QW

## 2024-10-02 NOTE — PROGRESS NOTES
Patient identification verified with 2 identifiers.    Location: [facility] - [place] [address] [place] [address] [phone] option #2     Referring Physician: Dr. Carlos Valencia   Enrollment/ Re-enrollment date: 2025  INR Goal: 2.0-3.0  INR monitoring is per AMS protocol.  Anticoagulation Medication: warfarin 3 mg tablets   Indication: Mitral Valve Replacement    Subjective   Bleeding signs/symptoms: No    Bruising: No   Major bleeding event: No  Thrombosis signs/symptoms: No  Thromboembolic event: No  Missed doses: No  Extra doses: No  Medication changes: No  Dietary changes: No  Change in health: No  Change in activity: No  Alcohol: No  Other concerns: No    Upcoming Procedures:  Does the Patient Have any upcoming procedures that require interruption in anticoagulation therapy? no  Does the patient require bridging? no      Anticoagulation Summary  As of 10/2/2024      INR goal:  2.0-3.0   TTR:  66.1% (11.8 mo)   INR used for dosin.10 (10/2/2024)   Weekly warfarin total:  21 mg               Assessment/Plan   Therapeutic     1. New dose: no change    2. Next INR: 1 week      Education provided to patient during the visit:  Patient instructed to call in interim with questions, concerns and changes.   Patient educated on interactions between medications and warfarin.   Patient educated on dietary consistency in vitamin k consumption.   Patient educated on affects of alcohol consumption while taking warfarin.   Patient educated on signs of bleeding/clotting.

## 2024-10-11 ENCOUNTER — APPOINTMENT (OUTPATIENT)
Dept: CARDIOLOGY | Facility: HOSPITAL | Age: 79
End: 2024-10-11
Payer: MEDICARE

## 2024-10-11 ENCOUNTER — ANTICOAGULATION - WARFARIN VISIT (OUTPATIENT)
Dept: CARDIOLOGY | Facility: CLINIC | Age: 79
End: 2024-10-11
Payer: MEDICARE

## 2024-10-11 ENCOUNTER — OFFICE VISIT (OUTPATIENT)
Dept: CARDIOLOGY | Facility: CLINIC | Age: 79
End: 2024-10-11
Payer: MEDICARE

## 2024-10-11 VITALS
OXYGEN SATURATION: 98 % | SYSTOLIC BLOOD PRESSURE: 127 MMHG | HEART RATE: 60 BPM | BODY MASS INDEX: 24.33 KG/M2 | WEIGHT: 146.06 LBS | HEIGHT: 65 IN | DIASTOLIC BLOOD PRESSURE: 71 MMHG

## 2024-10-11 DIAGNOSIS — Z95.2 S/P MVR (MITRAL VALVE REPLACEMENT): Primary | ICD-10-CM

## 2024-10-11 LAB
POC INR: 2.8
POC PROTHROMBIN TIME: NORMAL

## 2024-10-11 PROCEDURE — 99214 OFFICE O/P EST MOD 30 MIN: CPT | Performed by: INTERNAL MEDICINE

## 2024-10-11 PROCEDURE — 3078F DIAST BP <80 MM HG: CPT | Performed by: INTERNAL MEDICINE

## 2024-10-11 PROCEDURE — 1036F TOBACCO NON-USER: CPT | Performed by: INTERNAL MEDICINE

## 2024-10-11 PROCEDURE — 99211 OFF/OP EST MAY X REQ PHY/QHP: CPT

## 2024-10-11 PROCEDURE — 1157F ADVNC CARE PLAN IN RCRD: CPT | Performed by: INTERNAL MEDICINE

## 2024-10-11 PROCEDURE — 1125F AMNT PAIN NOTED PAIN PRSNT: CPT | Performed by: INTERNAL MEDICINE

## 2024-10-11 PROCEDURE — 1159F MED LIST DOCD IN RCRD: CPT | Performed by: INTERNAL MEDICINE

## 2024-10-11 PROCEDURE — 3074F SYST BP LT 130 MM HG: CPT | Performed by: INTERNAL MEDICINE

## 2024-10-11 PROCEDURE — 1160F RVW MEDS BY RX/DR IN RCRD: CPT | Performed by: INTERNAL MEDICINE

## 2024-10-11 PROCEDURE — 85610 PROTHROMBIN TIME: CPT | Mod: QW

## 2024-10-11 ASSESSMENT — COLUMBIA-SUICIDE SEVERITY RATING SCALE - C-SSRS
1. IN THE PAST MONTH, HAVE YOU WISHED YOU WERE DEAD OR WISHED YOU COULD GO TO SLEEP AND NOT WAKE UP?: NO
6. HAVE YOU EVER DONE ANYTHING, STARTED TO DO ANYTHING, OR PREPARED TO DO ANYTHING TO END YOUR LIFE?: NO
2. HAVE YOU ACTUALLY HAD ANY THOUGHTS OF KILLING YOURSELF?: NO

## 2024-10-11 ASSESSMENT — PATIENT HEALTH QUESTIONNAIRE - PHQ9
2. FEELING DOWN, DEPRESSED OR HOPELESS: NOT AT ALL
SUM OF ALL RESPONSES TO PHQ9 QUESTIONS 1 AND 2: 0
1. LITTLE INTEREST OR PLEASURE IN DOING THINGS: NOT AT ALL

## 2024-10-11 ASSESSMENT — ENCOUNTER SYMPTOMS
DEPRESSION: 0
OCCASIONAL FEELINGS OF UNSTEADINESS: 0
LOSS OF SENSATION IN FEET: 0

## 2024-10-11 ASSESSMENT — PAIN SCALES - GENERAL: PAINLEVEL: 2

## 2024-10-11 NOTE — PROGRESS NOTES
Primary Care Physician: Octavia Marques MD  Date of Visit: 10/11/2024 10:00 AM EDT  [place] of [place] [facility] [place] [address]     Chief Complaint:   Chief Complaint   Patient presents with    Follow-up    Coronary Artery Disease     Atrial Flutter        HPI / Summary:   Neha Tsang is a 78 y.o. female presents for followup.   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  Normal RV and LV function  Discussed surgery for Diverticulitis.    Living with her youngest daughter, was living with other daughter.    No tobacco, occasional ETOH.      Specialty Problems          Cardiovascular    Complete heart block    Hyperlipidemia    Hypertension    S/P CABG (coronary artery bypass graft)    S/P MVR (mitral valve replacement)    Typical atrial flutter (Multi)    CAD (coronary artery disease)    Community acquired pneumonia    Mitral regurgitation    Pacemaker    Tricuspid regurgitation    Chronic diastolic (congestive) heart failure    Coagulation defect, unspecified        Past Medical History:   Diagnosis Date    Anticoagulated     Atrial flutter (Multi)     CAD (coronary artery disease)     s/p CABG 2019    Cardiac pacemaker in situ 2019    [facility]    Cardiomyopathy     Difficult intubation 2019    Easy mask, mac 3 x2 unsuccessful attempts.  Glidescope #3, grade 1 view, 1 attempt.    Diverticulitis     GERD (gastroesophageal reflux disease)     HTN (hypertension)     Mitral valve disorder     s/p MV and TV repair 2019          Past Surgical History:   Procedure Laterality Date    CARDIAC ELECTROPHYSIOLOGY MAPPING AND ABLATION  10/2019    CARDIAC PACEMAKER PLACEMENT  09/19/2019    [facility]    COLONOSCOPY       CORONARY ARTERY BYPASS GRAFT  09/11/2019    Left internal mammary to left     anterior descending; sequential saphenous vein to marginal 1     and diagonal 1.    KNEE ARTHROSCOPY W/ DEBRIDEMENT      MITRAL VALVE REPAIR  09/11/2019    Radical repair of mitral valve: Posterior leaflet chordae,     #29 ATS ring.    TRICUSPID VALVE SURGERY  09/11/2019    Tricuspid valve repair: #29 ATS ring.    TUBAL LIGATION            Social History:   reports that she quit smoking about 41 years ago. Her smoking use included cigarettes. She has never used smokeless tobacco. She reports that she does not currently use alcohol. She reports current drug use. Drug: Marijuana.      Allergies:  Allergies   Allergen Reactions    Diethyltoluamide Swelling    Pneumococcal 13-Valent Conjugate To Diphtheria Crm Hives and Other     Fever    Shellfish Containing Products Swelling     Facial swelling, throat walling    Shellfish Derived Unknown    Sulfa (Sulfonamide Antibiotics) Hives       Outpatient Medications:  Current Outpatient Medications   Medication Instructions    acetaminophen (Tylenol) 325 mg tablet oral, Every 4 hours PRN    acetaminophen-codeine (Tylenol #2) 300-15 mg tablet 1 tablet, oral, Every 8 hours PRN    amLODIPine (NORVASC) 10 mg, oral, Daily    aspirin 81 mg EC tablet oral    atorvastatin (LIPITOR) 40 mg, oral, Daily    cholecalciferol (Vitamin D-3) 125 MCG (5000 UT) capsule oral    ferrous sulfate 325 (65 Fe) MG tablet 65 mg of iron, oral    gabapentin (NEURONTIN) 300 mg, oral, Nightly    lisinopril 30 mg, oral, Daily    magnesium oxide (Mag-Ox) 400 mg (241.3 mg magnesium) tablet 1 tablet, oral, Daily    metoprolol tartrate (LOPRESSOR) 50 mg, oral, Every 12 hours    spironolactone (ALDACTONE) 25 mg, oral, Daily    warfarin (COUMADIN) 3 mg, oral, Every evening, Take as directed       ROS     Physical Exam:  Vitals:    10/11/24 0951   BP: 127/71   BP Location: Left arm   Patient Position: Sitting   BP Cuff Size: Adult  "  Pulse: 60   SpO2: 98%   Weight: 66.3 kg (146 lb 1 oz)   Height: 1.651 m (5' 5\")     Wt Readings from Last 5 Encounters:   10/11/24 66.3 kg (146 lb 1 oz)   09/12/24 66.7 kg (147 lb)   08/22/24 65.8 kg (145 lb)   08/21/24 64.8 kg (142 lb 13.7 oz)   07/22/24 68 kg (149 lb 14.6 oz)     Body mass index is 24.31 kg/m².     Anxious appearing well-developed female in no acute distress.  Skin was warm dry.  Neck veins were nondistended  Normal carotid upstrokes without bruits.  Regular rhythm soft systolic murmur best base no gallop.  Clear lungs.  Soft abdomen.  No dependent edema with intact lower extremity pulses  Last Labs:  CMP:  Recent Labs     04/19/24  0613 04/18/24  0728 04/17/24  1301 08/28/23  1804 08/25/23  0448    137 137 139 CANCELED   K 3.9 3.9 5.5* 3.7 CANCELED    107 106 101 CANCELED   CO2 22 19* 20* 27 CANCELED   ANIONGAP 14 15 17 15 CANCELED   BUN 9 14 20 7 CANCELED   CREATININE 1.03 0.90 0.97 0.88 CANCELED   EGFR 56* 66 61  --   --    GLUCOSE 86 82 108* 107* CANCELED     Recent Labs     04/17/24  1301 08/22/23  0522 08/21/23  1123 08/17/23  1236 01/03/23  1044 10/05/22  1102   ALBUMIN 4.6 3.6 4.2 4.3 4.2 4.2   ALKPHOS 75 51 62 74 89 77   ALT 9 6* 8 7 21 18   AST 26 13 15 14 27 24   BILITOT 0.5 0.5 0.6 0.6 0.5 0.7   LIPASE 65  --  48  --  56 58     CBC:  Recent Labs     04/19/24  0614 04/18/24  0728 04/17/24  1301 08/25/23  0448 08/24/23  0459   WBC 9.8 13.3* 17.9* CANCELED 12.2*   HGB 12.7 12.4 14.1 CANCELED 13.4   HCT 38.6 38.1 44.3 CANCELED 40.8    216 227 CANCELED 215   MCV 81 81 84 CANCELED 81     COAG:   Recent Labs     10/02/24  0000 09/24/24  0905 09/17/24  0000 09/03/24  0910 08/26/24  0000   INR 2.10 1.40 4.00 2.50 1.90     HEME/ENDO:  Recent Labs     12/18/23  1103 03/13/22  1036 09/23/21  1035 06/11/21  0909 10/23/19  0144 09/08/19  0502 08/22/19  0844   FERRITIN  --   --   --   --  38  --   --    IRONSAT  --   --  16*  --  6*  --   --    TSH 1.27 1.25  --  0.95  --  1.25  " --    HGBA1C 5.7*  --   --   --   --   --  5.4      CARDIAC:   Recent Labs     04/17/24  1301 01/03/23  1044 10/22/22  1758 10/22/22  1627 10/05/22  1102 03/13/22  1036 09/07/19  1056 06/25/19  1118   TROPHS 6 6 13 9 11  --   --   --    BNP  --   --   --  57 142* 74 172* 223*     Recent Labs     12/18/23  1103 09/08/19  0502   CHOL 124 182   LDLF  --  118*   HDL 42.9 34.2*   TRIG 111 151*       Last Cardiology Tests:  ECG:      Echo:  Echo Results:  No results found for this or any previous visit from the past 3650 days.       Cath:      Stress Test:  Stress Results:  No results found for this or any previous visit from the past 365 days.         Cardiac Imaging:  Urgent Care Xray  Narrative: Interpreted By:  Mathew Bauer,   STUDY:  XR URGENT CARE XRAY;  9/4/2024 9:44 am      INDICATION:  Signs/Symptoms:cough.      ,R05.9 Cough, unspecified      COMPARISON:  None.      ACCESSION NUMBER(S):  [id number]      ORDERING CLINICIAN:  ELIJAH RODRÍGUEZ      TECHNIQUE:  Body Part:  Chest  Side:  Frontal lateral  Number of Views:  2      FINDINGS:  Left-sided pulse generator device. Status post median sternotomy. No  consolidation or effusion. No pneumothorax. Heart size within normal  limits. No acute fracture.      Impression: No acute cardiopulmonary findings.          MACRO:  None      Signed by: Mathew Bauer 9/4/2024 10:19 AM  Dictation workstation:   [id number]        Assessment/Plan     She underwent mitral and tricuspid valve repair with three-vessel bypass pacemaker in 2019.  Last echo in 2021 showed minimal regurgitation of the tricuspid and mitral valve.  Doing well clinically with no suggest angina arrhythmia or heart failure.  I have made no other med changes.  From the perspective her dyslipidemia hypertension I think she is well treated.  I suggest to see us again in 6 months at which point we will repeat an echo since well up in 4 years  Orders:  No orders of the defined types were placed in this  encounter.     Followup Appts:  Future Appointments   Date Time Provider Department [place]   10/11/2024 11:00 AM ANTICOAG [facility] [id number] COA CLINIC [id number] [facility]   11/22/2024  2:30 PM [place] [id number] MAMMO 1 [id number] [facility]   11/22/2024  3:00 PM [facility]016 DXA 1 [id number] [facility]   12/12/2024 10:15 AM Octavia Marques MD [id number] [facility]           ____________________________________________________________  Carlos Valencia MD    Senior Attending Physician  [facility]  [facility]

## 2024-10-11 NOTE — PROGRESS NOTES
Patient identification verified with 2 identifiers.    Location: [facility] - [place] [address] [place] [address] [phone] option #2      Referring Physician: Dr. Carlos Valencia   Enrollment/ Re-enrollment date: 2025  INR Goal: 2.0-3.0  INR monitoring is per AMS protocol.  Anticoagulation Medication: warfarin 3 mg tablets   Indication: Mitral Valve Replacement    Subjective   Bleeding signs/symptoms: Yes  pt states she has a liitle gum bleeding, she will see her dentist about it    Bruising: No   Major bleeding event: No  Thrombosis signs/symptoms: No  Thromboembolic event: No  Missed doses: Yes  missed 1 dose  Extra doses: No  Medication changes: No  Dietary changes: No  Change in health: No  Change in activity: No  Alcohol: No  Other concerns: No    Upcoming Procedures:  Does the Patient Have any upcoming procedures that require interruption in anticoagulation therapy? no  Does the patient require bridging? no      Anticoagulation Summary  As of 10/11/2024      INR goal:  2.0-3.0   TTR:  67.0% (1 y)   INR used for dosin.80 (10/11/2024)   Weekly warfarin total:  21 mg               Assessment/Plan   Therapeutic     1. New dose: no change    2. Next INR: 1 week      Education provided to patient during the visit:  Patient instructed to call in interim with questions, concerns and changes.   Patient educated on compliance with dosing, follow up appointments, and prescribed plan of care.

## 2024-10-18 ENCOUNTER — ANTICOAGULATION - WARFARIN VISIT (OUTPATIENT)
Dept: CARDIOLOGY | Facility: HOSPITAL | Age: 79
End: 2024-10-18
Payer: MEDICARE

## 2024-10-18 ENCOUNTER — APPOINTMENT (OUTPATIENT)
Dept: CARDIOLOGY | Facility: CLINIC | Age: 79
End: 2024-10-18
Payer: MEDICARE

## 2024-10-18 ENCOUNTER — APPOINTMENT (OUTPATIENT)
Dept: CARDIOLOGY | Facility: HOSPITAL | Age: 79
End: 2024-10-18
Payer: MEDICARE

## 2024-10-18 DIAGNOSIS — Z95.2 S/P MVR (MITRAL VALVE REPLACEMENT): ICD-10-CM

## 2024-10-24 ENCOUNTER — ANTICOAGULATION - WARFARIN VISIT (OUTPATIENT)
Dept: CARDIOLOGY | Facility: HOSPITAL | Age: 79
End: 2024-10-24
Payer: MEDICARE

## 2024-10-24 DIAGNOSIS — Z95.2 S/P MVR (MITRAL VALVE REPLACEMENT): ICD-10-CM

## 2024-10-24 LAB
POC INR: 3.3
POC PROTHROMBIN TIME: NORMAL

## 2024-10-24 PROCEDURE — 99211 OFF/OP EST MAY X REQ PHY/QHP: CPT | Performed by: INTERNAL MEDICINE

## 2024-10-24 PROCEDURE — 85610 PROTHROMBIN TIME: CPT | Mod: QW

## 2024-10-24 NOTE — PROGRESS NOTES
Patient identification verified with 2 identifiers.    Location: [facility] - [place] [address] [place] [address] [phone] option #2      Referring Physician: Dr. Carlos Valencia   Enrollment/ Re-enrollment date: 8/9/2025  INR Goal: 2.0-3.0  INR monitoring is per AMS protocol.  Anticoagulation Medication: warfarin 3 mg tablets   Indication: Mitral Valve Replacement    Subjective   Bleeding signs/symptoms: No    Bruising: No   Major bleeding event: No  Thrombosis signs/symptoms: No  Thromboembolic event: No  Missed doses: No  Extra doses: Yes  Medication changes: No  Dietary changes: No  Change in health: No  Change in activity: No  Alcohol: No  Other concerns: No    Upcoming Procedures:  Does the Patient Have any upcoming procedures that require interruption in anticoagulation therapy? no  Does the patient require bridging? no      Anticoagulation Summary  As of 10/24/2024      INR goal:  2.0-3.0   TTR:  66.0% (1 y)   INR used for dosing:  3.30 (10/24/2024)   Weekly warfarin total:  21 mg               Assessment/Plan   Supratherapeutic     1. New dose: no change    2. Next INR: 1 week      Education provided to patient during the visit:  Patient instructed to call in interim with questions, concerns and changes.   Patient educated on interactions between medications and warfarin.   Patient educated on dietary consistency in vitamin k consumption.   Patient educated on affects of alcohol consumption while taking warfarin.   Patient educated on signs of bleeding/clotting.   Patient educated on compliance with dosing, follow up appointments, and prescribed plan of care.

## 2024-10-31 ENCOUNTER — ANTICOAGULATION - WARFARIN VISIT (OUTPATIENT)
Dept: CARDIOLOGY | Facility: HOSPITAL | Age: 79
End: 2024-10-31
Payer: MEDICARE

## 2024-10-31 ENCOUNTER — APPOINTMENT (OUTPATIENT)
Dept: CARDIOLOGY | Facility: HOSPITAL | Age: 79
End: 2024-10-31
Payer: MEDICARE

## 2024-10-31 DIAGNOSIS — Z95.2 S/P MVR (MITRAL VALVE REPLACEMENT): Primary | ICD-10-CM

## 2024-11-06 ENCOUNTER — ANTICOAGULATION - WARFARIN VISIT (OUTPATIENT)
Dept: CARDIOLOGY | Facility: HOSPITAL | Age: 79
End: 2024-11-06
Payer: MEDICARE

## 2024-11-06 DIAGNOSIS — Z95.2 S/P MVR (MITRAL VALVE REPLACEMENT): Primary | ICD-10-CM

## 2024-11-06 LAB
POC INR: 2
POC PROTHROMBIN TIME: NORMAL

## 2024-11-06 PROCEDURE — 99211 OFF/OP EST MAY X REQ PHY/QHP: CPT

## 2024-11-06 PROCEDURE — 85610 PROTHROMBIN TIME: CPT | Mod: QW

## 2024-11-06 NOTE — PROGRESS NOTES
Patient identification verified with 2 identifiers.    Location: [facility] - [place] [address] [place] [address] [phone] option #2     Referring Physician: Dr. Carlos Valencia   Enrollment/ Re-enrollment date: 2025  INR Goal: 2.0-3.0  INR monitoring is per AMS protocol.  Anticoagulation Medication: warfarin 3 mg tablets   Indication: Mitral Valve Replacement    Subjective   Bleeding signs/symptoms: No  Bruising: No   Major bleeding event: No  Thrombosis signs/symptoms: No  Thromboembolic event: No  Missed doses: No  Extra doses: No  Medication changes: No  Dietary changes: No  Change in health: No  Change in activity: No  Alcohol: No  Other concerns: No    Upcoming Procedures:  Does the Patient Have any upcoming procedures that require interruption in anticoagulation therapy? no  Does the patient require bridging? no      Anticoagulation Summary  As of 2024      INR goal:  2.0-3.0   TTR:  66.4% (1.1 y)   INR used for dosin.00 (2024)   Weekly warfarin total:  21 mg               Assessment/Plan   Therapeutic     1. New dose: no change    2. Next INR: 1 week      Education provided to patient during the visit:  Patient instructed to call in interim with questions, concerns and changes.   Patient educated on interactions between medications and warfarin.   Patient educated on dietary consistency in vitamin k consumption.   Patient educated on affects of alcohol consumption while taking warfarin.   Patient educated on signs of bleeding/clotting.   Patient educated on compliance with dosing, follow up appointments, and prescribed plan of care.

## 2024-11-13 ENCOUNTER — ANTICOAGULATION - WARFARIN VISIT (OUTPATIENT)
Dept: CARDIOLOGY | Facility: HOSPITAL | Age: 79
End: 2024-11-13
Payer: MEDICARE

## 2024-11-13 DIAGNOSIS — Z95.2 S/P MVR (MITRAL VALVE REPLACEMENT): Primary | ICD-10-CM

## 2024-11-13 LAB
POC INR: 1.9
POC PROTHROMBIN TIME: NORMAL

## 2024-11-13 PROCEDURE — 85610 PROTHROMBIN TIME: CPT | Mod: QW

## 2024-11-13 PROCEDURE — 99211 OFF/OP EST MAY X REQ PHY/QHP: CPT

## 2024-11-13 NOTE — PROGRESS NOTES
Patient identification verified with 2 identifiers.    Location: [facility] - [facility] [facility] [address] [place] [address] [phone] option #2      Referring Physician: Dr. Carlos Valencia   Enrollment/ Re-enrollment date: 2025  INR Goal: 2.0-3.0  INR monitoring is per AMS protocol.  Anticoagulation Medication: warfarin 3 mg tablets   Indication: Mitral Valve Replacement    Subjective   Bleeding signs/symptoms: No    Bruising: No   Major bleeding event: No  Thrombosis signs/symptoms: No  Thromboembolic event: No  Missed doses: Yes  pt missed a dose of warfarin  Extra doses: No  Medication changes: No  Dietary changes: No  Change in health: No  Change in activity: No  Alcohol: No  Other concerns: No    Upcoming Procedures:  Does the Patient Have any upcoming procedures that require interruption in anticoagulation therapy? no  Does the patient require bridging? no      Anticoagulation Summary  As of 2024      INR goal:  2.0-3.0   TTR:  65.2% (1.1 y)   INR used for dosin.90 (2024)   Weekly warfarin total:  21 mg               Assessment/Plan   Subtherapeutic     1. New dose: no change    2. Next INR: 1 week      Education provided to patient during the visit:  Patient instructed to call in interim with questions, concerns and changes.   Patient educated on interactions between medications and warfarin.   Patient educated on compliance with dosing, follow up appointments, and prescribed plan of care.

## 2024-11-20 ENCOUNTER — ANTICOAGULATION - WARFARIN VISIT (OUTPATIENT)
Dept: CARDIOLOGY | Facility: HOSPITAL | Age: 79
End: 2024-11-20
Payer: MEDICARE

## 2024-11-20 DIAGNOSIS — Z95.2 S/P MVR (MITRAL VALVE REPLACEMENT): Primary | ICD-10-CM

## 2024-11-20 LAB
POC INR: 2.8
POC PROTHROMBIN TIME: NORMAL

## 2024-11-20 PROCEDURE — 85610 PROTHROMBIN TIME: CPT | Mod: QW

## 2024-11-20 PROCEDURE — 99211 OFF/OP EST MAY X REQ PHY/QHP: CPT

## 2024-11-20 NOTE — PROGRESS NOTES
Patient identification verified with 2 identifiers.    Location: [facility] - [address] [facility] [address] [place] [address] [phone] option #2      Referring Physician: Dr. Carlos Valencia   Enrollment/ Re-enrollment date: 2025  INR Goal: 2.0-3.0  INR monitoring is per AMS protocol.  Anticoagulation Medication: warfarin 3 mg tablets   Indication: Mitral Valve Replacement    Subjective   Bleeding signs/symptoms: No    Bruising: No   Major bleeding event: No  Thrombosis signs/symptoms: No  Thromboembolic event: No  Missed doses: Yes    Extra doses: No  Medication changes: No  Dietary changes: No  Change in health: No  Change in activity: No  Alcohol: No  Other concerns: No    Upcoming Procedures:  Does the Patient Have any upcoming procedures that require interruption in anticoagulation therapy? no  Does the patient require bridging? no      Anticoagulation Summary  As of 2024      INR goal:  2.0-3.0   TTR:  65.6% (1.1 y)   INR used for dosin.80 (2024)   Weekly warfarin total:  21 mg               Assessment/Plan   Therapeutic     1. New dose: no change    2. Next INR: 2 weeks      Education provided to patient during the visit:  Patient instructed to call in interim with questions, concerns and changes.   Patient educated on compliance with dosing, follow up appointments, and prescribed plan of care.

## 2024-11-22 ENCOUNTER — APPOINTMENT (OUTPATIENT)
Dept: RADIOLOGY | Facility: CLINIC | Age: 79
End: 2024-11-22
Payer: MEDICARE

## 2024-12-04 ENCOUNTER — ANTICOAGULATION - WARFARIN VISIT (OUTPATIENT)
Dept: CARDIOLOGY | Facility: HOSPITAL | Age: 79
End: 2024-12-04
Payer: MEDICARE

## 2024-12-04 DIAGNOSIS — Z95.2 S/P MVR (MITRAL VALVE REPLACEMENT): Primary | ICD-10-CM

## 2024-12-04 LAB
POC INR: 2.3
POC PROTHROMBIN TIME: NORMAL

## 2024-12-04 PROCEDURE — 85610 PROTHROMBIN TIME: CPT | Mod: QW

## 2024-12-04 PROCEDURE — 99211 OFF/OP EST MAY X REQ PHY/QHP: CPT

## 2024-12-04 NOTE — PROGRESS NOTES
Patient identification verified with 2 identifiers.    Location: [facility] - [facility] [facility] [address] [place] [address] [phone] option #2      Referring Physician: Dr. Carlos Valencia   Enrollment/ Re-enrollment date: 2025  INR Goal: 2.0-3.0  INR monitoring is per AMS protocol.  Anticoagulation Medication: warfarin 3 mg tablets   Indication: Mitral Valve Replacement    Subjective   Bleeding signs/symptoms: No    Bruising: No   Major bleeding event: No  Thrombosis signs/symptoms: No  Thromboembolic event: No  Missed doses: No  Extra doses: No  Medication changes: No  Dietary changes: No  Change in health: No  Change in activity: No  Alcohol: No  Other concerns: No    Upcoming Procedures:  Does the Patient Have any upcoming procedures that require interruption in anticoagulation therapy? no  Does the patient require bridging? no      Anticoagulation Summary  As of 2024      INR goal:  2.0-3.0   TTR:  66.8% (1.1 y)   INR used for dosin.30 (2024)   Weekly warfarin total:  21 mg               Assessment/Plan   Therapeutic     1. New dose: no change    2. Next INR: 1 month      Education provided to patient during the visit:  Patient instructed to call in interim with questions, concerns and changes.

## 2024-12-12 ENCOUNTER — APPOINTMENT (OUTPATIENT)
Dept: PRIMARY CARE | Facility: CLINIC | Age: 79
End: 2024-12-12
Payer: MEDICARE

## 2024-12-25 DIAGNOSIS — I10 PRIMARY HYPERTENSION: ICD-10-CM

## 2024-12-25 DIAGNOSIS — E78.2 MIXED HYPERLIPIDEMIA: ICD-10-CM

## 2024-12-25 DIAGNOSIS — Z95.1 S/P CABG (CORONARY ARTERY BYPASS GRAFT): ICD-10-CM

## 2024-12-26 RX ORDER — SPIRONOLACTONE 25 MG/1
25 TABLET ORAL DAILY
Qty: 90 TABLET | Refills: 11 | Status: SHIPPED | OUTPATIENT
Start: 2024-12-26

## 2025-01-03 ENCOUNTER — ANTICOAGULATION - WARFARIN VISIT (OUTPATIENT)
Dept: CARDIOLOGY | Facility: HOSPITAL | Age: 80
End: 2025-01-03
Payer: MEDICARE

## 2025-01-03 ENCOUNTER — TELEPHONE (OUTPATIENT)
Dept: CARDIOLOGY | Facility: HOSPITAL | Age: 80
End: 2025-01-03
Payer: MEDICARE

## 2025-01-03 ENCOUNTER — APPOINTMENT (OUTPATIENT)
Dept: CARDIOLOGY | Facility: HOSPITAL | Age: 80
End: 2025-01-03
Payer: MEDICARE

## 2025-01-03 DIAGNOSIS — Z95.2 S/P MVR (MITRAL VALVE REPLACEMENT): Primary | ICD-10-CM

## 2025-01-06 ENCOUNTER — HOSPITAL ENCOUNTER (OUTPATIENT)
Dept: CARDIOLOGY | Facility: CLINIC | Age: 80
Discharge: HOME | End: 2025-01-06
Payer: MEDICARE

## 2025-01-06 DIAGNOSIS — Z95.0 PRESENCE OF CARDIAC PACEMAKER: ICD-10-CM

## 2025-01-06 DIAGNOSIS — I44.2 ATRIOVENTRICULAR BLOCK, COMPLETE (MULTI): ICD-10-CM

## 2025-01-06 PROCEDURE — 93294 REM INTERROG EVL PM/LDLS PM: CPT | Performed by: INTERNAL MEDICINE

## 2025-01-06 PROCEDURE — 93296 REM INTERROG EVL PM/IDS: CPT

## 2025-01-08 ENCOUNTER — ANTICOAGULATION - WARFARIN VISIT (OUTPATIENT)
Dept: CARDIOLOGY | Facility: HOSPITAL | Age: 80
End: 2025-01-08
Payer: MEDICARE

## 2025-01-08 ENCOUNTER — HOSPITAL ENCOUNTER (EMERGENCY)
Facility: HOSPITAL | Age: 80
Discharge: HOME | End: 2025-01-08
Payer: MEDICARE

## 2025-01-08 ENCOUNTER — TELEPHONE (OUTPATIENT)
Dept: CARDIOLOGY | Facility: HOSPITAL | Age: 80
End: 2025-01-08
Payer: MEDICARE

## 2025-01-08 ENCOUNTER — APPOINTMENT (OUTPATIENT)
Dept: RADIOLOGY | Facility: HOSPITAL | Age: 80
End: 2025-01-08
Payer: MEDICARE

## 2025-01-08 VITALS
SYSTOLIC BLOOD PRESSURE: 190 MMHG | TEMPERATURE: 97.9 F | HEIGHT: 65 IN | RESPIRATION RATE: 16 BRPM | BODY MASS INDEX: 24.32 KG/M2 | WEIGHT: 146 LBS | DIASTOLIC BLOOD PRESSURE: 74 MMHG | OXYGEN SATURATION: 97 % | HEART RATE: 81 BPM

## 2025-01-08 DIAGNOSIS — M25.551 BILATERAL HIP PAIN: Primary | ICD-10-CM

## 2025-01-08 DIAGNOSIS — S39.012A BACK STRAIN, INITIAL ENCOUNTER: ICD-10-CM

## 2025-01-08 DIAGNOSIS — M25.552 BILATERAL HIP PAIN: Primary | ICD-10-CM

## 2025-01-08 DIAGNOSIS — Z95.2 S/P MVR (MITRAL VALVE REPLACEMENT): Primary | ICD-10-CM

## 2025-01-08 LAB
APPEARANCE UR: ABNORMAL
APTT PPP: 37 SECONDS (ref 27–38)
BILIRUB UR STRIP.AUTO-MCNC: NEGATIVE MG/DL
COLOR UR: YELLOW
GLUCOSE UR STRIP.AUTO-MCNC: NORMAL MG/DL
HOLD SPECIMEN: NORMAL
HYALINE CASTS #/AREA URNS AUTO: ABNORMAL /LPF
INR PPP: 1.5 (ref 0.9–1.1)
KETONES UR STRIP.AUTO-MCNC: NEGATIVE MG/DL
LEUKOCYTE ESTERASE UR QL STRIP.AUTO: ABNORMAL
MUCOUS THREADS #/AREA URNS AUTO: ABNORMAL /LPF
NITRITE UR QL STRIP.AUTO: NEGATIVE
PH UR STRIP.AUTO: 5.5 [PH]
PROT UR STRIP.AUTO-MCNC: ABNORMAL MG/DL
PROTHROMBIN TIME: 17.4 SECONDS (ref 9.8–12.8)
RBC # UR STRIP.AUTO: ABNORMAL /UL
RBC #/AREA URNS AUTO: ABNORMAL /HPF
SP GR UR STRIP.AUTO: 1.03
SQUAMOUS #/AREA URNS AUTO: ABNORMAL /HPF
UROBILINOGEN UR STRIP.AUTO-MCNC: NORMAL MG/DL
WBC #/AREA URNS AUTO: ABNORMAL /HPF
YEAST BUDDING #/AREA UR COMP ASSIST: PRESENT /HPF

## 2025-01-08 PROCEDURE — 99284 EMERGENCY DEPT VISIT MOD MDM: CPT

## 2025-01-08 PROCEDURE — 85610 PROTHROMBIN TIME: CPT | Performed by: PHYSICIAN ASSISTANT

## 2025-01-08 PROCEDURE — 36415 COLL VENOUS BLD VENIPUNCTURE: CPT | Performed by: PHYSICIAN ASSISTANT

## 2025-01-08 PROCEDURE — 72100 X-RAY EXAM L-S SPINE 2/3 VWS: CPT

## 2025-01-08 PROCEDURE — 81001 URINALYSIS AUTO W/SCOPE: CPT | Performed by: PHYSICIAN ASSISTANT

## 2025-01-08 PROCEDURE — 2500000005 HC RX 250 GENERAL PHARMACY W/O HCPCS: Performed by: PHYSICIAN ASSISTANT

## 2025-01-08 PROCEDURE — 73521 X-RAY EXAM HIPS BI 2 VIEWS: CPT

## 2025-01-08 PROCEDURE — 72100 X-RAY EXAM L-S SPINE 2/3 VWS: CPT | Mod: FOREIGN READ | Performed by: RADIOLOGY

## 2025-01-08 PROCEDURE — 73523 X-RAY EXAM HIPS BI 5/> VIEWS: CPT | Mod: BILATERAL PROCEDURE | Performed by: RADIOLOGY

## 2025-01-08 PROCEDURE — 87086 URINE CULTURE/COLONY COUNT: CPT | Performed by: PHYSICIAN ASSISTANT

## 2025-01-08 RX ORDER — LIDOCAINE 560 MG/1
1 PATCH PERCUTANEOUS; TOPICAL; TRANSDERMAL DAILY
Status: DISCONTINUED | OUTPATIENT
Start: 2025-01-08 | End: 2025-01-08 | Stop reason: HOSPADM

## 2025-01-08 RX ORDER — HYDROCODONE BITARTRATE AND ACETAMINOPHEN 5; 325 MG/1; MG/1
1 TABLET ORAL EVERY 6 HOURS PRN
Qty: 12 TABLET | Refills: 0 | Status: SHIPPED | OUTPATIENT
Start: 2025-01-08 | End: 2025-01-08

## 2025-01-08 RX ORDER — HYDROCODONE BITARTRATE AND ACETAMINOPHEN 5; 325 MG/1; MG/1
1 TABLET ORAL EVERY 6 HOURS PRN
Qty: 12 TABLET | Refills: 0 | Status: SHIPPED | OUTPATIENT
Start: 2025-01-08 | End: 2025-01-11

## 2025-01-08 RX ADMIN — LIDOCAINE 4% 1 PATCH: 40 PATCH TOPICAL at 12:01

## 2025-01-08 ASSESSMENT — COLUMBIA-SUICIDE SEVERITY RATING SCALE - C-SSRS
1. IN THE PAST MONTH, HAVE YOU WISHED YOU WERE DEAD OR WISHED YOU COULD GO TO SLEEP AND NOT WAKE UP?: NO
2. HAVE YOU ACTUALLY HAD ANY THOUGHTS OF KILLING YOURSELF?: NO
6. HAVE YOU EVER DONE ANYTHING, STARTED TO DO ANYTHING, OR PREPARED TO DO ANYTHING TO END YOUR LIFE?: NO

## 2025-01-08 ASSESSMENT — PAIN SCALES - GENERAL: PAINLEVEL_OUTOF10: 7

## 2025-01-08 ASSESSMENT — PAIN - FUNCTIONAL ASSESSMENT: PAIN_FUNCTIONAL_ASSESSMENT: 0-10

## 2025-01-08 NOTE — ED PROVIDER NOTES
HPI   Chief Complaint   Patient presents with    Fall       HPI This is a 79-year-old female who presents with bilateral hip pain left greater than right and low back pain after she got twisted in bed sheets and tripped forward about a week ago.  She was coming to get her blood drawn for Coumadin clinic but has been at her daughter's which has stairs which has been aggravating her hip pain therefore she presents here first.  No nausea or vomiting however she states she is really not drinking so much water she is concerned maybe she has a UTI as well.  No cough or cold no bowel changes no real bladder changes that she is aware of.  No chest pain or shortness of breath no head injury no loss of consciousness        Patient History   Past Medical History:   Diagnosis Date    Anticoagulated     Atrial flutter (Multi)     CAD (coronary artery disease)     s/p CABG 2019    Cardiac pacemaker in situ 2019    [facility]    Cardiomyopathy     Difficult intubation 2019    Easy mask, mac 3 x2 unsuccessful attempts.  Glidescope #3, grade 1 view, 1 attempt.    Diverticulitis     GERD (gastroesophageal reflux disease)     HTN (hypertension)     Mitral valve disorder     s/p MV and TV repair 2019     Past Surgical History:   Procedure Laterality Date    CARDIAC ELECTROPHYSIOLOGY MAPPING AND ABLATION  10/2019    CARDIAC PACEMAKER PLACEMENT  09/19/2019    [facility]    COLONOSCOPY      CORONARY ARTERY BYPASS GRAFT  09/11/2019    Left internal mammary to left     anterior descending; sequential saphenous vein to marginal 1     and diagonal 1.    KNEE ARTHROSCOPY W/ DEBRIDEMENT      MITRAL VALVE REPAIR  09/11/2019    Radical repair of mitral valve: Posterior leaflet chordae,     #29 ATS ring.    TRICUSPID VALVE SURGERY  09/11/2019    Tricuspid valve repair: #29 ATS ring.    TUBAL LIGATION       Family History   Problem Relation Name Age of Onset    Diabetes Mother      Hypertension Father       Social History     Tobacco Use    Smoking  status: Former     Current packs/day: 0.00     Types: Cigarettes     Quit date:      Years since quittin.0    Smokeless tobacco: Never   Vaping Use    Vaping status: Not on file   Substance Use Topics    Alcohol use: Not Currently    Drug use: Yes     Types: Marijuana       Physical Exam   ED Triage Vitals this   Temperature Heart Rate Respirations BP   36.6 °C (97.9 °F) 81 16 (!) 190/74      Pulse Ox Temp Source Heart Rate Source Patient Position   97 % Temporal -- --      BP Location FiO2 (%)     -- --       Physical Exam    Reviewed family history social history and allergies and were noncontributory to current problem.    Review of systems as noted in history of present illness  otherwise negative. All other systems were reviewed and negative.     PMHX: Chronic conditions: reviewd in EMR, relevant history noted in HPI                Surgeries, hospitalizations: reviewed in EMR , relevant history noted in HPI                Medications: reviewed in EMR, relevant history noted in HPI                Allergies: reviewed in EMR, relevant history noted in HPI      PHYSICAL EXAM:    GENERAL/ CONSTITUTIONAL: Vitals noted, no distress. Alert and oriented  x 3. Non-toxic.  No Drooling or stridor .    HEAD: Normocephalic Atraumatic    EENT: TMs clear. Posterior oropharynx unremarkable. No meningismus. No LAD.  No exudate present.      EYES: PERRLA EOMI     NECK: Supple. Nontender. No midline tenderness.  Full range of motion    CARDIAC: Regular, rate, rhythm. No murmurs rubs or gallops. No JVD    PULMONARY: Lungs clear bilaterally with good aeration. No wheezes rales or rhonchi. No respiratory distress.     GI: Soft, . Nontender. No peritoneal signs. Normoactive bowel sounds. No pulsatile masses.  No guarding or rebound    EXTREMITIES/MUSCULOSKELTAL: No peripheral edema. Negative Homans bilaterally, NVIT, pulses +2 /4 equal. FROM in all extremities and equal.  There is tenderness on the low paraspinal area as well  as bilateral hip area.  Patient has increased pain with movement from seated to standing position.  She has no weakness but has tenderness in the bilateral hip area as noted.  Full range of motion at knee joint.  No bruising noted.    SKIN: No rash. Intact.     NEURO: No focal neurologic deficits,     PSYCH: appropriate mood and affect    MEDICAL DECISION MAKING:      ED Course & MDM   ED Course as of 01/08/25 1116   Wed Jan 08, 2025   0936 No significant listhesis.  No significant vertebral body height loss.  Multilevel disc space narrowing is moderate at L5-S1. There is vacuum  phenomenon at this.  Osteopenia.   IMPRESSION:  No acute finding.   [CV]   0936 There is no displaced fracture.  The alignment is anatomic.  No soft  tissue abnormality is seen.  RIGHT HIP:  There is no displaced fracture.  The alignment is anatomic.  No soft  tissue abnormality is seen.  IMPRESSION:  No acute fracture or malalignment.  Signed by Chris Madison DO   [CV]      ED Course User Index  [CV] Erin Becerra PA-C         Diagnoses as of 01/08/25 1116   Bilateral hip pain   Back strain, initial encounter   Urinalysis ordered pain medicine given did do a blood draw for her Coumadin.  Imaging ordered of her bilateral hip and pelvis as well as back    Imaging unremarkable for any bony process.  Urinalysis unremarkable for any infection.  INR 1.5 please let Coumadin clinic know              No data recorded                                 Medical Decision Making  Home-going on pain medicine    Procedure  Procedures     Erin Becerra PA-C  01/08/25 1116

## 2025-01-08 NOTE — PROGRESS NOTES
Patient identification verified with 2 identifiers.    Location: [facility] - [facility] [facility] [address] [address] [place] [address] [phone] option #2   INR RESULT FROM LAB DRAW IN ED     Referring Physician: Dr. Carlos Valencia   Enrollment/ Re-enrollment date: 2025  INR Goal: 2.0-3.0  INR monitoring is per AMS protocol.  Anticoagulation Medication: warfarin 3 mg tablets   Indication: Mitral Valve Replacement    Subjective   Bleeding signs/symptoms: No  Bruising: No   Major bleeding event: No  Thrombosis signs/symptoms: No  Thromboembolic event: No  Missed doses: Yes  missed at least one dose over the weekend  Extra doses: No  Medication changes: No  Dietary changes: Yes  not eating much due to pain from fall  Change in health: No  Change in activity: No  Alcohol: No  Other concerns: No    Upcoming Procedures:  Does the Patient Have any upcoming procedures that require interruption in anticoagulation therapy? no  Does the patient require bridging? no      Anticoagulation Summary  As of 2025      INR goal:  2.0-3.0   TTR:  64.5% (1.2 y)   INR used for dosin.5 (2025)   Weekly warfarin total:  21 mg               Assessment/Plan   Subtherapeutic after missing at least one dose over the weekend    1. New dose: no change    2. Next INR:  2 days      Education provided to patient during the visit:  Patient instructed to call in interim with questions, concerns and changes.   Patient educated on compliance with dosing, follow up appointments, and prescribed plan of care.
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
## 2025-01-09 LAB — BACTERIA UR CULT: NORMAL

## 2025-01-10 ENCOUNTER — ANTICOAGULATION - WARFARIN VISIT (OUTPATIENT)
Dept: CARDIOLOGY | Facility: HOSPITAL | Age: 80
End: 2025-01-10
Payer: MEDICARE

## 2025-01-10 ENCOUNTER — APPOINTMENT (OUTPATIENT)
Dept: ORTHOPEDIC SURGERY | Facility: HOSPITAL | Age: 80
End: 2025-01-10
Payer: MEDICARE

## 2025-01-10 ENCOUNTER — APPOINTMENT (OUTPATIENT)
Dept: CARDIOLOGY | Facility: HOSPITAL | Age: 80
End: 2025-01-10
Payer: MEDICARE

## 2025-01-10 ENCOUNTER — TELEPHONE (OUTPATIENT)
Dept: CARDIOLOGY | Facility: HOSPITAL | Age: 80
End: 2025-01-10
Payer: MEDICARE

## 2025-01-10 ENCOUNTER — APPOINTMENT (OUTPATIENT)
Dept: CARDIOLOGY | Facility: CLINIC | Age: 80
End: 2025-01-10
Payer: MEDICARE

## 2025-01-10 DIAGNOSIS — Z95.2 S/P MVR (MITRAL VALVE REPLACEMENT): Primary | ICD-10-CM

## 2025-01-10 NOTE — TELEPHONE ENCOUNTER
Pt left voicemail stating she needs to cancel today's clinic appointment because she is in too much pain and does not have help getting to clinic today. R/s for 1/15 per pt availability.

## 2025-01-15 ENCOUNTER — ANTICOAGULATION - WARFARIN VISIT (OUTPATIENT)
Dept: CARDIOLOGY | Facility: HOSPITAL | Age: 80
End: 2025-01-15
Payer: MEDICARE

## 2025-01-15 DIAGNOSIS — Z95.2 S/P MVR (MITRAL VALVE REPLACEMENT): Primary | ICD-10-CM

## 2025-01-15 LAB
POC INR: 3.5
POC PROTHROMBIN TIME: NORMAL

## 2025-01-15 PROCEDURE — 85610 PROTHROMBIN TIME: CPT | Mod: QW

## 2025-01-15 NOTE — PROGRESS NOTES
Patient identification verified with 2 identifiers.    Location: [facility] - [address] [facility] [address] [address] [place] [address] [phone] option #2   INR RESULT FROM LAB DRAW IN ED     Referring Physician: Dr. Carlos Valencia   Enrollment/ Re-enrollment date: 8/9/2025  INR Goal: 2.0-3.0  INR monitoring is per AMS protocol.  Anticoagulation Medication: warfarin 3 mg tablets   Indication: Mitral Valve Replacement    Subjective   Bleeding signs/symptoms: No    Bruising: No   Major bleeding event: No  Thrombosis signs/symptoms: No  Thromboembolic event: No  Missed doses: No  Extra doses: No  Medication changes: Yes  Patient is taking Vicodin daily for back pain.  Dietary changes: No  Change in health: No  Change in activity: No  Alcohol: No  Other concerns: No    Upcoming Procedures:  Does the Patient Have any upcoming procedures that require interruption in anticoagulation therapy? no  Does the patient require bridging? no      Anticoagulation Summary  As of 1/15/2025      INR goal:  2.0-3.0   TTR:  64.3% (1.3 y)   INR used for dosing:  3.50 (1/15/2025)   Weekly warfarin total:  19.5 mg               Assessment/Plan   Supratherapeutic    1. New dose: Will hold today's dose, decrease weekly dose and patient will return in 1 week    2. Next INR: 1 week      Education provided to patient during the visit:  Patient instructed to call in interim with questions, concerns and changes.

## 2025-01-16 ENCOUNTER — APPOINTMENT (OUTPATIENT)
Dept: RADIOLOGY | Facility: HOSPITAL | Age: 80
End: 2025-01-16
Payer: MEDICARE

## 2025-01-16 ENCOUNTER — HOSPITAL ENCOUNTER (EMERGENCY)
Facility: HOSPITAL | Age: 80
Discharge: HOME | End: 2025-01-16
Attending: INTERNAL MEDICINE
Payer: MEDICARE

## 2025-01-16 ENCOUNTER — APPOINTMENT (OUTPATIENT)
Dept: CARDIOLOGY | Facility: HOSPITAL | Age: 80
End: 2025-01-16
Payer: MEDICARE

## 2025-01-16 VITALS
HEART RATE: 60 BPM | DIASTOLIC BLOOD PRESSURE: 58 MMHG | RESPIRATION RATE: 12 BRPM | TEMPERATURE: 98.9 F | OXYGEN SATURATION: 99 % | SYSTOLIC BLOOD PRESSURE: 129 MMHG

## 2025-01-16 DIAGNOSIS — K57.92 DIVERTICULITIS: ICD-10-CM

## 2025-01-16 DIAGNOSIS — R07.9 CHEST PAIN, UNSPECIFIED TYPE: Primary | ICD-10-CM

## 2025-01-16 LAB
ALBUMIN SERPL BCP-MCNC: 4.3 G/DL (ref 3.4–5)
ALP SERPL-CCNC: 91 U/L (ref 33–136)
ALT SERPL W P-5'-P-CCNC: 8 U/L (ref 7–45)
ANION GAP SERPL CALC-SCNC: 13 MMOL/L (ref 10–20)
AST SERPL W P-5'-P-CCNC: 16 U/L (ref 9–39)
ATRIAL RATE: 71 BPM
BASOPHILS # BLD AUTO: 0.08 X10*3/UL (ref 0–0.1)
BASOPHILS NFR BLD AUTO: 0.5 %
BILIRUB SERPL-MCNC: 0.6 MG/DL (ref 0–1.2)
BNP SERPL-MCNC: 112 PG/ML (ref 0–99)
BUN SERPL-MCNC: 15 MG/DL (ref 6–23)
CALCIUM SERPL-MCNC: 10.2 MG/DL (ref 8.6–10.3)
CARDIAC TROPONIN I PNL SERPL HS: 10 NG/L (ref 0–13)
CARDIAC TROPONIN I PNL SERPL HS: 12 NG/L (ref 0–13)
CHLORIDE SERPL-SCNC: 105 MMOL/L (ref 98–107)
CO2 SERPL-SCNC: 25 MMOL/L (ref 21–32)
CREAT SERPL-MCNC: 0.76 MG/DL (ref 0.5–1.05)
EGFRCR SERPLBLD CKD-EPI 2021: 81 ML/MIN/1.73M*2
EOSINOPHIL # BLD AUTO: 0.04 X10*3/UL (ref 0–0.4)
EOSINOPHIL NFR BLD AUTO: 0.2 %
ERYTHROCYTE [DISTWIDTH] IN BLOOD BY AUTOMATED COUNT: 13.2 % (ref 11.5–14.5)
GLUCOSE SERPL-MCNC: 113 MG/DL (ref 74–99)
HCT VFR BLD AUTO: 42.6 % (ref 36–46)
HGB BLD-MCNC: 13.9 G/DL (ref 12–16)
IMM GRANULOCYTES # BLD AUTO: 0.08 X10*3/UL (ref 0–0.5)
IMM GRANULOCYTES NFR BLD AUTO: 0.5 % (ref 0–0.9)
INR PPP: 2.7 (ref 0.9–1.1)
LYMPHOCYTES # BLD AUTO: 3.45 X10*3/UL (ref 0.8–3)
LYMPHOCYTES NFR BLD AUTO: 21.1 %
MAGNESIUM SERPL-MCNC: 1.55 MG/DL (ref 1.6–2.4)
MCH RBC QN AUTO: 26.2 PG (ref 26–34)
MCHC RBC AUTO-ENTMCNC: 32.6 G/DL (ref 32–36)
MCV RBC AUTO: 80 FL (ref 80–100)
MONOCYTES # BLD AUTO: 0.85 X10*3/UL (ref 0.05–0.8)
MONOCYTES NFR BLD AUTO: 5.2 %
NEUTROPHILS # BLD AUTO: 11.88 X10*3/UL (ref 1.6–5.5)
NEUTROPHILS NFR BLD AUTO: 72.5 %
NRBC BLD-RTO: 0 /100 WBCS (ref 0–0)
P AXIS: 88 DEGREES
P OFFSET: 203 MS
P ONSET: 159 MS
PLATELET # BLD AUTO: 215 X10*3/UL (ref 150–450)
POTASSIUM SERPL-SCNC: 3.6 MMOL/L (ref 3.5–5.3)
PR INTERVAL: 120 MS
PROT SERPL-MCNC: 8 G/DL (ref 6.4–8.2)
PROTHROMBIN TIME: 30.8 SECONDS (ref 9.8–12.8)
Q ONSET: 219 MS
QRS COUNT: 12 BEATS
QRS DURATION: 90 MS
QT INTERVAL: 410 MS
QTC CALCULATION(BAZETT): 445 MS
QTC FREDERICIA: 433 MS
R AXIS: 57 DEGREES
RBC # BLD AUTO: 5.3 X10*6/UL (ref 4–5.2)
SODIUM SERPL-SCNC: 139 MMOL/L (ref 136–145)
T AXIS: 73 DEGREES
T OFFSET: 424 MS
VENTRICULAR RATE: 71 BPM
WBC # BLD AUTO: 16.4 X10*3/UL (ref 4.4–11.3)

## 2025-01-16 PROCEDURE — 96367 TX/PROPH/DG ADDL SEQ IV INF: CPT

## 2025-01-16 PROCEDURE — 2500000004 HC RX 250 GENERAL PHARMACY W/ HCPCS (ALT 636 FOR OP/ED): Performed by: INTERNAL MEDICINE

## 2025-01-16 PROCEDURE — 74174 CTA ABD&PLVS W/CONTRAST: CPT | Performed by: RADIOLOGY

## 2025-01-16 PROCEDURE — 83735 ASSAY OF MAGNESIUM: CPT | Performed by: INTERNAL MEDICINE

## 2025-01-16 PROCEDURE — 84484 ASSAY OF TROPONIN QUANT: CPT | Performed by: INTERNAL MEDICINE

## 2025-01-16 PROCEDURE — 99285 EMERGENCY DEPT VISIT HI MDM: CPT | Mod: 25 | Performed by: INTERNAL MEDICINE

## 2025-01-16 PROCEDURE — 96365 THER/PROPH/DIAG IV INF INIT: CPT

## 2025-01-16 PROCEDURE — 93005 ELECTROCARDIOGRAM TRACING: CPT

## 2025-01-16 PROCEDURE — 85610 PROTHROMBIN TIME: CPT | Performed by: INTERNAL MEDICINE

## 2025-01-16 PROCEDURE — 36415 COLL VENOUS BLD VENIPUNCTURE: CPT | Performed by: INTERNAL MEDICINE

## 2025-01-16 PROCEDURE — 71046 X-RAY EXAM CHEST 2 VIEWS: CPT | Mod: FOREIGN READ | Performed by: RADIOLOGY

## 2025-01-16 PROCEDURE — 96366 THER/PROPH/DIAG IV INF ADDON: CPT

## 2025-01-16 PROCEDURE — 2550000001 HC RX 255 CONTRASTS: Performed by: INTERNAL MEDICINE

## 2025-01-16 PROCEDURE — 83880 ASSAY OF NATRIURETIC PEPTIDE: CPT | Performed by: INTERNAL MEDICINE

## 2025-01-16 PROCEDURE — 71046 X-RAY EXAM CHEST 2 VIEWS: CPT

## 2025-01-16 PROCEDURE — 71275 CT ANGIOGRAPHY CHEST: CPT | Performed by: RADIOLOGY

## 2025-01-16 PROCEDURE — 71275 CT ANGIOGRAPHY CHEST: CPT

## 2025-01-16 PROCEDURE — 85025 COMPLETE CBC W/AUTO DIFF WBC: CPT | Performed by: INTERNAL MEDICINE

## 2025-01-16 PROCEDURE — 80053 COMPREHEN METABOLIC PANEL: CPT | Performed by: INTERNAL MEDICINE

## 2025-01-16 RX ORDER — AMOXICILLIN AND CLAVULANATE POTASSIUM 875; 125 MG/1; MG/1
1 TABLET, FILM COATED ORAL EVERY 12 HOURS
Qty: 14 TABLET | Refills: 0 | Status: SHIPPED | OUTPATIENT
Start: 2025-01-16 | End: 2025-01-23

## 2025-01-16 RX ORDER — MAGNESIUM SULFATE HEPTAHYDRATE 40 MG/ML
2 INJECTION, SOLUTION INTRAVENOUS ONCE
Status: COMPLETED | OUTPATIENT
Start: 2025-01-16 | End: 2025-01-16

## 2025-01-16 RX ADMIN — MAGNESIUM SULFATE HEPTAHYDRATE 2 G: 40 INJECTION, SOLUTION INTRAVENOUS at 13:45

## 2025-01-16 RX ADMIN — IOHEXOL 90 ML: 350 INJECTION, SOLUTION INTRAVENOUS at 14:41

## 2025-01-16 RX ADMIN — PIPERACILLIN SODIUM AND TAZOBACTAM SODIUM 3.38 G: 3; .375 INJECTION, SOLUTION INTRAVENOUS at 18:03

## 2025-01-16 ASSESSMENT — PAIN DESCRIPTION - DESCRIPTORS: DESCRIPTORS: PRESSURE

## 2025-01-16 ASSESSMENT — HEART SCORE
ECG: NORMAL
RISK FACTORS: >2 RISK FACTORS OR HX OF ATHEROSCLEROTIC DISEASE
TROPONIN: LESS THAN OR EQUAL TO NORMAL LIMIT
AGE: 65+
HISTORY: SLIGHTLY SUSPICIOUS
HEART SCORE: 4

## 2025-01-16 ASSESSMENT — PAIN SCALES - GENERAL
PAINLEVEL_OUTOF10: 1
PAINLEVEL_OUTOF10: 0 - NO PAIN

## 2025-01-16 ASSESSMENT — PAIN - FUNCTIONAL ASSESSMENT: PAIN_FUNCTIONAL_ASSESSMENT: 0-10

## 2025-01-16 NOTE — ED TRIAGE NOTES
Pt to ed from home for chest pain this morning at around 10am. Pt took 81mg aspirin and then 500mg aspirin and now states the pain is much better. Hx open heart surgery, cabbage, ablasion, pacemaker. Pt takes coumadin. Denies n/v/sob

## 2025-01-16 NOTE — DISCHARGE INSTRUCTIONS
You were seen today for chest and abdominal discomfort.  Your evaluation was not concerning for an emergency at this time.  You may have a very mild case of diverticulitis.  We gave you a prescription for antibiotics for this.  Follow-up with your cardiologist this week about your chest pain. Please see the attached information sheet for information about your condition, how to care for your condition at home, and reasons to return to the emergency department. Take any prescriptions written today as prescribed. You should call your primary care provider within 24 hours to tell them about today's visit, including any new medications or medication changes, as he or she may want to see you in the office for further evaluation. If you do not have a primary care provider, call  [phone] for an appointment. We offer in-person office visits as well as virtual options. Please do not hesitate to call  [phone] or return to the emergency department with any new or unresolved concerns or symptoms. Thank you for choosing [facility] for your care.

## 2025-01-16 NOTE — ED PROVIDER NOTES
HPI     CC: Chest Pain     HPI: Neha Tsang is a 79 y.o. 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  Patient states that yesterday she was walking a lot around the hospital going to her Coumadin clinic when she developed worsening of her chronic left pacemaker site pain radiating down her arm.  It lasted a few hours and abated with rest.  Today she was cooking in the kitchen around 930 to 10 AM when she developed recurrence of this pain over her pacemaker going down the arm as well as new pressure in the upper substernal region.  She had associated shortness of breath, left-sided abdominal pain, and tremulousness at the time.  It felt like her prior diverticulitis.  She thought she might be having a heart attack so she called EMS.  They advised her to take 500 mg of aspirin in addition to the ED when she had already taken.  The pressure over her chest resolved but she still feels pain over her pacemaker site going down the arm which worsens with exertion.  She states she has never had a heart attack before but is s/p CABG.    ROS: 10-point review of systems was performed and is otherwise negative except as noted in HPI.    Limitations to history: N/A    Independent Historians: N/A    External Records Reviewed: Outpatient notes in EMR     Past Medical History: Noncontributory except per HPI     Past Surgical History: Noncontributory except per HPI     Family History: Reviewed and noncontributory     Social History:  Denies tobacco. Denies ETOH. Denies illicit drugs.    Social Determinants Affecting Care: N/A     Allergies   Allergen Reactions    Diethyltoluamide Swelling    Pneumococcal 13-Valent Conjugate To Diphtheria Crm Hives and Other     Fever    Shellfish Containing Products Swelling     Facial swelling, throat walling    Shellfish Derived Unknown    Sulfa (Sulfonamide Antibiotics)  Hiv       Home Meds:   Current Outpatient Medications   Medication Instructions    acetaminophen (Tylenol) 325 mg tablet oral, Every 4 hours PRN    amLODIPine (NORVASC) 10 mg, oral, Daily    amoxicillin-pot clavulanate (Augmentin) 875-125 mg tablet 1 tablet, oral, Every 12 hours    aspirin 81 mg EC tablet oral    atorvastatin (LIPITOR) 40 mg, oral, Daily    cholecalciferol (Vitamin D-3) 125 MCG (5000 UT) capsule oral    ferrous sulfate 325 (65 Fe) MG tablet 65 mg of iron, oral    gabapentin (NEURONTIN) 300 mg, oral, Nightly    lisinopril 30 mg, oral, Daily    magnesium oxide (Mag-Ox) 400 mg (241.3 mg magnesium) tablet 1 tablet, oral, Daily    metoprolol tartrate (LOPRESSOR) 50 mg, oral, Every 12 hours    spironolactone (ALDACTONE) 25 mg, oral, Daily    warfarin (COUMADIN) 3 mg, oral, Every evening, Take as directed        Physical Exam     ED Triage Vitals [01/16/25 1158]   Temperature Heart Rate Respirations BP   37.2 °C (98.9 °F) 63 13 (!) 181/80      Pulse Ox Temp Source Heart Rate Source Patient Position   99 % Temporal Monitor Lying      BP Location FiO2 (%)     Right arm --         Heart Rate:  [60-74]   Temperature:  [37.2 °C (98.9 °F)]   Respirations:  [13-17]   BP: (131-181)/(64-80)   Pulse Ox:  [99 %]      Physical Exam  Vitals and nursing note reviewed.     CONSTITUTIONAL: Well appearing, well nourished, elderly female in no acute distress.   HENMT: Head atraumatic. Airway patent. Nasal mucosa clear. Mouth with normal mucosa, clear oropharynx. Uvula midline. Neck supple.    EYES: Clear bilaterally, pupils equally round and reactive to light.   CARDIOVASCULAR: PPM L upper chest wall without abnormality. Normal rate, regular rhythm.  Heart sounds S1, S2.  No murmurs, rubs or gallops. Normal pulses. Capillary refill < 2 sec.   RESPIRATORY: No increased work of breathing. Breath sounds clear and equal bilaterally.  GASTROINTESTINAL: Abdomen soft, non-distended, non-tender. No rebound, no guarding. Normal  bowel sounds. No palpable masses.  GENITOURINARY:  No CVA tenderness.  MUSCULOSKELETAL: Spine appears normal, range of motion is not limited, no muscle or joint tenderness. No edema.   NEUROLOGICAL: Alert and oriented, no asymmetry, moving all extremities equally.  SKIN: Warm, dry and intact. No rash or notable lesions.  PSYCHIATRIC: Normal mood and affect.  HEME/LYMPH: No adenopathy or splenomegaly.    Diagnostic Results      ECG: ECGs read and interpreted by me. See ED Course, below, for interpretation.    Labs Reviewed   CBC WITH AUTO DIFFERENTIAL - Abnormal       Result Value    WBC 16.4 (*)     nRBC 0.0      RBC 5.30 (*)     Hemoglobin 13.9      Hematocrit 42.6      MCV 80      MCH 26.2      MCHC 32.6      RDW 13.2      Platelets 215      Neutrophils % 72.5      Immature Granulocytes %, Automated 0.5      Lymphocytes % 21.1      Monocytes % 5.2      Eosinophils % 0.2      Basophils % 0.5      Neutrophils Absolute 11.88 (*)     Immature Granulocytes Absolute, Automated 0.08      Lymphocytes Absolute 3.45 (*)     Monocytes Absolute 0.85 (*)     Eosinophils Absolute 0.04      Basophils Absolute 0.08     COMPREHENSIVE METABOLIC PANEL - Abnormal    Glucose 113 (*)     Sodium 139      Potassium 3.6      Chloride 105      Bicarbonate 25      Anion Gap 13      Urea Nitrogen 15      Creatinine 0.76      eGFR 81      Calcium 10.2      Albumin 4.3      Alkaline Phosphatase 91      Total Protein 8.0      AST 16      Bilirubin, Total 0.6      ALT 8     B-TYPE NATRIURETIC PEPTIDE - Abnormal     (*)     Narrative:        <100 pg/mL - Heart failure unlikely  100-299 pg/mL - Intermediate probability of acute heart                  failure exacerbation. Correlate with clinical                  context and patient history.    >=300 pg/mL - Heart Failure likely. Correlate with clinical                  context and patient history.    BNP testing is performed using different testing methodology at [facility]  than at other system hospitals. Direct result comparisons should only be made within the same method.      MAGNESIUM - Abnormal    Magnesium 1.55 (*)    PROTIME-INR - Abnormal    Protime 30.8 (*)     INR 2.7 (*)    SERIAL TROPONIN-INITIAL - Normal    Troponin I, High Sensitivity 10      Narrative:     Less than 99th percentile of normal range cutoff-  Female and children under 18 years old <14 ng/L; Male <21 ng/L: Negative  Repeat testing should be performed if clinically indicated.     Female and children under 18 years old 14-50 ng/L; Male 21-50 ng/L:  Consistent with possible cardiac damage and possible increased clinical   risk. Serial measurements may help to assess extent of myocardial damage.     >50 ng/L: Consistent with cardiac damage, increased clinical risk and  myocardial infarction. Serial measurements may help assess extent of   myocardial damage.      NOTE: Children less than 1 year old may have higher baseline troponin   levels and results should be interpreted in conjunction with the overall   clinical context.     NOTE: Troponin I testing is performed using a different   testing methodology at [facility] than at other   [facility]. Direct result comparisons should only   be made within the same method.   SERIAL TROPONIN, 1 HOUR - Normal    Troponin I, High Sensitivity 12      Narrative:     Less than 99th percentile of normal range cutoff-  Female and children under 18 years old <14 ng/L; Male <21 ng/L: Negative  Repeat testing should be performed if clinically indicated.     Female and children under 18 years old 14-50 ng/L; Male 21-50 ng/L:  Consistent with possible cardiac damage and possible increased clinical   risk. Serial measurements may help to assess extent of myocardial damage.     >50 ng/L: Consistent with cardiac damage, increased clinical risk and  myocardial infarction. Serial measurements may help assess extent of   myocardial damage.      NOTE: Children less  than 1 year old may have higher baseline troponin   levels and results should be interpreted in conjunction with the overall   clinical context.     NOTE: Troponin I testing is performed using a different   testing methodology at [facility] than at other   [facility]. Direct result comparisons should only   be made within the same method.   TROPONIN SERIES- (INITIAL, 1 HR)    Narrative:     The following orders were created for panel order Troponin I Series, High Sensitivity (0, 1 HR).  Procedure                               Abnormality         Status                     ---------                               -----------         ------                     Troponin I, High Sensiti...[[id number]]  Normal              Final result               Troponin, High Sensitivi...[[id number]]  Normal              Final result                 Please view results for these tests on the individual orders.   URINALYSIS WITH REFLEX CULTURE AND MICROSCOPIC    Narrative:     The following orders were created for panel order Urinalysis with Reflex Culture and Microscopic.  Procedure                               Abnormality         Status                     ---------                               -----------         ------                     Urinalysis with Reflex C...[[id number]]                                                 Extra Urine Gray Tube[[id number]]                                                         Please view results for these tests on the individual orders.   URINALYSIS WITH REFLEX CULTURE AND MICROSCOPIC   EXTRA URINE GRAY TUBE         CT angio chest abdomen pelvis   Final Result   No thoracic or abdominal aortic aneurysm or dissection.        No acute abnormality of the chest, abdomen or pelvis.        Similar chronic abnormality involving the sigmoid colon likely   related to diverticulosis. Low-level diverticulitis not excluded in   the appropriate clinical setting. Recommend correlation with    symptomatology.             Signed by: Michael Ash 1/16/2025 4:42 PM   Dictation workstation:   [id number]      XR chest 2 views   Final Result   No acute findings on two-view chest.   Signed by Oscar Rodriguez MD                    Liane Coma Scale Score: 15   HEART Score: 4                Procedure  Procedures    ED Course & MDM   Assessment/Plan:   Neha Tsang is a 79 y.o. 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  Patient states she has chronic pain over her left pacemaker site going down the arm but that yesterday it became worse with exertion.  Today while cooking in the morning she also developed sudden onset of upper substernal chest pressure making her think she might be having a heart attack with associated shortness of breath, abdominal pain, and tremulousness.  She is notably hypertensive, otherwise hemodynamically stable and well-appearing.  Differential includes ACS, stable angina, occult infection or metabolic process. Abdominal exam is benign; She did have some L sided abdominal pain at the time which is now resolved. Workup was initiated with ECG, labs, and CXR.  See below for details of ED course and ultimate disposition.    Medications   piperacillin-tazobactam (Zosyn) 3.375 g in dextrose (iso) IV 50 mL (has no administration in time range)   magnesium sulfate 2 g in sterile water for injection 50 mL (2 g intravenous New Bag 1/16/25 1345)   iohexol (OMNIPaque) 350 mg iodine/mL solution 90 mL (90 mL intravenous Given 1/16/25 1441)        ED Course as of 01/16/25 1707   Thu Jan 16, 2025   1209 ECG read and interpreted by me.  Normal sinus rhythm, rate 71.  Normal axis.  Normal intervals.  No ST or T wave derangements [CG]   1315 XR unremarkable [CG]   1315 Labs notable for CBC with leukocytosis 16.4, normal H&H, normal platelets, normal CMP, troponin.  INR is  therapeutic at 2.7.  Magnesium is low at 1.55.  This was repleted. [CG]   1327 BNP mildly elevated 112 [CG]   1417 Repeat troponin negative [CG]   1700 CTA no PE or dissection. Similar chronic abnormality involving the sigmoid colon likely  related to diverticulosis. Low-level diverticulitis not excluded in the appropriate clinical setting. Recommend correlation with symptomatology. [CG]   1701 Patient was reevaluated. Her chest discomfort is completely gone.  She was offered observation admission for her chest pain given her HEART score of 4 but she would prefer to go home.  She feels comfortable going home. She received a dose of zosyn in the ED for possible diverticulitis. Will DC on Augmentin.  Patient was discharged home with anticipatory guidance and strict return precautions. [CG]      ED Course User Index  [CG] Roxann Meza MD         Diagnoses as of 01/16/25 1707   Chest pain, unspecified type   Diverticulitis       Disposition:   DISCHARGE.  The patient was discharged with both verbal and written anticipatory guidance and strict return precautions. Discharge diagnosis, instructions and plan were discussed and understood. I emphasized the importance of following up with their primary care provider within 24-48 hours and with any referrals in the timeframe recommended. At the time of discharge the patient was comfortable and was in no apparent distress. Patient is aware of diagnostic uncertainty and was notified though testing is negative here, there is a very small chance that pathology may be missed.  The patient understands these risks and the patient/family understood to call 911 or return immediately to the emergency department if the symptoms worsen or if they have any additional concerns.      FOLLOW UP  Primary care provider in 1-2 days.      ED Prescriptions       Medication Sig Dispense Start Date End Date Auth. Provider    amoxicillin-pot clavulanate (Augmentin) 875-125 mg tablet Take 1  tablet by mouth every 12 hours for 7 days. 14 tablet 1/16/2025 1/23/2025 MD Roxann Arroyo MD  EM/IM/Peds    This note was dictated by speech recognition. Minor errors in transcription may be present.     Roxann Meza MD  01/16/25 9513

## 2025-01-17 ENCOUNTER — HOSPITAL ENCOUNTER (OUTPATIENT)
Dept: CARDIOLOGY | Facility: CLINIC | Age: 80
Discharge: HOME | End: 2025-01-17
Payer: MEDICARE

## 2025-01-17 DIAGNOSIS — I44.2 ATRIOVENTRICULAR BLOCK, COMPLETE (MULTI): ICD-10-CM

## 2025-01-22 ENCOUNTER — ANTICOAGULATION - WARFARIN VISIT (OUTPATIENT)
Dept: CARDIOLOGY | Facility: HOSPITAL | Age: 80
End: 2025-01-22
Payer: MEDICARE

## 2025-01-22 DIAGNOSIS — Z95.2 S/P MVR (MITRAL VALVE REPLACEMENT): ICD-10-CM

## 2025-01-22 LAB
POC INR: 3
POC PROTHROMBIN TIME: NORMAL

## 2025-01-22 PROCEDURE — 99211 OFF/OP EST MAY X REQ PHY/QHP: CPT

## 2025-01-22 PROCEDURE — 85610 PROTHROMBIN TIME: CPT | Mod: QW

## 2025-01-22 NOTE — PROGRESS NOTES
Patient identification verified with 2 identifiers.    Location: [facility] - [place] [place] [place] [address] [phone] option #2        Referring Physician: Dr. Carlos Valencia   Enrollment/ Re-enrollment date: 8/9/2025  INR Goal: 2.0-3.0  INR monitoring is per AMS protocol.  Anticoagulation Medication: warfarin 3 mg tablets   Indication: Mitral Valve Replacement    Subjective   Bleeding signs/symptoms: No    Bruising: No   Major bleeding event: No  Thrombosis signs/symptoms: No  Thromboembolic event: No  Missed doses: No  Extra doses: No  Medication changes: Yes  pt currently on antibiotics. should finish in the next few days  Dietary changes: No  Change in health: No  Change in activity: No  Alcohol: No  Other concerns: No    Upcoming Procedures:  Does the Patient Have any upcoming procedures that require interruption in anticoagulation therapy? no  Does the patient require bridging? no      Anticoagulation Summary  As of 1/22/2025      INR goal:  2.0-3.0   TTR:  64.6% (1.3 y)   INR used for dosing:  3.00 (1/22/2025)   Weekly warfarin total:  19.5 mg               Assessment/Plan   Therapeutic    1. New dose: no change  2. Next INR: 1 week      Education provided to patient during the visit:  Patient instructed to call in interim with questions, concerns and changes.   Patient educated on interactions between medications and warfarin.   Patient educated on dietary consistency in vitamin k consumption.   Patient educated on affects of alcohol consumption while taking warfarin.   Patient educated on signs of bleeding/clotting.   Patient educated on compliance with dosing, follow up appointments, and prescribed plan of care.

## 2025-01-29 ENCOUNTER — APPOINTMENT (OUTPATIENT)
Dept: CARDIOLOGY | Facility: HOSPITAL | Age: 80
End: 2025-01-29
Payer: MEDICARE

## 2025-01-29 ENCOUNTER — ANTICOAGULATION - WARFARIN VISIT (OUTPATIENT)
Dept: CARDIOLOGY | Facility: HOSPITAL | Age: 80
End: 2025-01-29
Payer: MEDICARE

## 2025-01-29 DIAGNOSIS — Z95.2 S/P MVR (MITRAL VALVE REPLACEMENT): Primary | ICD-10-CM

## 2025-02-03 ENCOUNTER — ANTICOAGULATION - WARFARIN VISIT (OUTPATIENT)
Dept: CARDIOLOGY | Facility: HOSPITAL | Age: 80
End: 2025-02-03
Payer: MEDICARE

## 2025-02-03 DIAGNOSIS — Z95.2 S/P MVR (MITRAL VALVE REPLACEMENT): Primary | ICD-10-CM

## 2025-02-03 LAB
POC INR: 1.8
POC PROTHROMBIN TIME: NORMAL

## 2025-02-03 PROCEDURE — 99211 OFF/OP EST MAY X REQ PHY/QHP: CPT

## 2025-02-03 PROCEDURE — 85610 PROTHROMBIN TIME: CPT | Mod: QW

## 2025-02-03 NOTE — PROGRESS NOTES
Patient identification verified with 2 identifiers.    Location: [facility] - [place] [address] [place] [address] [phone] option #2        Referring Physician: Dr. Carlos Valencia   Enrollment/ Re-enrollment date: 2025  INR Goal: 2.0-3.0  INR monitoring is per AMS protocol.  Anticoagulation Medication: warfarin 3 mg tablets   Indication: Mitral Valve Replacement    Subjective   Bleeding signs/symptoms: No  Bruising: No   Major bleeding event: No  Thrombosis signs/symptoms: No  Thromboembolic event: No  Missed doses: No  Extra doses: No  Medication changes: Yes  finished antibiotic  Dietary changes: No  Change in health: No  Change in activity: No  Alcohol: No  Other concerns: No    Upcoming Procedures:  Does the Patient Have any upcoming procedures that require interruption in anticoagulation therapy? no  Does the patient require bridging? no      Anticoagulation Summary  As of 2/3/2025      INR goal:  2.0-3.0   TTR:  65.1% (1.3 y)   INR used for dosin.80 (2/3/2025)   Weekly warfarin total:  21 mg               Assessment/Plan   Subtherapeutic     1. New dose:  weekly dose increase     2. Next INR: 1 week      Education provided to patient during the visit:  Patient instructed to call in interim with questions, concerns and changes.   Patient educated on dietary consistency in vitamin k consumption.   Patient educated on compliance with dosing, follow up appointments, and prescribed plan of care.

## 2025-02-09 LAB
ATRIAL RATE: 71 BPM
P AXIS: 88 DEGREES
P OFFSET: 203 MS
P ONSET: 159 MS
PR INTERVAL: 120 MS
Q ONSET: 219 MS
QRS COUNT: 12 BEATS
QRS DURATION: 90 MS
QT INTERVAL: 410 MS
QTC CALCULATION(BAZETT): 445 MS
QTC FREDERICIA: 433 MS
R AXIS: 57 DEGREES
T AXIS: 73 DEGREES
T OFFSET: 424 MS
VENTRICULAR RATE: 71 BPM

## 2025-02-10 ENCOUNTER — ANTICOAGULATION - WARFARIN VISIT (OUTPATIENT)
Dept: CARDIOLOGY | Facility: HOSPITAL | Age: 80
End: 2025-02-10
Payer: MEDICARE

## 2025-02-10 DIAGNOSIS — Z95.2 S/P MVR (MITRAL VALVE REPLACEMENT): Primary | ICD-10-CM

## 2025-02-10 LAB
POC INR: 2.4
POC PROTHROMBIN TIME: NORMAL

## 2025-02-10 PROCEDURE — 85610 PROTHROMBIN TIME: CPT | Mod: QW

## 2025-02-10 PROCEDURE — 99211 OFF/OP EST MAY X REQ PHY/QHP: CPT

## 2025-02-10 NOTE — PROGRESS NOTES
Patient identification verified with 2 identifiers.    Location: [facility] - [place] [address] [place] [address] [phone] option #2        Referring Physician: Dr. Carlos Valencia   Enrollment/ Re-enrollment date: 2025  INR Goal: 2.0-3.0  INR monitoring is per AMS protocol.  Anticoagulation Medication: warfarin 3 mg tablets   Indication: Mitral Valve Replacement    Subjective   Bleeding signs/symptoms: No    Bruising: No   Major bleeding event: No  Thrombosis signs/symptoms: No  Thromboembolic event: No  Missed doses: No  Extra doses: No  Medication changes: No  Dietary changes: No  Change in health: No  Change in activity: No  Alcohol: No  Other concerns: No    Upcoming Procedures:  Does the Patient Have any upcoming procedures that require interruption in anticoagulation therapy? no  Does the patient require bridging? no      Anticoagulation Summary  As of 2/10/2025      INR goal:  2.0-3.0   TTR:  65.1% (1.3 y)   INR used for dosin.40 (2/10/2025)   Weekly warfarin total:  21 mg               Assessment/Plan   Therapeutic     1. New dose: no change    2. Next INR: 1 week      Education provided to patient during the visit:  Patient instructed to call in interim with questions, concerns and changes.   Patient educated on compliance with dosing, follow up appointments, and prescribed plan of care.

## 2025-02-17 ENCOUNTER — ANTICOAGULATION - WARFARIN VISIT (OUTPATIENT)
Dept: CARDIOLOGY | Facility: HOSPITAL | Age: 80
End: 2025-02-17
Payer: MEDICARE

## 2025-02-17 ENCOUNTER — APPOINTMENT (OUTPATIENT)
Dept: CARDIOLOGY | Facility: HOSPITAL | Age: 80
End: 2025-02-17
Payer: MEDICARE

## 2025-02-17 DIAGNOSIS — Z95.2 S/P MVR (MITRAL VALVE REPLACEMENT): Primary | ICD-10-CM

## 2025-02-20 DIAGNOSIS — E78.2 MIXED HYPERLIPIDEMIA: ICD-10-CM

## 2025-02-20 DIAGNOSIS — I48.3 TYPICAL ATRIAL FLUTTER (MULTI): ICD-10-CM

## 2025-02-20 PROCEDURE — RXMED WILLOW AMBULATORY MEDICATION CHARGE

## 2025-02-20 RX ORDER — ATORVASTATIN CALCIUM 40 MG/1
40 TABLET, FILM COATED ORAL DAILY
Qty: 90 TABLET | Refills: 0 | Status: SHIPPED | OUTPATIENT
Start: 2025-02-20

## 2025-02-20 RX ORDER — WARFARIN 3 MG/1
3 TABLET ORAL EVERY EVENING
Qty: 90 TABLET | Refills: 1 | Status: SHIPPED | OUTPATIENT
Start: 2025-02-20

## 2025-02-21 ENCOUNTER — PHARMACY VISIT (OUTPATIENT)
Dept: PHARMACY | Facility: CLINIC | Age: 80
End: 2025-02-21
Payer: COMMERCIAL

## 2025-02-21 ENCOUNTER — ANTICOAGULATION - WARFARIN VISIT (OUTPATIENT)
Dept: CARDIOLOGY | Facility: HOSPITAL | Age: 80
End: 2025-02-21
Payer: MEDICARE

## 2025-02-21 DIAGNOSIS — Z95.2 S/P MVR (MITRAL VALVE REPLACEMENT): Primary | ICD-10-CM

## 2025-02-21 LAB
POC INR: 2.5
POC PROTHROMBIN TIME: NORMAL

## 2025-02-21 PROCEDURE — RXOTC WILLOW AMBULATORY OTC CHARGE

## 2025-02-21 PROCEDURE — 85610 PROTHROMBIN TIME: CPT | Mod: QW

## 2025-02-21 PROCEDURE — 99211 OFF/OP EST MAY X REQ PHY/QHP: CPT

## 2025-02-21 NOTE — PROGRESS NOTES
Patient identification verified with 2 identifiers.    Location: [facility] - [place] [address] [place] [address] [phone] option #2        Referring Physician: Dr. Carlos Valencia   Enrollment/ Re-enrollment date: 2025  INR Goal: 2.0-3.0  INR monitoring is per AMS protocol.  Anticoagulation Medication: warfarin 3 mg tablets   Indication: Mitral Valve Replacement    Subjective   Bleeding signs/symptoms: No    Bruising: No   Major bleeding event: No  Thrombosis signs/symptoms: No  Thromboembolic event: No  Missed doses: No  Extra doses: No  Medication changes: No  Dietary changes: No  Change in health: No  Change in activity: No  Alcohol: No  Other concerns: No    Upcoming Procedures:  Does the Patient Have any upcoming procedures that require interruption in anticoagulation therapy? no  Does the patient require bridging? no      Anticoagulation Summary  As of 2025      INR goal:  2.0-3.0   TTR:  65.9% (1.4 y)   INR used for dosin.50 (2025)   Weekly warfarin total:  21 mg               Assessment/Plan   Therapeutic     1. New dose: no change    2. Next INR: 2 weeks      Education provided to patient during the visit:  Patient instructed to call in interim with questions, concerns and changes.   Patient educated on compliance with dosing, follow up appointments, and prescribed plan of care.

## 2025-02-24 DIAGNOSIS — Z95.1 S/P CABG (CORONARY ARTERY BYPASS GRAFT): ICD-10-CM

## 2025-02-26 PROCEDURE — RXMED WILLOW AMBULATORY MEDICATION CHARGE

## 2025-02-26 RX ORDER — LISINOPRIL 30 MG/1
30 TABLET ORAL DAILY
Qty: 100 TABLET | Refills: 1 | Status: SHIPPED | OUTPATIENT
Start: 2025-02-26 | End: 2026-04-02

## 2025-03-07 ENCOUNTER — APPOINTMENT (OUTPATIENT)
Dept: CARDIOLOGY | Facility: HOSPITAL | Age: 80
End: 2025-03-07
Payer: MEDICARE

## 2025-03-07 ENCOUNTER — PHARMACY VISIT (OUTPATIENT)
Dept: PHARMACY | Facility: CLINIC | Age: 80
End: 2025-03-07
Payer: COMMERCIAL

## 2025-03-07 DIAGNOSIS — Z95.2 S/P MVR (MITRAL VALVE REPLACEMENT): Primary | ICD-10-CM

## 2025-03-07 LAB
POC INR: 1.7
POC PROTHROMBIN TIME: NORMAL

## 2025-03-07 PROCEDURE — RXOTC WILLOW AMBULATORY OTC CHARGE

## 2025-03-07 PROCEDURE — RXMED WILLOW AMBULATORY MEDICATION CHARGE

## 2025-03-07 PROCEDURE — 85610 PROTHROMBIN TIME: CPT | Mod: QW

## 2025-03-07 PROCEDURE — 99211 OFF/OP EST MAY X REQ PHY/QHP: CPT

## 2025-03-07 RX ORDER — METOPROLOL TARTRATE 50 MG/1
50 TABLET ORAL EVERY 12 HOURS
Qty: 180 TABLET | Refills: 3 | OUTPATIENT
Start: 2024-09-12

## 2025-03-07 NOTE — PROGRESS NOTES
Patient identification verified with 2 identifiers.    Location: [facility] - [place] [address] [place] [address] [phone] option #2        Referring Physician: Dr. Carlos Valencia   Enrollment/ Re-enrollment date: 2025  INR Goal: 2.0-3.0  INR monitoring is per AMS protocol.  Anticoagulation Medication: warfarin 3 mg tablets   Indication: Mitral Valve Replacement    Subjective   Bleeding signs/symptoms: No    Bruising: No   Major bleeding event: No  Thrombosis signs/symptoms: No  Thromboembolic event: No  Missed doses: Yes  Patient missed her tablet on 3/5  Extra doses: No  Medication changes: No  Dietary changes: No  Change in health: No  Change in activity: No  Alcohol: No  Other concerns: No    Upcoming Procedures:  Does the Patient Have any upcoming procedures that require interruption in anticoagulation therapy? no  Does the patient require bridging? no      Anticoagulation Summary  As of 3/7/2025      INR goal:  2.0-3.0   TTR:  65.8% (1.4 y)   INR used for dosin.70 (3/7/2025)   Weekly warfarin total:  21 mg               Assessment/Plan   Subtherapeutic     1. New dose: no change    2. Next INR: 1 week      Education provided to patient during the visit:  Patient instructed to call in interim with questions, concerns and changes.   Patient educated on compliance with dosing, follow up appointments, and prescribed plan of care.

## 2025-03-14 ENCOUNTER — PHARMACY VISIT (OUTPATIENT)
Dept: PHARMACY | Facility: CLINIC | Age: 80
End: 2025-03-14
Payer: COMMERCIAL

## 2025-03-14 ENCOUNTER — ANTICOAGULATION - WARFARIN VISIT (OUTPATIENT)
Dept: CARDIOLOGY | Facility: HOSPITAL | Age: 80
End: 2025-03-14
Payer: MEDICARE

## 2025-03-14 DIAGNOSIS — Z95.2 S/P MVR (MITRAL VALVE REPLACEMENT): Primary | ICD-10-CM

## 2025-03-14 LAB
POC INR: 1.9
POC PROTHROMBIN TIME: NORMAL

## 2025-03-14 PROCEDURE — 85610 PROTHROMBIN TIME: CPT | Mod: QW

## 2025-03-14 PROCEDURE — 99211 OFF/OP EST MAY X REQ PHY/QHP: CPT

## 2025-03-14 PROCEDURE — RXOTC WILLOW AMBULATORY OTC CHARGE

## 2025-03-14 NOTE — PROGRESS NOTES
Patient identification verified with 2 identifiers.    Location: [facility] - [place] [address] [place] [address] [phone] option #2        Referring Physician: Dr. Carlos Valencia   Enrollment/ Re-enrollment date: 2025  INR Goal: 2.0-3.0  INR monitoring is per AMS protocol.  Anticoagulation Medication: warfarin 3 mg tablets   Indication: Mitral Valve Replacement    Subjective   Bleeding signs/symptoms: No    Bruising: No   Major bleeding event: No  Thrombosis signs/symptoms: No  Thromboembolic event: No  Missed doses: No  Extra doses: No  Medication changes: No  Dietary changes: No  Change in health: No  Change in activity: No  Alcohol: No  Other concerns: No    Upcoming Procedures:  Does the Patient Have any upcoming procedures that require interruption in anticoagulation therapy? no  Does the patient require bridging? no      Anticoagulation Summary  As of 3/14/2025      INR goal:  2.0-3.0   TTR:  64.9% (1.4 y)   INR used for dosin.90 (3/14/2025)   Weekly warfarin total:  21 mg               Assessment/Plan   Subtherapeutic     1. New dose:  will give extra 1.5mg today, 3/14     2. Next INR: 1 week      Education provided to patient during the visit:  Patient instructed to call in interim with questions, concerns and changes.   Patient educated on compliance with dosing, follow up appointments, and prescribed plan of care.

## 2025-03-21 ENCOUNTER — ANTICOAGULATION - WARFARIN VISIT (OUTPATIENT)
Dept: CARDIOLOGY | Facility: HOSPITAL | Age: 80
End: 2025-03-21
Payer: MEDICARE

## 2025-03-21 DIAGNOSIS — Z95.2 S/P MVR (MITRAL VALVE REPLACEMENT): Primary | ICD-10-CM

## 2025-03-21 LAB
POC INR: 2.3
POC PROTHROMBIN TIME: NORMAL

## 2025-03-21 PROCEDURE — 99211 OFF/OP EST MAY X REQ PHY/QHP: CPT

## 2025-03-21 PROCEDURE — 85610 PROTHROMBIN TIME: CPT | Mod: QW

## 2025-03-21 NOTE — PROGRESS NOTES
Patient identification verified with 2 identifiers.    Location: [facility] - [facility] [facility] [id number] [place] [address] [phone] option #2        Referring Physician: Dr. Carlos Valencia   Enrollment/ Re-enrollment date: 2025  INR Goal: 2.0-3.0  INR monitoring is per [facility] protocol.  Anticoagulation Medication: warfarin 3 mg tablets   Indication: Mitral Valve Replacement    Subjective   Bleeding signs/symptoms: No  Bruising: No   Major bleeding event: No  Thrombosis signs/symptoms: No  Thromboembolic event: No  Missed doses: No  Extra doses: No  Medication changes: No  Dietary changes: No  Change in health: No  Change in activity: No  Alcohol: No  Other concerns: No    Upcoming Procedures:  Does the Patient Have any upcoming procedures that require interruption in anticoagulation therapy? no  Does the patient require bridging? no      Anticoagulation Summary  As of 3/21/2025      INR goal:  2.0-3.0   TTR:  65.0% (1.4 y)   INR used for dosin.30 (3/21/2025)   Weekly warfarin total:  21 mg               Assessment/Plan   Therapeutic     1. New dose: no change    2. Next INR: 1 week      Education provided to patient during the visit:  Patient instructed to call in interim with questions, concerns and changes.   Patient educated on compliance with dosing, follow up appointments, and prescribed plan of care.

## 2025-03-27 ENCOUNTER — ANTICOAGULATION - WARFARIN VISIT (OUTPATIENT)
Dept: CARDIOLOGY | Facility: HOSPITAL | Age: 80
End: 2025-03-27
Payer: MEDICARE

## 2025-03-27 ENCOUNTER — APPOINTMENT (OUTPATIENT)
Dept: RADIOLOGY | Facility: HOSPITAL | Age: 80
End: 2025-03-27
Payer: MEDICARE

## 2025-03-27 DIAGNOSIS — Z95.2 S/P MVR (MITRAL VALVE REPLACEMENT): Primary | ICD-10-CM

## 2025-03-27 LAB
POC INR: 2.4
POC PROTHROMBIN TIME: NORMAL

## 2025-03-27 PROCEDURE — 99211 OFF/OP EST MAY X REQ PHY/QHP: CPT

## 2025-03-27 PROCEDURE — 85610 PROTHROMBIN TIME: CPT | Mod: QW

## 2025-03-27 NOTE — PROGRESS NOTES
Patient identification verified with 2 identifiers.    Location: [facility] - [facility] [address] [place] [address] [phone] option #2        Referring Physician: Dr. Carlos Valencia   Enrollment/ Re-enrollment date: 2025  INR Goal: 2.0-3.0  INR monitoring is per [facility] protocol.  Anticoagulation Medication: warfarin 3 mg tablets   Indication: Mitral Valve Replacement    Subjective   Bleeding signs/symptoms: No    Bruising: No   Major bleeding event: No  Thrombosis signs/symptoms: No  Thromboembolic event: No  Missed doses: No  Extra doses: No  Medication changes: No  Dietary changes: No  Change in health: No  Change in activity: No  Alcohol: No  Other concerns: No    Upcoming Procedures:  Does the Patient Have any upcoming procedures that require interruption in anticoagulation therapy? no  Does the patient require bridging? no      Anticoagulation Summary  As of 3/27/2025      INR goal:  2.0-3.0   TTR:  65.4% (1.4 y)   INR used for dosin.40 (3/27/2025)   Weekly warfarin total:  21 mg               Assessment/Plan   Therapeutic     1. New dose: no change    2. Next INR: 2 weeks      Education provided to patient during the visit:  Patient instructed to call in interim with questions, concerns and changes.   Patient educated on compliance with dosing, follow up appointments, and prescribed plan of care.

## 2025-04-02 ENCOUNTER — APPOINTMENT (OUTPATIENT)
Dept: OPHTHALMOLOGY | Facility: CLINIC | Age: 80
End: 2025-04-02
Payer: MEDICARE

## 2025-04-15 ENCOUNTER — HOSPITAL ENCOUNTER (OUTPATIENT)
Dept: CARDIOLOGY | Facility: CLINIC | Age: 80
Discharge: HOME | End: 2025-04-15
Payer: MEDICARE

## 2025-04-15 ENCOUNTER — OFFICE VISIT (OUTPATIENT)
Dept: CARDIOLOGY | Facility: CLINIC | Age: 80
End: 2025-04-15
Payer: MEDICARE

## 2025-04-15 ENCOUNTER — ANTICOAGULATION - WARFARIN VISIT (OUTPATIENT)
Dept: CARDIOLOGY | Facility: CLINIC | Age: 80
End: 2025-04-15
Payer: MEDICARE

## 2025-04-15 VITALS
HEART RATE: 60 BPM | WEIGHT: 159 LBS | OXYGEN SATURATION: 100 % | DIASTOLIC BLOOD PRESSURE: 64 MMHG | HEIGHT: 65 IN | SYSTOLIC BLOOD PRESSURE: 113 MMHG | BODY MASS INDEX: 26.49 KG/M2

## 2025-04-15 DIAGNOSIS — Z95.2 S/P MVR (MITRAL VALVE REPLACEMENT): ICD-10-CM

## 2025-04-15 DIAGNOSIS — Z95.2 S/P MVR (MITRAL VALVE REPLACEMENT): Primary | ICD-10-CM

## 2025-04-15 DIAGNOSIS — I34.0 MITRAL VALVE INSUFFICIENCY, UNSPECIFIED ETIOLOGY: Primary | ICD-10-CM

## 2025-04-15 LAB
AORTIC VALVE PEAK VELOCITY: 1.28 M/S
AV PEAK GRADIENT: 7 MMHG
AVA (PEAK VEL): 2.42 CM2
EJECTION FRACTION APICAL 4 CHAMBER: 48
EJECTION FRACTION: 69 %
LEFT ATRIUM VOLUME AREA LENGTH INDEX BSA: 36 ML/M2
LEFT VENTRICLE INTERNAL DIMENSION DIASTOLE: 4.1 CM (ref 3.5–6)
LEFT VENTRICULAR OUTFLOW TRACT DIAMETER: 1.98 CM
MITRAL VALVE E/A RATIO: 2.17
POC INR: 2.1
POC PROTHROMBIN TIME: NORMAL
RIGHT VENTRICLE FREE WALL PEAK S': 7 CM/S
RIGHT VENTRICLE PEAK SYSTOLIC PRESSURE: 44.9 MMHG
TRICUSPID ANNULAR PLANE SYSTOLIC EXCURSION: 1.7 CM

## 2025-04-15 PROCEDURE — 1160F RVW MEDS BY RX/DR IN RCRD: CPT | Performed by: INTERNAL MEDICINE

## 2025-04-15 PROCEDURE — 1036F TOBACCO NON-USER: CPT | Performed by: INTERNAL MEDICINE

## 2025-04-15 PROCEDURE — 93306 TTE W/DOPPLER COMPLETE: CPT | Performed by: INTERNAL MEDICINE

## 2025-04-15 PROCEDURE — 1126F AMNT PAIN NOTED NONE PRSNT: CPT | Performed by: INTERNAL MEDICINE

## 2025-04-15 PROCEDURE — 99214 OFFICE O/P EST MOD 30 MIN: CPT | Performed by: INTERNAL MEDICINE

## 2025-04-15 PROCEDURE — 99214 OFFICE O/P EST MOD 30 MIN: CPT | Mod: 25 | Performed by: INTERNAL MEDICINE

## 2025-04-15 PROCEDURE — 1159F MED LIST DOCD IN RCRD: CPT | Performed by: INTERNAL MEDICINE

## 2025-04-15 PROCEDURE — 3078F DIAST BP <80 MM HG: CPT | Performed by: INTERNAL MEDICINE

## 2025-04-15 PROCEDURE — 1157F ADVNC CARE PLAN IN RCRD: CPT | Performed by: INTERNAL MEDICINE

## 2025-04-15 PROCEDURE — 3074F SYST BP LT 130 MM HG: CPT | Performed by: INTERNAL MEDICINE

## 2025-04-15 PROCEDURE — 85610 PROTHROMBIN TIME: CPT | Mod: QW

## 2025-04-15 PROCEDURE — 99211 OFF/OP EST MAY X REQ PHY/QHP: CPT | Mod: 25

## 2025-04-15 PROCEDURE — 93306 TTE W/DOPPLER COMPLETE: CPT

## 2025-04-15 ASSESSMENT — ENCOUNTER SYMPTOMS
DEPRESSION: 0
OCCASIONAL FEELINGS OF UNSTEADINESS: 0
LOSS OF SENSATION IN FEET: 0

## 2025-04-15 ASSESSMENT — PAIN SCALES - GENERAL: PAINLEVEL_OUTOF10: 0-NO PAIN

## 2025-04-15 NOTE — PROGRESS NOTES
Patient identification verified with 2 identifiers.    Location: [facility] - [address] [place] [address] [phone] option #1     Referring Physician: DR. FARAZ HERRING  Enrollment/ Re-enrollment date: 2025   INR Goal: 2.0-3.0  INR monitoring is per [facility] protocol.  Anticoagulation Medication: warfarin  Indication: Mitral Valve Replacement    Subjective   Bleeding signs/symptoms: No    Bruising: No   Major bleeding event: No  Thrombosis signs/symptoms: No  Thromboembolic event: No  Missed doses: No  PT MISSED LAST NIGHTS DOSE  Extra doses: No  Medication changes: No  Dietary changes: No  Change in health: No  Change in activity: No  Alcohol: No  Other concerns: No    Upcoming Procedures:  Does the Patient Have any upcoming procedures that require interruption in anticoagulation therapy? no  Does the patient require bridging? no      Anticoagulation Summary  As of 4/15/2025      INR goal:  2.0-3.0   TTR:  66.6% (1.5 y)   INR used for dosin.10 (4/15/2025)   Weekly warfarin total:  21 mg               Assessment/Plan   Therapeutic     1. New dose: no change    2. Next INR: 2 weeks      Education provided to patient during the visit:  Patient instructed to call in interim with questions, concerns and changes.   Patient educated on compliance with dosing, follow up appointments, and prescribed plan of care.

## 2025-04-15 NOTE — PROGRESS NOTES
Primary Care Physician: Octavia Marques MD  Date of Visit: 04/15/2025 10:00 AM EDT  [place] [facility] [place] [address]     Chief Complaint:   Chief Complaint   Patient presents with    Follow-up     Echo results    Hyperlipidemia    Hypertension     Atrial Flutter     Chief complaint evaluation of valvular heart disease.  Recent labs were reviewed  HPI / Summary:   Neha Tsang is a 79 y.o. female presents for new cardiovascular evaluation. No ref. 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  Echo today showed stable aortic mitral and tricuspid valve function with expansion ring repair of both the mitral and tricuspid valve with mild pulmonary hypertension normal RV size with reduced RV function no appreciable change in comparison  Echo in 2021  She is on warfarin for an atrial arrhythmia most recent labs in January reviewed nothing concern  January 16 ER evaluation for some tenderness of her pacemaker pocket she was discharged without any cardiac diagnosis that I can see  Currently feels well mostly experiencing pain in her back and knee but no clear activity limitations no symptoms of volume overload no anginal type complaints and no symptoms of palpitations.    Specialty Problems          Cardiology Problems    Complete heart block    Hyperlipidemia    Hypertension    S/P CABG (coronary artery bypass graft)    S/P MVR (mitral valve replacement)    Typical atrial flutter (Multi)    CAD (coronary artery disease)    Mitral regurgitation    Pacemaker    Tricuspid regurgitation    Chronic diastolic (congestive) heart failure    Coagulation defect, unspecified        Past Medical History:   Diagnosis Date    Anticoagulated     Atrial flutter (Multi)     CAD (coronary artery disease)      s/p CABG 2019    Cardiac pacemaker in situ 2019    [facility]    Cardiomyopathy     Difficult intubation 2019    Easy mask, mac 3 x2 unsuccessful attempts.  Glidescope #3, grade 1 view, 1 attempt.    Diverticulitis     GERD (gastroesophageal reflux disease)     HTN (hypertension)     Mitral valve disorder     s/p MV and TV repair 2019          Past Surgical History:   Procedure Laterality Date    CARDIAC ELECTROPHYSIOLOGY MAPPING AND ABLATION  10/2019    CARDIAC PACEMAKER PLACEMENT  09/19/2019    [facility]    COLONOSCOPY      CORONARY ARTERY BYPASS GRAFT  09/11/2019    Left internal mammary to left     anterior descending; sequential saphenous vein to marginal 1     and diagonal 1.    KNEE ARTHROSCOPY W/ DEBRIDEMENT      MITRAL VALVE REPAIR  09/11/2019    Radical repair of mitral valve: Posterior leaflet chordae,     #29 ATS ring.    TRICUSPID VALVE SURGERY  09/11/2019    Tricuspid valve repair: #29 ATS ring.    TUBAL LIGATION            Social History:   reports that she quit smoking about 42 years ago. Her smoking use included cigarettes. She has never used smokeless tobacco. She reports that she does not currently use alcohol. She reports current drug use. Drug: Marijuana.      Allergies:  Allergies   Allergen Reactions    Diethyltoluamide Swelling    Pneumococcal 13-Valent Conjugate To Diphtheria Crm Hives and Other     Fever    Shellfish Containing Products Swelling     Facial swelling, throat walling    Shellfish Derived Unknown    Sulfa (Sulfonamide Antibiotics) Hives       Outpatient Medications:  Current Outpatient Medications   Medication Instructions    acetaminophen (Tylenol) 325 mg tablet Every 4 hours PRN    amLODIPine (NORVASC) 10 mg, oral, Daily    aspirin 81 mg EC tablet Take by mouth.    atorvastatin (LIPITOR) 40 mg, oral, Daily    cholecalciferol (Vitamin D-3) 125 MCG (5000 UT) capsule Take by mouth.    ferrous sulfate 325 (65 Fe) MG tablet 65 mg of iron    gabapentin (NEURONTIN) 300 mg, Nightly     "lisinopril 30 mg, oral, Daily    magnesium oxide (Mag-Ox) 400 mg (241.3 mg magnesium) tablet 1 tablet, Daily    metoprolol tartrate (LOPRESSOR) 50 mg, oral, Every 12 hours    metoprolol tartrate (LOPRESSOR) 50 mg, oral, Every 12 hours    spironolactone (ALDACTONE) 25 mg, oral, Daily    warfarin (COUMADIN) 3 mg, oral, Every evening, Take as directed       ROS     Physical Exam:  Vitals:    04/15/25 1003   Weight: 72.1 kg (159 lb)   Height: 1.651 m (5' 5\")     Wt Readings from Last 5 Encounters:   04/15/25 72.1 kg (159 lb)   01/08/25 66.2 kg (146 lb)   10/11/24 66.3 kg (146 lb 1 oz)   09/12/24 66.7 kg (147 lb)   08/22/24 65.8 kg (145 lb)     Body mass index is 26.46 kg/m².   Physical Exam   Well-appearing female in no acute distress.  Flat JVP.  Normal carotid upstrokes.  Regular rhythm with a systolic ejection murmur.  Clear lungs.  Soft abdomen.  No dependent edema with intact pedal pulses  Last Labs:  CMP:  Recent Labs     01/16/25  1204 04/19/24  0613 04/18/24  0728 04/17/24  1301 08/28/23  1804    137 137 137 139   K 3.6 3.9 3.9 5.5* 3.7    105 107 106 101   CO2 25 22 19* 20* 27   ANIONGAP 13 14 15 17 15   BUN 15 9 14 20 7   CREATININE 0.76 1.03 0.90 0.97 0.88   EGFR 81 56* 66 61  --    GLUCOSE 113* 86 82 108* 107*     Recent Labs     01/16/25  1204 04/17/24  1301 08/22/23  0522 08/21/23  1123 08/17/23  1236 01/03/23  1044 10/05/22  1102   ALBUMIN 4.3 4.6 3.6 4.2 4.3 4.2 4.2   ALKPHOS 91 75 51 62 74 89 77   ALT 8 9 6* 8 7 21 18   AST 16 26 13 15 14 27 24   BILITOT 0.6 0.5 0.5 0.6 0.6 0.5 0.7   LIPASE  --  65  --  48  --  56 58     CBC:  Recent Labs     01/16/25  1204 04/19/24  0614 04/18/24  0728 04/17/24  1301 08/25/23  0448   WBC 16.4* 9.8 13.3* 17.9* CANCELED   HGB 13.9 12.7 12.4 14.1 CANCELED   HCT 42.6 38.6 38.1 44.3 CANCELED    213 216 227 CANCELED   MCV 80 81 81 84 CANCELED     COAG:   Recent Labs     04/15/25  0000 03/27/25  0000 03/21/25  1002 03/14/25  0000 03/07/25  0000   INR 2.10 " 2.40 2.30 1.90 1.70     HEME/ENDO:  Recent Labs     12/18/23  1103 03/13/22  1036 09/23/21  1035 06/11/21  0909 10/23/19  0144 09/08/19  0502 08/22/19  0844   FERRITIN  --   --   --   --  38  --   --    IRONSAT  --   --  16*  --  6*  --   --    TSH 1.27 1.25  --  0.95  --  1.25  --    HGBA1C 5.7*  --   --   --   --   --  5.4      CARDIAC:   Recent Labs     01/16/25  1539 01/16/25  1204 04/17/24  1301 01/03/23  1044 10/22/22  1758 10/22/22  1627 10/05/22  1102 10/05/22  1102 03/13/22  1036 09/07/19  1056   TROPHS 12 10 6 6 13 9   < > 11  --   --    BNP  --  112*  --   --   --  57  --  142* 74 172*    < > = values in this interval not displayed.     Recent Labs     12/18/23  1103 09/08/19  0502   CHOL 124 182   LDLF  --  118*   HDL 42.9 34.2*   TRIG 111 151*       Last Cardiology Tests:  ECG:      Echo:  Echo Results:  Transthoracic Echo (TTE) Complete 04/15/2025    [facility], [address] [place] [address]  Tel [phone] and Fax [phone]    TRANSTHORACIC ECHOCARDIOGRAM REPORT      Patient Name:       VIOLETA Martinez Physician:    Ethel Herring MD  Study Date:         4/15/2025           Ordering Provider:    Ethel HERRING  MRN/PID:            [id number]            Fellow:  Accession#:         [id number]        Nurse:  Date of Birth/Age:  1945 / 79     Sonographer:          Latoya Rust RDCS  years  Gender assigned at  F                   Additional Staff:  Birth:  Height:             165.10 cm           Admit Date:  Weight:             66.22 kg            Admission Status:     Outpatient  BSA / BMI:          1.73 m2 / 24.30     Encounter#:           [id number]  kg/m2  Blood Pressure:     129/58 mmHg         Department Location:  [facility]  Echo Lab    Study Type:    TRANSTHORACIC ECHO (TTE) COMPLETE  Diagnosis/ICD: Presence of prosthetic heart valve-Z95.2  Indication:    s/p  MVR  CPT Code:      Echo Complete w Full Doppler-[id number]    Patient History:  Pacer/Defib:       Permanent pacemaker  Pertinent History: A-Fib, HTN and Hyperlipidemia. CAD s/p CABG x3 & s/p #29  Simulus Ring MV repair & s/p #29 Simulus Ring TV repair (all  completed on 09/11/2019).    Study Detail: The following Echo studies were performed: 2D, M-Mode, Doppler and  color flow. Technically challenging study due to prominent lung  artifact.      PHYSICIAN INTERPRETATION:  Left Ventricle: Left ventricular ejection fraction is normal, calculated by Cuevas's biplane at 69%. There are no regional left ventricular wall motion abnormalities. The left ventricular cavity size is normal. There is normal septal and normal posterior left ventricular wall thickness. Abnormal (paradoxical) septal motion consistent with post-operative status. Left ventricular diastolic filling cannot be determined due to mitral valve repair/replacement.  Left Atrium: The left atrium is mildly dilated.  Right Ventricle: The right ventricle is normal in size. There is mildly reduced right ventricular systolic function. A device is visualized in the right ventricle.  Right Atrium: The right atrium is normal in size. There is a device visualized in the right atrium.  Aortic Valve: The aortic valve is trileaflet. There is mild aortic valve cusp calcification. There is trace aortic valve regurgitation. The peak instantaneous gradient of the aortic valve is 7 mmHg.  Mitral Valve: Status post mitral valve repair. The peak instantaneous gradient of the mitral valve is 12 mmHg. There is trace to mild mitral valve regurgitation. 9/11/2019 S/P annular repair #29 mm Simulus ring.  Tricuspid Valve: Status post tricuspid valve repair. There is mild tricuspid regurgitation. The Doppler estimated RVSP is mildly elevated at 44.9 mmHg. 9/11/2019 S/P Ring repair @9 mm Simulus ring.  Pulmonic Valve: The pulmonic valve is not well visualized. There is physiologic  pulmonic valve regurgitation.  Pericardium: There is no pericardial effusion noted.  Aorta: The aortic root is normal. There is no dilatation of the ascending aorta.  Systemic Veins: The inferior vena cava appears normal in size.  In comparison to the previous echocardiogram(s): Compared with study dated 4/8/2021, no significant change.      CONCLUSIONS:  1. Left ventricular ejection fraction is normal, calculated by Cuevas's biplane at 69%.  2. Abnormal septal motion consistent with post-operative status.  3. There is mildly reduced right ventricular systolic function.  4. The left atrium is mildly dilated.  5. Status post mitral valve repair.  6. Status post tricuspid valve repair.  7. Mildly elevated right ventricular systolic pressure.    QUANTITATIVE DATA SUMMARY:    2D MEASUREMENTS:          Normal Ranges:  Ao Root d:       3.20 cm  (2.0-3.7cm)  LAs:             3.67 cm  (2.7-4.0cm)  RVIDd:           2.55 cm  (0.9-3.6cm)  IVSd:            0.80 cm  (0.6-1.1cm)  LVPWd:           0.67 cm  (0.6-1.1cm)  LVIDd:           4.10 cm  (3.9-5.9cm)  LVIDs:           2.82 cm  LV Mass Index:   50 g/m2  LVEDV Index:     46 ml/m2  LV % FS          31.3 %      LEFT ATRIUM:                  Normal Ranges:  LA Vol A4C:        52.3 ml    (22+/-6mL/m2)  LA Vol A2C:        74.1 ml  LA Vol BP:         62.3 ml  LA Vol Index A4C:  30.2ml/m2  LA Vol Index A2C:  42.8 ml/m2  LA Vol Index BP:   36.0 ml/m2  LA Area A4C:       18.4 cm2  LA Area A2C:       21.9 cm2  LA Major Axis A4C: 5.5 cm  LA Major Axis A2C: 5.5 cm  LA Vol A4C:        48.6 ml  LA Vol A2C:        67.8 ml  LA Vol Index BSA:  33.6 ml/m2      RIGHT ATRIUM:          Normal Ranges:  RA Area A4C:  13.7 cm2      M-MODE MEASUREMENTS:         Normal Ranges:  AoV Exc:             1.70 cm (1.5-2.5cm)      AORTA MEASUREMENTS:         Normal Ranges:  AoV Exc:            1.70 cm (1.5-2.5cm)  Asc Ao, d:          3.60 cm (2.1-3.4cm)      LV SYSTOLIC FUNCTION:  Normal Ranges:  EF-A4C View:     48 % (>=55%)  EF-A2C View:    82 %  EF-Biplane:     69 %  LV EF Reported: 69 %      LV DIASTOLIC FUNCTION:            Normal Ranges:  MV Peak E:             1.53 m/s   (0.7-1.2 m/s)  MV Peak A:             0.71 m/s   (0.42-0.7 m/s)  E/A Ratio:             2.17       (1.0-2.2)  MV e'                  0.060 m/s  (>8.0)  MV lateral e'          0.07 m/s  MV medial e'           0.05 m/s  MV A Dur:              83.04 msec  E/e' Ratio:            25.58      (<8.0)  MV DT:                 302 msec   (150-240 msec)      MITRAL VALVE:           Normal Ranges:  MV Vmax:      1.77 m/s  (<=1.3m/s)  MV peak P.5 mmHg (<5mmHg)  MV mean PG:   3.1 mmHg  (<2mmHg)  MV VTI:       43.71 cm  (10-13cm)  MV DT:        302 msec  (150-240msec)      AORTIC VALVE:           Normal Ranges:  AoV Vmax:      1.28 m/s (<=1.7m/s)  AoV Peak P.5 mmHg (<20mmHg)  LVOT Max Darrell:  1.00 m/s (<=1.1m/s)  LVOT VTI:      22.64 cm  LVOT Diameter: 1.98 cm  (1.8-2.4cm)  AoV Area,Vmax: 2.42 cm2 (2.5-4.5cm2)      RIGHT VENTRICLE:  RV Basal 3.10 cm  RV Mid   2.50 cm  RV Major 5.9 cm  TAPSE:   17.0 mm  RV s'    0.07 m/s      TRICUSPID VALVE/RVSP:          Normal Ranges:  Peak TR Velocity:     3.24 m/s  TV Vmax               0.95 m/s  RV Syst Pressure:     45 mmHg  (< 30mmHg)  TV mean P.2 mmHg  TV Peak PG:           3.6 mmHg  TV P1/2 time:         108 msec  IVC Diam:             1.80 cm      PULMONIC VALVE:          Normal Ranges:  PV Accel Time:  138 msec (>120ms)  PV Max Darrell:     0.9 m/s  (0.6-0.9m/s)  PV Max PG:      3.2 mmHg      AORTA:  Asc Ao Diam 3.60 cm      [id number] Carlos Valencia MD  Electronically signed on 4/15/2025 at 9:31:31 AM        ** Final **       Cath:      Stress Test:  Stress Results:  No results found for this or any previous visit from the past 365 days.         Cardiac Imaging:  Transthoracic Echo (TTE) [facility], [address] [place] [address]                                        [id number]                       Tel [phone] and Fax [phone]    TRANSTHORACIC ECHOCARDIOGRAM REPORT       Patient Name:       VIOLETA WANG   Juan Physician:    Ethel Valencia MD  Study Date:         4/15/2025           Ordering Provider:    Ethel VALENCIA  MRN/PID:            [id number]            Fellow:  Accession#:         [id number]        Nurse:  Date of Birth/Age:  1945 / 79     Sonographer:          Latoya Rust RDCS                      years  Gender assigned at  F                   Additional Staff:  Birth:  Height:             165.10 cm           Admit Date:  Weight:             66.22 kg            Admission Status:     Outpatient  BSA / BMI:          1.73 m2 / 24.30     Encounter#:           [id number]                      kg/m2  Blood Pressure:     129/58 mmHg         Department Location:  [facility]                                                                Echo Lab    Study Type:    TRANSTHORACIC ECHO (TTE) COMPLETE  Diagnosis/ICD: Presence of prosthetic heart valve-Z95.2  Indication:    s/p MVR  CPT Code:      Echo Complete w Full Doppler-[id number]    Patient History:  Pacer/Defib:       Permanent pacemaker  Pertinent History: A-Fib, HTN and Hyperlipidemia. CAD s/p CABG x3 & s/p #29                     Simulus Ring MV repair & s/p #29 Simulus Ring TV repair (all                     completed on 09/11/2019).    Study Detail: The following Echo studies were performed: 2D, M-Mode, Doppler and                color flow. Technically challenging study due to prominent lung                artifact.       PHYSICIAN INTERPRETATION:  Left Ventricle: Left ventricular ejection fraction is normal, calculated by Cuevas's biplane at 69%. There are no regional left ventricular wall motion abnormalities. The left ventricular cavity size is normal.  There is normal septal and normal posterior left ventricular wall thickness. Abnormal (paradoxical) septal motion consistent with post-operative status. Left ventricular diastolic filling cannot be determined due to mitral valve repair/replacement.  Left Atrium: The left atrium is mildly dilated.  Right Ventricle: The right ventricle is normal in size. There is mildly reduced right ventricular systolic function. A device is visualized in the right ventricle.  Right Atrium: The right atrium is normal in size. There is a device visualized in the right atrium.  Aortic Valve: The aortic valve is trileaflet. There is mild aortic valve cusp calcification. There is trace aortic valve regurgitation. The peak instantaneous gradient of the aortic valve is 7 mmHg.  Mitral Valve: Status post mitral valve repair. The peak instantaneous gradient of the mitral valve is 12 mmHg. There is trace to mild mitral valve regurgitation. 9/11/2019 S/P annular repair #29 mm Simulus ring.  Tricuspid Valve: Status post tricuspid valve repair. There is mild tricuspid regurgitation. The Doppler estimated RVSP is mildly elevated at 44.9 mmHg. 9/11/2019 S/P Ring repair @9 mm Simulus ring.  Pulmonic Valve: The pulmonic valve is not well visualized. There is physiologic pulmonic valve regurgitation.  Pericardium: There is no pericardial effusion noted.  Aorta: The aortic root is normal. There is no dilatation of the ascending aorta.  Systemic Veins: The inferior vena cava appears normal in size.  In comparison to the previous echocardiogram(s): Compared with study dated 4/8/2021, no significant change.       CONCLUSIONS:   1. Left ventricular ejection fraction is normal, calculated by Cuevas's biplane at 69%.   2. Abnormal septal motion consistent with post-operative status.   3. There is mildly reduced right ventricular systolic function.   4. The left atrium is mildly dilated.   5. Status post mitral valve repair.   6. Status post tricuspid valve  repair.   7. Mildly elevated right ventricular systolic pressure.    QUANTITATIVE DATA SUMMARY:     2D MEASUREMENTS:          Normal Ranges:  Ao Root d:       3.20 cm  (2.0-3.7cm)  LAs:             3.67 cm  (2.7-4.0cm)  RVIDd:           2.55 cm  (0.9-3.6cm)  IVSd:            0.80 cm  (0.6-1.1cm)  LVPWd:           0.67 cm  (0.6-1.1cm)  LVIDd:           4.10 cm  (3.9-5.9cm)  LVIDs:           2.82 cm  LV Mass Index:   50 g/m2  LVEDV Index:     46 ml/m2  LV % FS          31.3 %       LEFT ATRIUM:                  Normal Ranges:  LA Vol A4C:        52.3 ml    (22+/-6mL/m2)  LA Vol A2C:        74.1 ml  LA Vol BP:         62.3 ml  LA Vol Index A4C:  30.2ml/m2  LA Vol Index A2C:  42.8 ml/m2  LA Vol Index BP:   36.0 ml/m2  LA Area A4C:       18.4 cm2  LA Area A2C:       21.9 cm2  LA Major Axis A4C: 5.5 cm  LA Major Axis A2C: 5.5 cm  LA Vol A4C:        48.6 ml  LA Vol A2C:        67.8 ml  LA Vol Index BSA:  33.6 ml/m2       RIGHT ATRIUM:          Normal Ranges:  RA Area A4C:  13.7 cm2       M-MODE MEASUREMENTS:         Normal Ranges:  AoV Exc:             1.70 cm (1.5-2.5cm)       AORTA MEASUREMENTS:         Normal Ranges:  AoV Exc:            1.70 cm (1.5-2.5cm)  Asc Ao, d:          3.60 cm (2.1-3.4cm)       LV SYSTOLIC FUNCTION:                       Normal Ranges:  EF-A4C View:    48 % (>=55%)  EF-A2C View:    82 %  EF-Biplane:     69 %  LV EF Reported: 69 %       LV DIASTOLIC FUNCTION:            Normal Ranges:  MV Peak E:             1.53 m/s   (0.7-1.2 m/s)  MV Peak A:             0.71 m/s   (0.42-0.7 m/s)  E/A Ratio:             2.17       (1.0-2.2)  MV e'                  0.060 m/s  (>8.0)  MV lateral e'          0.07 m/s  MV medial e'           0.05 m/s  MV A Dur:              83.04 msec  E/e' Ratio:            25.58      (<8.0)  MV DT:                 302 msec   (150-240 msec)       MITRAL VALVE:           Normal Ranges:  MV Vmax:      1.77 m/s  (<=1.3m/s)  MV peak P.5 mmHg (<5mmHg)  MV mean PG:   3.1 mmHg   (<2mmHg)  MV VTI:       43.71 cm  (10-13cm)  MV DT:        302 msec  (150-240msec)       AORTIC VALVE:           Normal Ranges:  AoV Vmax:      1.28 m/s (<=1.7m/s)  AoV Peak P.5 mmHg (<20mmHg)  LVOT Max Darrell:  1.00 m/s (<=1.1m/s)  LVOT VTI:      22.64 cm  LVOT Diameter: 1.98 cm  (1.8-2.4cm)  AoV Area,Vmax: 2.42 cm2 (2.5-4.5cm2)       RIGHT VENTRICLE:  RV Basal 3.10 cm  RV Mid   2.50 cm  RV Major 5.9 cm  TAPSE:   17.0 mm  RV s'    0.07 m/s       TRICUSPID VALVE/RVSP:          Normal Ranges:  Peak TR Velocity:     3.24 m/s  TV Vmax               0.95 m/s  RV Syst Pressure:     45 mmHg  (< 30mmHg)  TV mean P.2 mmHg  TV Peak PG:           3.6 mmHg  TV P1/2 time:         108 msec  IVC Diam:             1.80 cm       PULMONIC VALVE:          Normal Ranges:  PV Accel Time:  138 msec (>120ms)  PV Max Darrell:     0.9 m/s  (0.6-0.9m/s)  PV Max PG:      3.2 mmHg       AORTA:  Asc Ao Diam 3.60 cm       [id number] Carlos Valencia MD  Electronically signed on 4/15/2025 at 9:31:31 AM       ** Final **        Assessment/Plan     She is 6 years out from her tricuspid and mitral valve repair with no change in her valvular function compared to an echo in  with preserved EF.  No change in drug treatment advised.  I will see her back in 6 months thank you for allow me to participate in your patient's care      Orders:  No orders of the defined types were placed in this encounter.     Followup Appts:  Future Appointments   Date Time Provider Department Center   2025  9:15 AM [facility] [id number] COAG CLINIC [id number] Academic   2025 10:30 AM [facility] MAMMO 3 CMCMAM [facility]   2025 11:00 AM [facility] BOL5F DXA 1 CMCDX [facility]   2025  8:30 AM Jluis Lee MD [id number]           ____________________________________________________________  Carlos Valencia MD    Senior Attending Physician  [facility] [place]  [facility]

## 2025-04-21 ENCOUNTER — HOSPITAL ENCOUNTER (OUTPATIENT)
Dept: CARDIOLOGY | Facility: CLINIC | Age: 80
Discharge: HOME | End: 2025-04-21
Payer: MEDICARE

## 2025-04-21 DIAGNOSIS — Z95.0 PRESENCE OF CARDIAC PACEMAKER: ICD-10-CM

## 2025-04-21 DIAGNOSIS — I44.2 ATRIOVENTRICULAR BLOCK, COMPLETE (MULTI): ICD-10-CM

## 2025-04-21 PROCEDURE — 93296 REM INTERROG EVL PM/IDS: CPT

## 2025-04-21 PROCEDURE — 93294 REM INTERROG EVL PM/LDLS PM: CPT | Performed by: INTERNAL MEDICINE

## 2025-04-29 ENCOUNTER — APPOINTMENT (OUTPATIENT)
Dept: CARDIOLOGY | Facility: HOSPITAL | Age: 80
End: 2025-04-29
Payer: MEDICARE

## 2025-04-29 ENCOUNTER — ANTICOAGULATION - WARFARIN VISIT (OUTPATIENT)
Dept: CARDIOLOGY | Facility: HOSPITAL | Age: 80
End: 2025-04-29
Payer: MEDICARE

## 2025-04-29 DIAGNOSIS — Z95.2 S/P MVR (MITRAL VALVE REPLACEMENT): Primary | ICD-10-CM

## 2025-05-02 ENCOUNTER — ANTICOAGULATION - WARFARIN VISIT (OUTPATIENT)
Dept: CARDIOLOGY | Facility: HOSPITAL | Age: 80
End: 2025-05-02
Payer: MEDICARE

## 2025-05-02 DIAGNOSIS — Z95.2 S/P MVR (MITRAL VALVE REPLACEMENT): ICD-10-CM

## 2025-05-02 LAB
POC INR: 1.5 (ref 0.9–1.1)
POC PROTHROMBIN TIME: ABNORMAL (ref 9.3–12.5)

## 2025-05-02 PROCEDURE — 85610 PROTHROMBIN TIME: CPT | Mod: QW

## 2025-05-02 PROCEDURE — 99211 OFF/OP EST MAY X REQ PHY/QHP: CPT

## 2025-05-02 NOTE — PROGRESS NOTES
Patient identification verified with 2 identifiers.    Location: [facility] [address] [address] [address] [place] [address] [phone] option #2     Referring Physician: DR. FARAZ HERRING  Enrollment/ Re-enrollment date: 2025   INR Goal: 2.0-3.0  INR monitoring is per AMS protocol.  Anticoagulation Medication: warfarin  Indication: Mitral Valve Replacement    Subjective   Bleeding signs/symptoms: No    Bruising: No   Major bleeding event: No  Thrombosis signs/symptoms: No  Thromboembolic event: No  Missed doses: Yes  Extra doses: No  Medication changes: No  Dietary changes: No  Change in health: No  Change in activity: No  Alcohol: No  Other concerns: No    Upcoming Procedures:  Does the Patient Have any upcoming procedures that require interruption in anticoagulation therapy? no  Does the patient require bridging? no      Anticoagulation Summary  As of 2025      INR goal:  2.0-3.0   TTR:  65.1% (1.5 y)   INR used for dosin.50 (2025)   Weekly warfarin total:  21 mg               Assessment/Plan   Subtherapeutic     1. New dose: will give 1.5mg extra today, , only and maintain TWD at this time    2. Next INR: 1 week at [place] per patient preference      Education provided to patient during the visit:  Patient instructed to call in interim with questions, concerns and changes.   Patient educated on compliance with dosing, follow up appointments, and prescribed plan of care.

## 2025-05-09 ENCOUNTER — ANTICOAGULATION - WARFARIN VISIT (OUTPATIENT)
Dept: CARDIOLOGY | Facility: CLINIC | Age: 80
End: 2025-05-09
Payer: MEDICARE

## 2025-05-09 DIAGNOSIS — Z95.2 S/P MVR (MITRAL VALVE REPLACEMENT): Primary | ICD-10-CM

## 2025-05-09 LAB
POC INR: 2.1 (ref 0.9–1.1)
POC PROTHROMBIN TIME: NORMAL (ref 9.3–12.5)

## 2025-05-09 PROCEDURE — 99211 OFF/OP EST MAY X REQ PHY/QHP: CPT

## 2025-05-09 PROCEDURE — 85610 PROTHROMBIN TIME: CPT | Mod: QW

## 2025-05-09 NOTE — PROGRESS NOTES
Patient identification verified with 2 identifiers.    Location: [facility] at [facility] - [address] [address] [phone] option #1     Referring Physician: Dr Carlos Valencia  Enrollment/ Re-enrollment date: 2025   INR Goal: 2.0-3.0  INR monitoring is per [facility] protocol.  Anticoagulation Medication: warfarin  Indication: Mitral Valve Replacement    Subjective   Bleeding signs/symptoms: No    Bruising: No   Major bleeding event: No  Thrombosis signs/symptoms: No  Thromboembolic event: No  Missed doses: No  Extra doses: No  Medication changes: No  Dietary changes: No  Change in health: No  Change in activity: No  Alcohol: No  Other concerns: No    Upcoming Procedures:  Does the Patient Have any upcoming procedures that require interruption in anticoagulation therapy? no  Does the patient require bridging? no      Anticoagulation Summary  As of 2025      INR goal:  2.0-3.0   TTR:  64.5% (1.6 y)   INR used for dosin.10 (2025)   Weekly warfarin total:  21 mg               Assessment/Plan   Therapeutic     1. New dose: no change    2. Next INR: 2 weeks      Education provided to patient during the visit:  Patient instructed to call in interim with questions, concerns and changes.   Patient educated on interactions between medications and warfarin.   Patient educated on dietary consistency in vitamin k consumption.   Patient educated on affects of alcohol consumption while taking warfarin.   Patient educated on signs of bleeding/clotting.   Patient educated on compliance with dosing, follow up appointments, and prescribed plan of care.

## 2025-05-22 ENCOUNTER — APPOINTMENT (OUTPATIENT)
Dept: PRIMARY CARE | Facility: CLINIC | Age: 80
End: 2025-05-22
Payer: MEDICARE

## 2025-05-22 ENCOUNTER — ANTICOAGULATION - WARFARIN VISIT (OUTPATIENT)
Dept: CARDIOLOGY | Facility: CLINIC | Age: 80
End: 2025-05-22
Payer: MEDICARE

## 2025-05-22 VITALS
WEIGHT: 163 LBS | DIASTOLIC BLOOD PRESSURE: 70 MMHG | SYSTOLIC BLOOD PRESSURE: 180 MMHG | HEART RATE: 73 BPM | HEIGHT: 65 IN | OXYGEN SATURATION: 98 % | BODY MASS INDEX: 27.16 KG/M2

## 2025-05-22 DIAGNOSIS — M25.571 ACUTE RIGHT ANKLE PAIN: ICD-10-CM

## 2025-05-22 DIAGNOSIS — M47.816 LUMBAR SPONDYLOSIS: ICD-10-CM

## 2025-05-22 DIAGNOSIS — F33.1 MAJOR DEPRESSIVE DISORDER, RECURRENT, MODERATE: ICD-10-CM

## 2025-05-22 DIAGNOSIS — K57.92 ACUTE DIVERTICULITIS: Primary | ICD-10-CM

## 2025-05-22 DIAGNOSIS — Z95.2 S/P MVR (MITRAL VALVE REPLACEMENT): Primary | ICD-10-CM

## 2025-05-22 DIAGNOSIS — I50.22 CHRONIC SYSTOLIC HEART FAILURE: ICD-10-CM

## 2025-05-22 LAB
POC INR: 1.7 (ref 0.9–1.1)
POC PROTHROMBIN TIME: NORMAL (ref 9.3–12.5)

## 2025-05-22 PROCEDURE — G2211 COMPLEX E/M VISIT ADD ON: HCPCS | Performed by: STUDENT IN AN ORGANIZED HEALTH CARE EDUCATION/TRAINING PROGRAM

## 2025-05-22 PROCEDURE — 3078F DIAST BP <80 MM HG: CPT | Performed by: STUDENT IN AN ORGANIZED HEALTH CARE EDUCATION/TRAINING PROGRAM

## 2025-05-22 PROCEDURE — 99211 OFF/OP EST MAY X REQ PHY/QHP: CPT

## 2025-05-22 PROCEDURE — 1036F TOBACCO NON-USER: CPT | Performed by: STUDENT IN AN ORGANIZED HEALTH CARE EDUCATION/TRAINING PROGRAM

## 2025-05-22 PROCEDURE — 1159F MED LIST DOCD IN RCRD: CPT | Performed by: STUDENT IN AN ORGANIZED HEALTH CARE EDUCATION/TRAINING PROGRAM

## 2025-05-22 PROCEDURE — 3077F SYST BP >= 140 MM HG: CPT | Performed by: STUDENT IN AN ORGANIZED HEALTH CARE EDUCATION/TRAINING PROGRAM

## 2025-05-22 PROCEDURE — 1160F RVW MEDS BY RX/DR IN RCRD: CPT | Performed by: STUDENT IN AN ORGANIZED HEALTH CARE EDUCATION/TRAINING PROGRAM

## 2025-05-22 PROCEDURE — 99214 OFFICE O/P EST MOD 30 MIN: CPT | Performed by: STUDENT IN AN ORGANIZED HEALTH CARE EDUCATION/TRAINING PROGRAM

## 2025-05-22 PROCEDURE — 85610 PROTHROMBIN TIME: CPT | Mod: QW

## 2025-05-22 RX ORDER — PREGABALIN 50 MG/1
50 CAPSULE ORAL 3 TIMES DAILY
Qty: 90 CAPSULE | Refills: 0 | Status: SHIPPED | OUTPATIENT
Start: 2025-05-22 | End: 2025-11-18

## 2025-05-22 RX ORDER — AMOXICILLIN AND CLAVULANATE POTASSIUM 875; 125 MG/1; MG/1
875 TABLET, FILM COATED ORAL 3 TIMES DAILY
Qty: 30 TABLET | Refills: 0 | Status: SHIPPED | OUTPATIENT
Start: 2025-05-22 | End: 2025-06-01

## 2025-05-22 NOTE — PROGRESS NOTES
"Subjective   Patient ID: Neha Tsang is a 79 y.o. female who presents for Follow-up (Discuss diverticulitis, left knee and right ankle pain, and arthritis. 199/76).        HPI  80y/o female with HTN, HPL , CAD s/p CABG , mitral and tricuspid valve repair , atrial flutter ablation and pacemaker placement in 2019, ON Ac, est with coumadin clinic , recurrent diverticulitis , advanced DJD L spine        Abd pain X 3 weeks . LLQ and generalized   H/o recurrent diverticulitis   Ct angio abd from 1/25  reviewed   Also noticed constipation for a week   No blood in stool / melena   On iron supplements   Also experiencing back pain around the same time        Right ankle pain . Bears more weight on the RLE to \" protect \" her left knee . H/o arthroscopic knee procedure         Visit Vitals  /70   Pulse 73   Ht 1.651 m (5' 5\")   Wt 73.9 kg (163 lb)   SpO2 98%   BMI 27.12 kg/m²   OB Status Postmenopausal   Smoking Status Former   BSA 1.84 m²      No LMP recorded. Patient is postmenopausal.   Current Outpatient Medications   Medication Instructions    acetaminophen (Tylenol) 325 mg tablet Every 4 hours PRN    amLODIPine (NORVASC) 10 mg, oral, Daily    amoxicillin-clavulanate (Augmentin) 875-125 mg tablet 875 mg, oral, 3 times daily    aspirin 81 mg EC tablet Take by mouth.    atorvastatin (LIPITOR) 40 mg, oral, Daily    cholecalciferol (Vitamin D-3) 125 MCG (5000 UT) capsule Take by mouth.    ferrous sulfate 325 (65 Fe) MG tablet 65 mg of elemental iron    lisinopril 30 mg, oral, Daily    magnesium oxide (Mag-Ox) 400 mg (241.3 mg magnesium) tablet 1 tablet, Daily    metoprolol tartrate (LOPRESSOR) 50 mg, oral, Every 12 hours    metoprolol tartrate (LOPRESSOR) 50 mg, oral, Every 12 hours    pregabalin (LYRICA) 50 mg, oral, 3 times daily    spironolactone (Aldactone) 25 mg tablet TAKE 1 TABLET(25 MG) BY MOUTH EVERY DAY    warfarin (COUMADIN) 3 mg, oral, Every evening, Take as directed      Social History     Tobacco " Use    Smoking status: Former     Current packs/day: 0.00     Types: Cigarettes     Quit date:      Years since quittin.4    Smokeless tobacco: Never   Substance Use Topics    Alcohol use: Not Currently        Review of Systems    Constitutional : No feeling poorly / fevers/ chills / night sweats/ fatigue     GI As noted in HPI   MSK ; As noted in HPI   Psychiatric: No anxiety/ depression/ SI/HI    All other systems have been reviewed and are negative for complaint       Physical Exam    Constitutional : Vitals reviewed. Alert and in no distress  Cardiovascular : RRR, Normal S1, S2, no peripheral edema   Pulmonary: No respiratory distress, CTAB   Abdomen : Soft to touch , LLQ tenderness , no appreciable  organomegaly   MSK : Normal gait and station , strength and tone   Skin: Warm to touch ,  normal skin turgor   Neurologic : CNs 2-12 grossly intact , no obvious FNDs  Psych : A,Ox3, normal mood and affect      Assessment/Plan   Diagnoses and all orders for this visit:  Acute diverticulitis  -     amoxicillin-clavulanate (Augmentin) 875-125 mg tablet; Take 1 tablet (875 mg) by mouth 3 times a day for 10 days.  Acute right ankle pain  -     Referral to Orthopedics and Sports Medicine; Future  Lumbar spondylosis  -     pregabalin (Lyrica) 50 mg capsule; Take 1 capsule (50 mg) by mouth 3 times a day.  Major depressive disorder, recurrent, moderate  Chronic systolic heart failure    80y/o female with HTN, HPL , CAD s/p CABG , mitral and tricuspid valve repair , atrial flutter ablation and pacemaker placement in 2019, ON Ac, est with coumadin clinic , recurrent diverticulitis , advanced DJD L spine       H/o recurrent diverticulitis with acute onset of sx for the last 3 weeks, rx as above   Recent abd imaging reviewed      Advanced djd of L spine   Was on Tylenol #2 in the past, experienced itching   Did not find pain relief with gabapentin   Has tried lyrica in the past with relief , even if taken prn , ecsa  obtained today   Rx sent  Pdmp reviewed    Fu with ortho for right ankle pain     RTO as previously advised                   Conditions addressed and mgmt as noted above.  Pertinent labs, images/ imaging reports , chart review was done .   Age appropriate labs / labs for mgmt of chronic medical conditions ordered, further mgmt pending the results.         RTO in  m or sooner if needed . Labs to be done few days prior to the next visit.        This note is intended for the physician writing it, as well as to communicate findings to other healthcare professionals. These notes use medical lexicon that may be misunderstood by non medical persons. Therefore, interpretations of medical notes and terminology should be approached with caution.

## 2025-05-22 NOTE — PROGRESS NOTES
Patient identification verified with 2 identifiers.    Location: [facility] at [facility] - [address] [address] [phone] option #1     Referring Physician: Dr Carlos Valencia  Enrollment/ Re-enrollment date: 2025   INR Goal: 2.0-3.0  INR monitoring is per [facility] protocol.  Anticoagulation Medication: warfarin  Indication: Mitral Valve Replacement    Subjective   Bleeding signs/symptoms: No    Bruising: No   Major bleeding event: No  Thrombosis signs/symptoms: No  Thromboembolic event: No  Missed doses: No  Extra doses: No  Medication changes: No  Dietary changes: Yes  Patient reports consuming an increased amount of vitamin K in the form of greens.  Patient confirms that patient is aware that increasing vitamin K consumption will effect warfarin therapy.  Change in health: No  Change in activity: No  Alcohol: No  Other concerns: No    Upcoming Procedures:  Does the Patient Have any upcoming procedures that require interruption in anticoagulation therapy? no  Does the patient require bridging? no      Anticoagulation Summary  As of 2025      INR goal:  2.0-3.0   TTR:  63.6% (1.6 y)   INR used for dosin.70 (2025)   Weekly warfarin total:  21 mg               Assessment/Plan   Subtherapeutic Patient reports consuming an increased amount of vitamin K in the form of greens.  Patient confirms that patient is aware that increasing vitamin K consumption will effect warfarin therapy.     1. New dose: Patient states patient will resume normal vitamin K intake. Patient to take additional one time dose of warfarin today of additional 1.5mg, maintain weekly dose.    2. Next INR: 1 week      Education provided to patient during the visit:  Patient instructed to call in interim with questions, concerns and changes.   Patient educated on interactions between medications and warfarin.   Patient educated on dietary consistency in vitamin k consumption.   Patient  educated on affects of alcohol consumption while taking warfarin.   Patient educated on signs of bleeding/clotting.   Patient educated on compliance with dosing, follow up appointments, and prescribed plan of care.

## 2025-05-29 ENCOUNTER — ANTICOAGULATION - WARFARIN VISIT (OUTPATIENT)
Dept: CARDIOLOGY | Facility: CLINIC | Age: 80
End: 2025-05-29
Payer: MEDICARE

## 2025-05-29 ENCOUNTER — APPOINTMENT (OUTPATIENT)
Dept: CARDIOLOGY | Facility: CLINIC | Age: 80
End: 2025-05-29
Payer: MEDICARE

## 2025-05-29 DIAGNOSIS — Z95.2 S/P MVR (MITRAL VALVE REPLACEMENT): Primary | ICD-10-CM

## 2025-05-30 ENCOUNTER — ANTICOAGULATION - WARFARIN VISIT (OUTPATIENT)
Dept: CARDIOLOGY | Facility: CLINIC | Age: 80
End: 2025-05-30
Payer: MEDICARE

## 2025-05-30 DIAGNOSIS — Z95.2 S/P MVR (MITRAL VALVE REPLACEMENT): Primary | ICD-10-CM

## 2025-05-30 LAB
POC INR: 2.1 (ref 0.9–1.1)
POC PROTHROMBIN TIME: ABNORMAL (ref 9.3–12.5)

## 2025-05-30 PROCEDURE — 85610 PROTHROMBIN TIME: CPT | Mod: QW

## 2025-05-30 PROCEDURE — 99211 OFF/OP EST MAY X REQ PHY/QHP: CPT

## 2025-05-30 NOTE — PROGRESS NOTES
Patient identification verified with 2 identifiers.    Location: [facility] at [facility] - [address] [address] [phone] option #1     Referring Physician: DR. HERRING  Enrollment/ Re-enrollment date: 25   INR Goal: 2.0-3.0  INR monitoring is per [facility] protocol.  Anticoagulation Medication: warfarin  Indication: Mitral Valve Replacement    Subjective   Bleeding signs/symptoms: No    Bruising: No   Major bleeding event: No  Thrombosis signs/symptoms: No  Thromboembolic event: No  Missed doses: No  Extra doses: No  Medication changes: Yes  PT HAS 2 MORE DAYS OF ATBS.  SHE IS TAKING LYRICA  AS NEEDED THROUGHOUT THE DAY--1-3 TIMES.  Dietary changes: No  Change in health: No  Change in activity: No  Alcohol: No  Other concerns: No    Upcoming Procedures:  Does the Patient Have any upcoming procedures that require interruption in anticoagulation therapy? no  Does the patient require bridging? no      Anticoagulation Summary  As of 2025      INR goal:  2.0-3.0   TTR:  63.1% (1.6 y)   INR used for dosin.10 (2025)   Weekly warfarin total:  21 mg               Assessment/Plan   Therapeutic     1. New dose: no change    2. Next INR: 1 week      Education provided to patient during the visit:  Patient instructed to call in interim with questions, concerns and changes.   Patient educated on interactions between medications and warfarin.   Patient educated on dietary consistency in vitamin k consumption.   Patient educated on signs of bleeding/clotting.

## 2025-06-03 ENCOUNTER — APPOINTMENT (OUTPATIENT)
Dept: ORTHOPEDIC SURGERY | Facility: CLINIC | Age: 80
End: 2025-06-03
Payer: MEDICARE

## 2025-06-03 DIAGNOSIS — M51.360 DEGENERATION OF INTERVERTEBRAL DISC OF LUMBAR REGION WITH DISCOGENIC BACK PAIN: Primary | ICD-10-CM

## 2025-06-03 PROCEDURE — 99203 OFFICE O/P NEW LOW 30 MIN: CPT | Performed by: PHYSICIAN ASSISTANT

## 2025-06-03 PROCEDURE — 1159F MED LIST DOCD IN RCRD: CPT | Performed by: PHYSICIAN ASSISTANT

## 2025-06-03 PROCEDURE — 1125F AMNT PAIN NOTED PAIN PRSNT: CPT | Performed by: PHYSICIAN ASSISTANT

## 2025-06-03 RX ORDER — LIDOCAINE 50 MG/G
1 PATCH TOPICAL DAILY
Qty: 30 PATCH | Refills: 0 | Status: SHIPPED | OUTPATIENT
Start: 2025-06-03 | End: 2025-06-13

## 2025-06-03 RX ORDER — PREDNISONE 20 MG/1
40 TABLET ORAL DAILY
Qty: 10 TABLET | Refills: 0 | Status: SHIPPED | OUTPATIENT
Start: 2025-06-03 | End: 2025-06-08

## 2025-06-03 ASSESSMENT — PAIN - FUNCTIONAL ASSESSMENT: PAIN_FUNCTIONAL_ASSESSMENT: 0-10

## 2025-06-03 ASSESSMENT — PAIN SCALES - GENERAL: PAINLEVEL_OUTOF10: 3

## 2025-06-03 NOTE — PROGRESS NOTES
HPI:  Neha Tsang is a 79 y.o. female who presents to the spine clinic today for evaluation of chronic low back pain.     Reports chronic low back pain, increased over the past 1-2 years. Reports history of injury to her tailbone after a fall approx 40 years ago which she was treated with injections for several years, fortunately that pain improved after time.     No new injury or trauma. Pain midline low back and across the belt line. Denies tailbone/sacral pain. Notes low back stiffness & decreased ROM. Has difficulty getting up/down from a seated position and trouble getting in/out of a car.   Denies lower extremity radicular pain/numbness/tingling. She does have instability left knee s/p arthroscopy and the knee occasionally gives out but no additional weakness in the lower extremities. No bowel or bladder dysfunction.     She has implanted cardiac pacemaker and is on Coumadin therapy.   No recent PT or spine injections.   She takes Lyrica 50mg TID and Tylenol prn for pain.     ROS:  Reviewed on EMR and patient intake sheet.    PMH/SH:  Reviewed on EMR and patient intake sheet.    Exam:  Physical Exam  Spine Musculoskeletal Exam    Well appearing, NAD  Pleasant & cooperative  BMI 27.12  Decreased ROM lumbar spine with rotation, flexion/extension  + paraspinal muscle spasms  lower extremity sensation intact to light touch  lower extremity motor 5/5 throughout; no focal motor weakness  antalgic gait & station; assistive devices: none  SLR: neg  Reflexes: hypo    Radiology:     Xrays lumbar spine dated 01/08/25 personally reviewed, along with the accompanying report.   Mild thoracolumbar scoliosis. No fractures or spondylolisthesis. Advanced disc degeneration and spondylotic changes L4-5, L5-S1      Assessment and Plan:  1. Degeneration of intervertebral disc of lumbar region with discogenic back pain (Primary)  - Referral to Physical Therapy; Future  - predniSONE (Deltasone) 20 mg tablet; Take 2 tablets  (40 mg) by mouth once daily for 5 days.  Dispense: 10 tablet; Refill: 0  - lidocaine (Lidoderm) 5 % patch; Place 1 patch over 12 hours on the skin once daily for 10 days. Remove & discard patch within 12 hours or as directed by MD.  Dispense: 30 patch; Refill: 0    I reviewed the imaging studies with Neha Tsang in detail today. Patient with low back pain that does not radiate into the lower extremities. We discussed the pathology and natural course of degenerative disc disease. I educated the patient on several lifestyle modifications that include increased walking, maintaining a healthy weight, non-smoking, and a routine exercise plan that includes core strengthening. The patient was educated that this pain may fluctuate over time.     I recommended a short course of prednisone and for pain relief. She cannot take NSAIDs and so recommend Tylenol prn for pain. We also discussed utilizing heat application and topical lidocaine patches. Referral for physical therapy provided today.     Patient in agreement to plan of care. Of course Neha Tsang is welcome to call at anytime with questions or concerns.     Penny Pinzon PA-C  Department of Orthopaedic Surgery    [address] [place] [address]    Voicemail: [phone]   Appts: [phone]  Fax: [phone]

## 2025-06-04 ENCOUNTER — APPOINTMENT (OUTPATIENT)
Dept: ORTHOPEDIC SURGERY | Facility: CLINIC | Age: 80
End: 2025-06-04
Payer: MEDICARE

## 2025-06-04 DIAGNOSIS — M19.90 OSTEOARTHRITIS, CHRONIC: ICD-10-CM

## 2025-06-05 ENCOUNTER — ANTICOAGULATION - WARFARIN VISIT (OUTPATIENT)
Dept: CARDIOLOGY | Facility: CLINIC | Age: 80
End: 2025-06-05
Payer: MEDICARE

## 2025-06-05 DIAGNOSIS — Z95.2 S/P MVR (MITRAL VALVE REPLACEMENT): Primary | ICD-10-CM

## 2025-06-05 LAB
POC INR: 1.7 (ref 0.9–1.1)
POC PROTHROMBIN TIME: ABNORMAL (ref 9.3–12.5)

## 2025-06-05 PROCEDURE — 85610 PROTHROMBIN TIME: CPT | Mod: QW

## 2025-06-05 PROCEDURE — 99211 OFF/OP EST MAY X REQ PHY/QHP: CPT

## 2025-06-05 NOTE — PROGRESS NOTES
Patient identification verified with 2 identifiers.    Location: [facility] at [facility] - [address] [address] [phone] option #1     Referring Physician: DR. HERRING  Enrollment/ Re-enrollment date: 25   INR Goal: 2.0-3.0  INR monitoring is per [facility] protocol.  Anticoagulation Medication: warfarin  Indication: Mitral Valve Replacement      Subjective   Bleeding signs/symptoms: No  Bruising: No   Major bleeding event: No  Thrombosis signs/symptoms: No  Thromboembolic event: No  Missed doses: No  Extra doses: No  Medication changes: Yes  Pt states she will begin taking prescription steroids soon as prescribed by PCP  Dietary changes: No  Change in health: Yes  Back pain  Change in activity: No  Alcohol: No  Other concerns: No    Upcoming Procedures:  Does the Patient Have any upcoming procedures that require interruption in anticoagulation therapy? no  Does the patient require bridging? no      Anticoagulation Summary  As of 2025      INR goal:  2.0-3.0   TTR:  62.7% (1.6 y)   INR used for dosin.70 (2025)   Weekly warfarin total:  24 mg               Assessment/Plan   Subtherapeutic     1. New dose: TWD increased by approx. 10% per protocol.     2. Next INR: 1 week      Education provided to patient during the visit:  Patient instructed to call in interim with questions, concerns and changes.   Patient educated on interactions between medications and warfarin.   Patient educated on dietary consistency in vitamin k consumption.   Patient educated on affects of alcohol consumption while taking warfarin.   Patient educated on signs of bleeding/clotting.   Patient educated on compliance with dosing, follow up appointments, and prescribed plan of care.

## 2025-06-12 ENCOUNTER — ANTICOAGULATION - WARFARIN VISIT (OUTPATIENT)
Dept: CARDIOLOGY | Facility: CLINIC | Age: 80
End: 2025-06-12
Payer: MEDICARE

## 2025-06-12 DIAGNOSIS — M47.816 LUMBAR SPONDYLOSIS: ICD-10-CM

## 2025-06-12 DIAGNOSIS — Z95.2 S/P MVR (MITRAL VALVE REPLACEMENT): Primary | ICD-10-CM

## 2025-06-12 LAB
POC INR: 2.9 (ref 0.9–1.1)
POC PROTHROMBIN TIME: ABNORMAL (ref 9.3–12.5)

## 2025-06-12 PROCEDURE — 99211 OFF/OP EST MAY X REQ PHY/QHP: CPT

## 2025-06-12 PROCEDURE — 85610 PROTHROMBIN TIME: CPT | Mod: QW

## 2025-06-12 NOTE — PROGRESS NOTES
Patient identification verified with 2 identifiers.    Location: [facility] at [facility] - [address] [address] [phone] option #1     Referring Physician: Dr. Carlos Valencia  Enrollment/ Re-enrollment date: 25   INR Goal: 2.0-3.0  INR monitoring is per [facility] protocol.  Anticoagulation Medication: warfarin  Indication: Mitral Valve Replacement    Subjective   Bleeding signs/symptoms: No    Bruising: No   Major bleeding event: No  Thrombosis signs/symptoms: No  Thromboembolic event: No  Missed doses: No  Extra doses: No  Medication changes: No  Dietary changes: No  Change in health: No  Change in activity: No  Alcohol: No  Other concerns: No    Upcoming Procedures:  Does the Patient Have any upcoming procedures that require interruption in anticoagulation therapy? no  Does the patient require bridging? no      Anticoagulation Summary  As of 2025      INR goal:  2.0-3.0   TTR:  62.8% (1.7 y)   INR used for dosin.90 (2025)   Weekly warfarin total:  22.5 mg               Assessment/Plan   Therapeutic     1. New dose: will slightly decrease dosage as patient will be starting steroid medication tomorrow.      2. Next INR: 1 week      Education provided to patient during the visit:  Patient instructed to call in interim with questions, concerns and changes.   Patient educated on interactions between medications and warfarin.   Patient educated on dietary consistency in vitamin k consumption.   Patient educated on affects of alcohol consumption while taking warfarin.   Patient educated on signs of bleeding/clotting.   Patient educated on compliance with dosing, follow up appointments, and prescribed plan of care.

## 2025-06-13 ENCOUNTER — APPOINTMENT (OUTPATIENT)
Dept: ORTHOPEDIC SURGERY | Facility: CLINIC | Age: 80
End: 2025-06-13
Payer: MEDICARE

## 2025-06-13 ENCOUNTER — TELEPHONE (OUTPATIENT)
Dept: PRIMARY CARE | Facility: CLINIC | Age: 80
End: 2025-06-13

## 2025-06-13 RX ORDER — PREGABALIN 50 MG/1
50 CAPSULE ORAL 3 TIMES DAILY
Qty: 90 CAPSULE | Refills: 0 | OUTPATIENT
Start: 2025-06-13 | End: 2025-12-10

## 2025-06-13 NOTE — TELEPHONE ENCOUNTER
Patient states she takes it once a day but if she is in pain she takes it twice a day. Patient states the pharmacy only gave her 15 pills when she came and was told to come back for the 90 today.

## 2025-06-19 ENCOUNTER — ANTICOAGULATION - WARFARIN VISIT (OUTPATIENT)
Dept: CARDIOLOGY | Facility: CLINIC | Age: 80
End: 2025-06-19
Payer: MEDICARE

## 2025-06-19 DIAGNOSIS — Z95.2 S/P MVR (MITRAL VALVE REPLACEMENT): Primary | ICD-10-CM

## 2025-06-19 LAB
POC INR: 2.5 (ref 0.9–1.1)
POC PROTHROMBIN TIME: ABNORMAL (ref 9.3–12.5)

## 2025-06-19 PROCEDURE — 85610 PROTHROMBIN TIME: CPT | Mod: QW

## 2025-06-19 PROCEDURE — 99211 OFF/OP EST MAY X REQ PHY/QHP: CPT

## 2025-06-19 NOTE — PROGRESS NOTES
Patient identification verified with 2 identifiers.    Location: [facility] at [facility] - [address] [address] [phone] option #1     Referring Physician: DR. FARAZ HERRING  Enrollment/ Re-enrollment date: 2025   INR Goal: 2.0-3.0  INR monitoring is per [facility] protocol.  Anticoagulation Medication: warfarin  Indication: Mitral Valve Replacement    Subjective   Bleeding signs/symptoms: No    Bruising: No   Major bleeding event: No  Thrombosis signs/symptoms: No  Thromboembolic event: No  Missed doses: No  Extra doses: No  Medication changes: Yes  PT DID NOT START STEROIDS. SHE CONTINUES TO TAKE LYRICA AS NEEDED  Dietary changes: No  Change in health: No  Change in activity: No  Alcohol: No  Other concerns: No    Upcoming Procedures:  Does the Patient Have any upcoming procedures that require interruption in anticoagulation therapy? no  Does the patient require bridging? no      Anticoagulation Summary  As of 2025      INR goal:  2.0-3.0   TTR:  63.3% (1.7 y)   INR used for dosin.50 (2025)   Weekly warfarin total:  22.5 mg               Assessment/Plan   Therapeutic     1. New dose: no change    2. Next INR: 2 weeks      Education provided to patient during the visit:  Patient instructed to call in interim with questions, concerns and changes.   Patient educated on interactions between medications and warfarin.   Patient educated on compliance with dosing, follow up appointments, and prescribed plan of care.

## 2025-06-27 ENCOUNTER — APPOINTMENT (OUTPATIENT)
Dept: OPHTHALMOLOGY | Facility: CLINIC | Age: 80
End: 2025-06-27
Payer: MEDICARE

## 2025-07-01 ENCOUNTER — ANTICOAGULATION - WARFARIN VISIT (OUTPATIENT)
Dept: CARDIOLOGY | Facility: CLINIC | Age: 80
End: 2025-07-01
Payer: MEDICARE

## 2025-07-01 DIAGNOSIS — Z95.2 S/P MVR (MITRAL VALVE REPLACEMENT): Primary | ICD-10-CM

## 2025-07-01 LAB
POC INR: 1.7 (ref 0.9–1.1)
POC PROTHROMBIN TIME: ABNORMAL (ref 9.3–12.5)

## 2025-07-01 PROCEDURE — 85610 PROTHROMBIN TIME: CPT | Mod: QW

## 2025-07-01 PROCEDURE — 99211 OFF/OP EST MAY X REQ PHY/QHP: CPT

## 2025-07-01 NOTE — PROGRESS NOTES
Patient identification verified with 2 identifiers.    Location: [facility] at [facility] - [address] [address] [phone] option #1     Referring Physician: Dr. Carlos Valencia  Enrollment/ Re-enrollment date: 2025   INR Goal: 2.0-3.0  INR monitoring is per [facility] protocol.  Anticoagulation Medication: warfarin  Indication: Mitral Valve Replacement    Subjective   Bleeding signs/symptoms: No    Bruising: No   Major bleeding event: No  Thrombosis signs/symptoms: No  Thromboembolic event: No  Missed doses: Yes  Pt stated missed a dose three days ago  Extra doses: No  Medication changes: No  Pt continues to take Lyrica 1 - 2 tabs daily  Dietary changes: Yes  Pt stated she's been eating more green vegatables and will continue to eat them  Change in health: No  Change in activity: No  Alcohol: No  Other concerns: No    Upcoming Procedures:  Does the Patient Have any upcoming procedures that require interruption in anticoagulation therapy? no  Does the patient require bridging? no      Anticoagulation Summary  As of 2025      INR goal:  2.0-3.0   TTR:  63.3% (1.7 y)   INR used for dosin.70 (2025)   Weekly warfarin total:  24 mg               Assessment/Plan   Subtherapeutic     1. New dose: Will increase weekly dose    2. Next INR: 1 week      Education provided to patient during the visit:  Patient instructed to call in interim with questions, concerns and changes.   Patient educated on interactions between medications and warfarin.   Patient educated on signs of bleeding/clotting.

## 2025-07-08 ENCOUNTER — ANTICOAGULATION - WARFARIN VISIT (OUTPATIENT)
Dept: CARDIOLOGY | Facility: CLINIC | Age: 80
End: 2025-07-08
Payer: MEDICARE

## 2025-07-08 DIAGNOSIS — Z95.2 S/P MVR (MITRAL VALVE REPLACEMENT): Primary | ICD-10-CM

## 2025-07-08 LAB
POC INR: 1.6 (ref 0.9–1.1)
POC PROTHROMBIN TIME: ABNORMAL (ref 9.3–12.5)

## 2025-07-08 PROCEDURE — 99211 OFF/OP EST MAY X REQ PHY/QHP: CPT

## 2025-07-08 PROCEDURE — 85610 PROTHROMBIN TIME: CPT | Mod: QW

## 2025-07-08 NOTE — PROGRESS NOTES
Patient identification verified with 2 identifiers.    Location: [facility] at [facility] - [address] [address] [phone] option #1     Referring Physician:DR. HERRING  Enrollment/ Re-enrollment date: 26   INR Goal: 2.0-3.0  INR monitoring is per [facility] protocol.  Anticoagulation Medication: warfarin  Indication: Mitral Valve Replacement    Subjective   Bleeding signs/symptoms: No    Bruising: No   Major bleeding event: No  Thrombosis signs/symptoms: No  Thromboembolic event: No  Missed doses: No  Extra doses: No  Medication changes: No  Dietary changes: No  Change in health: No  Change in activity: No  Alcohol: No  Other concerns: No    Upcoming Procedures:  Does the Patient Have any upcoming procedures that require interruption in anticoagulation therapy? no  Does the patient require bridging? no      Anticoagulation Summary  As of 2025      INR goal:  2.0-3.0   TTR:  62.6% (1.7 y)   INR used for dosin.60 (2025)   Weekly warfarin total:  25.5 mg               Assessment/Plan   Subtherapeutic     1. New dose: PT WILL INCREASE WEEKLY DOSE.     2. Next INR: 1 week      Education provided to patient during the visit:  Patient instructed to call in interim with questions, concerns and changes.   Patient educated on interactions between medications and warfarin.   Patient educated on dietary consistency in vitamin k consumption.   Patient educated on signs of bleeding/clotting.

## 2025-07-15 ENCOUNTER — ANTICOAGULATION - WARFARIN VISIT (OUTPATIENT)
Dept: CARDIOLOGY | Facility: CLINIC | Age: 80
End: 2025-07-15
Payer: MEDICARE

## 2025-07-15 DIAGNOSIS — Z95.2 S/P MVR (MITRAL VALVE REPLACEMENT): Primary | ICD-10-CM

## 2025-07-15 LAB
POC INR: 2.6 (ref 0.9–1.1)
POC PROTHROMBIN TIME: ABNORMAL (ref 9.3–12.5)

## 2025-07-15 PROCEDURE — 85610 PROTHROMBIN TIME: CPT | Mod: QW

## 2025-07-15 PROCEDURE — 99211 OFF/OP EST MAY X REQ PHY/QHP: CPT | Performed by: INTERNAL MEDICINE

## 2025-07-15 NOTE — PROGRESS NOTES
Patient identification verified with 2 identifiers.    Location: [facility] at [facility] - [address] [address] [phone] option #1     Referring Physician: Dr. Carlos Valencia  Enrollment/ Re-enrollment date: 2026   INR Goal: 2.0-3.0  INR monitoring is per [facility] protocol.  Anticoagulation Medication: warfarin  Indication: Mitral Valve Replacement    Subjective   Bleeding signs/symptoms: No    Bruising: No   Major bleeding event: No  Thrombosis signs/symptoms: No  Thromboembolic event: No  Missed doses: No  Extra doses: No  Medication changes: No  Dietary changes: Yes  Pt reports hasn't been eating enough vitamin K and would like to incorporate more in her diet  Change in health: No  Change in activity: No  Alcohol: No  Other concerns: No    Upcoming Procedures:  Does the Patient Have any upcoming procedures that require interruption in anticoagulation therapy? no  Does the patient require bridging? no      Anticoagulation Summary  As of 7/15/2025      INR goal:  2.0-3.0   TTR:  62.5% (1.8 y)   INR used for dosin.60 (7/15/2025)   Weekly warfarin total:  25.5 mg               Assessment/Plan   Therapeutic     1. New dose: no change    Patient stated wants to eat more green vegetables this week.   2. Next INR: 1 week      Education provided to patient during the visit:  Patient instructed to call in interim with questions, concerns and changes.   Patient educated on dietary consistency in vitamin k consumption.   Patient educated on signs of bleeding/clotting.   Patient educated on compliance with dosing, follow up appointments, and prescribed plan of care.

## 2025-07-16 ENCOUNTER — APPOINTMENT (OUTPATIENT)
Dept: ORTHOPEDIC SURGERY | Facility: CLINIC | Age: 80
End: 2025-07-16
Payer: MEDICARE

## 2025-07-16 ENCOUNTER — HOSPITAL ENCOUNTER (OUTPATIENT)
Dept: RADIOLOGY | Facility: CLINIC | Age: 80
Discharge: HOME | End: 2025-07-16
Payer: MEDICARE

## 2025-07-16 DIAGNOSIS — M25.562 LEFT KNEE PAIN, UNSPECIFIED CHRONICITY: ICD-10-CM

## 2025-07-16 DIAGNOSIS — M17.5 OTHER SECONDARY OSTEOARTHRITIS OF LEFT KNEE: Primary | ICD-10-CM

## 2025-07-16 PROCEDURE — 99214 OFFICE O/P EST MOD 30 MIN: CPT | Performed by: STUDENT IN AN ORGANIZED HEALTH CARE EDUCATION/TRAINING PROGRAM

## 2025-07-16 PROCEDURE — 73564 X-RAY EXAM KNEE 4 OR MORE: CPT | Mod: LT

## 2025-07-16 PROCEDURE — 73564 X-RAY EXAM KNEE 4 OR MORE: CPT | Mod: LEFT SIDE | Performed by: RADIOLOGY

## 2025-07-16 PROCEDURE — 20610 DRAIN/INJ JOINT/BURSA W/O US: CPT | Performed by: STUDENT IN AN ORGANIZED HEALTH CARE EDUCATION/TRAINING PROGRAM

## 2025-07-16 RX ORDER — TRIAMCINOLONE ACETONIDE 40 MG/ML
40 INJECTION, SUSPENSION INTRA-ARTICULAR; INTRAMUSCULAR
Status: COMPLETED | OUTPATIENT
Start: 2025-07-16 | End: 2025-07-16

## 2025-07-16 RX ORDER — LIDOCAINE HYDROCHLORIDE 10 MG/ML
4 INJECTION, SOLUTION INFILTRATION; PERINEURAL
Status: COMPLETED | OUTPATIENT
Start: 2025-07-16 | End: 2025-07-16

## 2025-07-16 RX ADMIN — LIDOCAINE HYDROCHLORIDE 4 ML: 10 INJECTION, SOLUTION INFILTRATION; PERINEURAL at 10:05

## 2025-07-16 RX ADMIN — TRIAMCINOLONE ACETONIDE 40 MG: 40 INJECTION, SUSPENSION INTRA-ARTICULAR; INTRAMUSCULAR at 10:05

## 2025-07-16 NOTE — PROGRESS NOTES
Neha Tsang  is a 79 y.o. year-old  female. she  is a new patient to our office and presents with a chief complaint of Left  knee pain.  Has a history of a knee arthroscopy in 2008 for lateral meniscus tear.  Had been doing well up until the last couple months where she began having more pain on the lateral aspect of her knee and felt like the knee was giving out.  She also has mechanical symptoms.      Past Medical, Family, and Social History reviewed   Review of Systems  A complete review of systems was conducted, pertinent only to the HPI noted above.  Constitutional: None  Eyes: No additions to above history  Ears, Nose, Throat: No additions to above history  Cardiovascular: No additions to above history  Respiratory: No additions to above history  GI: No additions to above history  : No additions to above history  Skin/Neuro: No additions to above history  Endocrine/Heme/Lymph: No additions to above history  Immunologic: No additions to above history  Psychiatric: No additions to above history  Musculoskeletal: see above    GEN: Alert and Oriented x 3  Constitutional: Well appearing , in no apparent distress.  Skin: No rashes, erythema, or induration around knee    MUSCULOSKELETAL EXAM:     Left KNEE:  ROM:   Effusion: positive  Alignment: [neutral]      Gait: [normal]  Sensation intact bilaterally sural/saphenous/sp/dp/tibial nerve bilaterally  Motor 5/5 knee flexion/extension/foot DF/PF/EHL/FHL bilaterally  Palpable/symmetric DP and PT pulse bilaterally      PATELLAR/EXTENSOR MECHANISM:   KNEE:  Patellar Crepitus: n  Patellar Compression Pain:n  Patellar Apprehension: [no]  Extensor Mechanism: [intact]  Straight Leg Raise: good      LIGAMENTS:  ACL: Lachmans: [negative]  PCL: [stable]  Valgus at 0 degrees: [stable]  Valgus at 30 degrees: [stable]  Varus at 0 degrees: [stable]  Varus at 30 degrees: [stable]    MENISCUS EXAM:  Joint Line Tenderness:medial joint line tenderness  McMurrays:  [negative]  Pain with Deep Flexion: [No]    Xrays independently viewed and interpreted: Degenerative changes of the knee particular lateral compartment with likely previously collapsed insufficiency fracture    L Inj/Asp: L knee on 7/16/2025 10:05 AM  Indications: pain  Details: 21 G needle, anterolateral approach  Medications: 40 mg triamcinolone acetonide 40 mg/mL; 4 mL lidocaine 10 mg/mL (1 %)  Outcome: tolerated well, no immediate complications  Procedure, treatment alternatives, risks and benefits explained, specific risks discussed. Consent was given by the patient. Immediately prior to procedure a time out was called to verify the correct patient, procedure, equipment, support staff and site/side marked as required. Patient was prepped and draped in the usual sterile fashion.           The patient history, physical examination and imaging studies are consistent with knee arthritis. The treatment options including NSAIDs, activity modification, PT (including the importance of obtaining full motion), and weight loss were discussed at length today. I have also suggested a potential for IA injection with cortisone and have provided today at her  request. I have discussed the potential need for arthroplasty in the future. The patient acknowledged the current plan.     Follow up will be 3 months if repeat injection indicated.

## 2025-07-21 ENCOUNTER — HOSPITAL ENCOUNTER (OUTPATIENT)
Dept: CARDIOLOGY | Facility: CLINIC | Age: 80
Discharge: HOME | End: 2025-07-21
Payer: MEDICARE

## 2025-07-21 DIAGNOSIS — Z95.0 PRESENCE OF CARDIAC PACEMAKER: ICD-10-CM

## 2025-07-21 DIAGNOSIS — I44.2 ATRIOVENTRICULAR BLOCK, COMPLETE (MULTI): ICD-10-CM

## 2025-07-21 PROCEDURE — 93296 REM INTERROG EVL PM/IDS: CPT

## 2025-07-21 PROCEDURE — 93294 REM INTERROG EVL PM/LDLS PM: CPT | Performed by: INTERNAL MEDICINE

## 2025-07-22 ENCOUNTER — APPOINTMENT (OUTPATIENT)
Dept: CARDIOLOGY | Facility: CLINIC | Age: 80
End: 2025-07-22
Payer: MEDICARE

## 2025-07-22 ENCOUNTER — ANTICOAGULATION - WARFARIN VISIT (OUTPATIENT)
Dept: CARDIOLOGY | Facility: CLINIC | Age: 80
End: 2025-07-22
Payer: MEDICARE

## 2025-07-22 DIAGNOSIS — Z95.0 PRESENCE OF CARDIAC PACEMAKER: ICD-10-CM

## 2025-07-22 DIAGNOSIS — Z95.2 S/P MVR (MITRAL VALVE REPLACEMENT): Primary | ICD-10-CM

## 2025-07-22 DIAGNOSIS — I44.2 ATRIOVENTRICULAR BLOCK, COMPLETE (MULTI): Primary | ICD-10-CM

## 2025-07-24 ENCOUNTER — ANTICOAGULATION - WARFARIN VISIT (OUTPATIENT)
Dept: CARDIOLOGY | Facility: CLINIC | Age: 80
End: 2025-07-24
Payer: MEDICARE

## 2025-07-24 DIAGNOSIS — Z95.2 S/P MVR (MITRAL VALVE REPLACEMENT): Primary | ICD-10-CM

## 2025-07-24 LAB
POC INR: 2.3 (ref 0.9–1.1)
POC PROTHROMBIN TIME: NORMAL (ref 9.3–12.5)

## 2025-07-24 PROCEDURE — 85610 PROTHROMBIN TIME: CPT | Mod: QW | Performed by: INTERNAL MEDICINE

## 2025-07-24 PROCEDURE — 99211 OFF/OP EST MAY X REQ PHY/QHP: CPT | Performed by: INTERNAL MEDICINE

## 2025-07-24 NOTE — PROGRESS NOTES
Patient identification verified with 2 identifiers.    Location: [facility] at [facility] - [address] [address] [phone] option #1     Referring Physician: Dr. Carlos Valencia   Enrollment/ Re-enrollment date: 2026   INR Goal: 2.0-3.0  INR monitoring is per AMS protocol.  Anticoagulation Medication: warfarin  Indication: Mitral Valve Replacement    Subjective   Bleeding signs/symptoms: No    Bruising: No   Major bleeding event: No  Thrombosis signs/symptoms: No  Thromboembolic event: No  Missed doses: No  Extra doses: No  Medication changes: Yes  Pt reported received a cortisol injection   Dietary changes: No  Change in health: No  Change in activity: No  Alcohol: No  Other concerns: No    Upcoming Procedures:  Does the Patient Have any upcoming procedures that require interruption in anticoagulation therapy? no  Does the patient require bridging? no      Anticoagulation Summary  As of 2025      INR goal:  2.0-3.0   TTR:  63.0% (1.8 y)   INR used for dosin.30 (2025)   Weekly warfarin total:  25.5 mg               Assessment/Plan   Therapeutic     1. New dose: no change    2. Next INR: 1 week      Education provided to patient during the visit:  Patient instructed to call in interim with questions, concerns and changes.   Patient educated on interactions between medications and warfarin.   Patient educated on dietary consistency in vitamin k consumption.   Patient educated on signs of bleeding/clotting.   Patient educated on compliance with dosing, follow up appointments, and prescribed plan of care.

## 2025-07-29 DIAGNOSIS — I48.3 TYPICAL ATRIAL FLUTTER (MULTI): ICD-10-CM

## 2025-07-29 RX ORDER — WARFARIN 3 MG/1
3 TABLET ORAL EVERY EVENING
Qty: 90 TABLET | Refills: 0 | Status: SHIPPED | OUTPATIENT
Start: 2025-07-29

## 2025-07-31 ENCOUNTER — ANTICOAGULATION - WARFARIN VISIT (OUTPATIENT)
Dept: CARDIOLOGY | Facility: CLINIC | Age: 80
End: 2025-07-31
Payer: MEDICARE

## 2025-07-31 DIAGNOSIS — Z95.2 S/P MVR (MITRAL VALVE REPLACEMENT): Primary | ICD-10-CM

## 2025-07-31 LAB
POC INR: 2.3 (ref 0.9–1.1)
POC PROTHROMBIN TIME: ABNORMAL (ref 9.3–12.5)

## 2025-07-31 PROCEDURE — 99211 OFF/OP EST MAY X REQ PHY/QHP: CPT

## 2025-07-31 PROCEDURE — 85610 PROTHROMBIN TIME: CPT | Mod: QW | Performed by: INTERNAL MEDICINE

## 2025-07-31 NOTE — PROGRESS NOTES
Patient identification verified with 2 identifiers.    Location: [facility] at [facility] - [address] [address] [phone] option #1     Referring Physician: DR. FARAZ HERRING  Enrollment/ Re-enrollment date: 2026   INR Goal: 2.0-3.0  INR monitoring is per AMS protocol.  Anticoagulation Medication: warfarin  Indication: Mitral Valve Replacement    Subjective   Bleeding signs/symptoms: No    Bruising: No   Major bleeding event: No  Thrombosis signs/symptoms: No  Thromboembolic event: No  Missed doses: No  Extra doses: No  Medication changes: No  Dietary changes: Yes  PT HAD SOME CRANBERRY SAUCE  Change in health: No  Change in activity: No  Alcohol: No  Other concerns: No    Upcoming Procedures:  Does the Patient Have any upcoming procedures that require interruption in anticoagulation therapy? no  Does the patient require bridging? no      Anticoagulation Summary  As of 2025      INR goal:  2.0-3.0   TTR:  63.4% (1.8 y)   INR used for dosin.30 (2025)   Weekly warfarin total:  25.5 mg               Assessment/Plan   Therapeutic     1. New dose: no change    2. Next INR: 2 weeks      Education provided to patient during the visit:  Patient instructed to call in interim with questions, concerns and changes.   Patient educated on compliance with dosing, follow up appointments, and prescribed plan of care.

## 2025-08-06 PROBLEM — M25.562 CHRONIC PAIN OF LEFT KNEE: Status: ACTIVE | Noted: 2025-08-06

## 2025-08-06 PROBLEM — G89.29 CHRONIC PAIN OF LEFT KNEE: Status: ACTIVE | Noted: 2025-08-06

## 2025-08-06 NOTE — PROGRESS NOTES
Physical Therapy Evaluation and Treatment    Patient Name: Neha Tsang  MRN: [id number]  YOB: 1945  Encounter Date: 8/7/2025    Time Entry:  Time Calculation  Start Time: 0905  Stop Time: 1005  Time Calculation (min): 60 min  PT Evaluation Time Entry  PT Evaluation High Complexity Time Entry: 40  PT Therapeutic Procedures Time Entry  Therapeutic Exercise Time Entry: 20                   Rehab Insurance Information:    08/06/2025 / MEDICARE + MMO MED SUPP / NO AUTH / NO OOP / MED NEC / AVAILITY # [id number]                Rehab Falls Risk Assessment:       Problem List Items Addressed This Visit           ICD-10-CM    Chronic pain of left knee - Primary M25.562, G89.29    Relevant Orders    Follow Up In Physical Therapy     Problem List Items Addressed This Visit           ICD-10-CM    Chronic low back pain without sciatica M54.50, G89.29    Chronic pain of left knee - Primary M25.562, G89.29    Relevant Orders    Follow Up In Physical Therapy    n       Subjective      History of Present Illness  Neha is a 79 y.o. female who reports to physical therapy with a chief concern of .pt  has multiple joint pain complaints including lumbar, L knee, and L ankle. She c/o that she cannot forward bend and getting in and out of car is difficult..                 History of Present Condition/Illness: .pt has multiple joint pain complaints including lumbar, L knee, and L ankle. She c/o that she cannot forward bend and getting in and out of car is difficult. She has multiple medical issues. She had a L knee knee steroid injection 3 weeks ago and it helped. She had CABGx5 and pacemaker  in 2019.    Activities of Daily Living            Patient Responsibilities: Driving, Health management          Currently independent with activities of daily living? Yes              Pain     Patient reports a current pain level of 2/10.  Pain at worst is reported as 7/10.   Location: L knee  Clinical Progression (since  "onset): Stable  Pain Qualities: Sharp  Pain-Relieving Factors: Lying down  Pain-Aggravating Factors: Bending, Stair climbing         Living Arrangements  Living Situation  Housing: Home with care/services  Type of Care/Services Provided at Home: Family care  Living Arrangements: Alone  Support Systems: Family members    Home Setup  Type of Structure: Apartment/condo  Home Access: Elevator  Number of Levels in Home: One level  Existing Accessibility Options: Elevator           Objective         Lower Extremity Sensation  Right Lumbar/Lower Extremity Sensation  Intact: Light Touch                   Lumbar Range of Motion   Neha Tsang prefers not to move her lumbar spine secondary to \" it gives out\"    Hip Range of Motion   Right Hip   Active (deg) Passive (deg) Pain   Flexion   100     Extension         ABduction   30     ADduction         External Rotation 90/90   40     External Rotation Prone         Internal Rotation 90/90   20     Internal Rotation Prone           Left Hip   Active (deg) Passive (deg) Pain   Flexion   100 Yes   Extension         ABduction   30     ADduction         External Rotation 90/90   40     External Rotation Prone         Internal Rotation 90/90   20     Internal Rotation Prone                Knee Range of Motion   Right Knee   Active (deg) Passive (deg) Pain   Flexion 140       Extension 0         Left Knee   Active (deg) Passive (deg) Pain   Flexion 125   Yes   Extension -5                        Hip Strength - Planes of Motion   Right Strength Right Pain Left Strength Left Pain   Flexion (L2) 3+   3+     Extension           ABduction 2+ Yes 3- Yes   ADduction           Internal Rotation           External Rotation             Knee Strength   Right Strength Right Pain Left Strength Left Pain   Flexion (S2) 4   3+ Yes   Prone Flexion           Extension (L3) 4   4       Ankle/Foot Strength   Right Strength Right Pain Left Strength Left Pain   Dorsiflexion (L4) 4   4     Plantar " Flexion (S1)           Inversion 4   4     Eversion 4   4     Great Toe Flexion           Great Toe Extension (L5)           Lesser Toes Flexion           Lesser Toes Extension                     Gait Analysis  Gait Pattern: Antalgic  Walking Speed: Decreased    Right Side Walking Observations  Decreased: Arm Swing       Left Side Walking Observations  Decreased: Arm Swing              Activities   Therapeutic Exercise  Therapeutic Exercise Performed: Yes  Therapeutic Exercise Activity 1: Hip abduction isometric5 sec hold x 5  Therapeutic Exercise Activity 2: Hip adduction isometric 5 sec hold x 5  Therapeutic Exercise Activity 3: supine knees side to side x 10                                   Access Code: [email or web address]  URL: [email or web address]  Date: 08/07/2025  Prepared by: Shravan Kearney    Exercises  - Supine Lower Trunk Rotation  - 2 x daily - 7 x weekly - 2 sets - 10 reps  - Supine Hip Adduction Isometric with Ball  - 2 x daily - 7 x weekly - 2 sets - 10 reps - 3 hold  - Hooklying Isometric Clamshell  - 2 x daily - 7 x weekly - 2 sets - 10 reps - 5 hold                   Assessment/Plan    Patient is generally debilitated secondary to multiple medical issue. She has lumbar spine pain which significantly restricts her stooping and reaching ability. She did not want to perform the lumbar mobility exam so we deferred. Her L knee is painful and tends to give out. She needs skilled PT to manage pain and increase general strength to improve functional mobility  in all domains. Neha Tsang understood PT plan.    Goals  Increase bilat hip abduction strength to 3/5  Patient will be able to lift 5 lb weight from knee level height x 5 reps  Neha Tsang will be able to negotiate 1 flight of steps independently   Patient will report reduction of lumbar pain to 0-4 level  Independent HEP   Improve outcome score by 5 points

## 2025-08-07 ENCOUNTER — EVALUATION (OUTPATIENT)
Dept: PHYSICAL THERAPY | Facility: CLINIC | Age: 80
End: 2025-08-07
Payer: MEDICARE

## 2025-08-07 DIAGNOSIS — M25.562 CHRONIC PAIN OF LEFT KNEE: Primary | ICD-10-CM

## 2025-08-07 DIAGNOSIS — G89.29 CHRONIC PAIN OF LEFT KNEE: Primary | ICD-10-CM

## 2025-08-07 DIAGNOSIS — M54.50 CHRONIC LOW BACK PAIN WITHOUT SCIATICA: ICD-10-CM

## 2025-08-07 DIAGNOSIS — G89.29 CHRONIC LOW BACK PAIN WITHOUT SCIATICA: ICD-10-CM

## 2025-08-07 PROCEDURE — 97163 PT EVAL HIGH COMPLEX 45 MIN: CPT | Mod: GP | Performed by: PHYSICAL THERAPIST

## 2025-08-07 PROCEDURE — 97110 THERAPEUTIC EXERCISES: CPT | Mod: GP | Performed by: PHYSICAL THERAPIST

## 2025-08-07 ASSESSMENT — ENCOUNTER SYMPTOMS
DEPRESSION: 1
LOSS OF SENSATION IN FEET: 0
OCCASIONAL FEELINGS OF UNSTEADINESS: 1

## 2025-08-12 ENCOUNTER — ANTICOAGULATION - WARFARIN VISIT (OUTPATIENT)
Dept: CARDIOLOGY | Facility: CLINIC | Age: 80
End: 2025-08-12
Payer: MEDICARE

## 2025-08-12 DIAGNOSIS — Z95.2 S/P MVR (MITRAL VALVE REPLACEMENT): Primary | ICD-10-CM

## 2025-08-12 LAB
POC INR: 1.7 (ref 0.9–1.1)
POC PROTHROMBIN TIME: ABNORMAL (ref 9.3–12.5)

## 2025-08-12 PROCEDURE — 85610 PROTHROMBIN TIME: CPT | Mod: QW | Performed by: INTERNAL MEDICINE

## 2025-08-12 PROCEDURE — 99211 OFF/OP EST MAY X REQ PHY/QHP: CPT

## 2025-08-13 ENCOUNTER — TREATMENT (OUTPATIENT)
Dept: PHYSICAL THERAPY | Facility: CLINIC | Age: 80
End: 2025-08-13
Payer: MEDICARE

## 2025-08-13 DIAGNOSIS — M25.562 CHRONIC PAIN OF LEFT KNEE: ICD-10-CM

## 2025-08-13 DIAGNOSIS — G89.29 CHRONIC PAIN OF LEFT KNEE: ICD-10-CM

## 2025-08-13 PROCEDURE — 97110 THERAPEUTIC EXERCISES: CPT | Mod: GP,CQ

## 2025-08-15 ENCOUNTER — TREATMENT (OUTPATIENT)
Dept: PHYSICAL THERAPY | Facility: CLINIC | Age: 80
End: 2025-08-15
Payer: MEDICARE

## 2025-08-15 DIAGNOSIS — M25.562 CHRONIC PAIN OF LEFT KNEE: ICD-10-CM

## 2025-08-15 DIAGNOSIS — G89.29 CHRONIC PAIN OF LEFT KNEE: ICD-10-CM

## 2025-08-15 PROCEDURE — 97110 THERAPEUTIC EXERCISES: CPT | Mod: GP,CQ

## 2025-08-19 ENCOUNTER — TREATMENT (OUTPATIENT)
Dept: PHYSICAL THERAPY | Facility: CLINIC | Age: 80
End: 2025-08-19
Payer: MEDICARE

## 2025-08-19 ENCOUNTER — ANTICOAGULATION - WARFARIN VISIT (OUTPATIENT)
Dept: CARDIOLOGY | Facility: CLINIC | Age: 80
End: 2025-08-19
Payer: MEDICARE

## 2025-08-19 DIAGNOSIS — M25.562 CHRONIC PAIN OF LEFT KNEE: ICD-10-CM

## 2025-08-19 DIAGNOSIS — Z95.2 S/P MVR (MITRAL VALVE REPLACEMENT): Primary | ICD-10-CM

## 2025-08-19 DIAGNOSIS — I48.3 TYPICAL ATRIAL FLUTTER (MULTI): ICD-10-CM

## 2025-08-19 DIAGNOSIS — G89.29 CHRONIC PAIN OF LEFT KNEE: ICD-10-CM

## 2025-08-19 LAB
POC INR: 2.7 (ref 0.9–1.1)
POC PROTHROMBIN TIME: ABNORMAL (ref 9.3–12.5)

## 2025-08-19 PROCEDURE — 99211 OFF/OP EST MAY X REQ PHY/QHP: CPT

## 2025-08-19 PROCEDURE — 97110 THERAPEUTIC EXERCISES: CPT | Mod: GP,CQ

## 2025-08-19 PROCEDURE — 85610 PROTHROMBIN TIME: CPT | Mod: QW | Performed by: INTERNAL MEDICINE

## 2025-08-19 RX ORDER — WARFARIN 3 MG/1
TABLET ORAL
Qty: 135 TABLET | Refills: 2 | Status: SHIPPED | OUTPATIENT
Start: 2025-08-19

## 2025-08-22 ENCOUNTER — APPOINTMENT (OUTPATIENT)
Dept: PHYSICAL THERAPY | Facility: CLINIC | Age: 80
End: 2025-08-22
Payer: MEDICARE

## 2025-08-25 ENCOUNTER — TREATMENT (OUTPATIENT)
Dept: PHYSICAL THERAPY | Facility: CLINIC | Age: 80
End: 2025-08-25
Payer: MEDICARE

## 2025-08-25 DIAGNOSIS — G89.29 CHRONIC LOW BACK PAIN WITHOUT SCIATICA: ICD-10-CM

## 2025-08-25 DIAGNOSIS — M25.562 CHRONIC PAIN OF LEFT KNEE: Primary | ICD-10-CM

## 2025-08-25 DIAGNOSIS — M54.50 CHRONIC LOW BACK PAIN WITHOUT SCIATICA: ICD-10-CM

## 2025-08-25 DIAGNOSIS — G89.29 CHRONIC PAIN OF LEFT KNEE: Primary | ICD-10-CM

## 2025-08-25 PROCEDURE — 97110 THERAPEUTIC EXERCISES: CPT | Mod: GP | Performed by: PHYSICAL THERAPIST

## 2025-08-26 DIAGNOSIS — I10 PRIMARY HYPERTENSION: ICD-10-CM

## 2025-08-26 DIAGNOSIS — Z95.1 S/P CABG (CORONARY ARTERY BYPASS GRAFT): ICD-10-CM

## 2025-08-26 DIAGNOSIS — E78.2 MIXED HYPERLIPIDEMIA: ICD-10-CM

## 2025-08-27 ENCOUNTER — ANTICOAGULATION - WARFARIN VISIT (OUTPATIENT)
Dept: CARDIOLOGY | Facility: CLINIC | Age: 80
End: 2025-08-27
Payer: MEDICARE

## 2025-08-27 ENCOUNTER — TREATMENT (OUTPATIENT)
Dept: PHYSICAL THERAPY | Facility: CLINIC | Age: 80
End: 2025-08-27
Payer: MEDICARE

## 2025-08-27 DIAGNOSIS — G89.29 CHRONIC PAIN OF LEFT KNEE: Primary | ICD-10-CM

## 2025-08-27 DIAGNOSIS — M54.50 CHRONIC LOW BACK PAIN WITHOUT SCIATICA: ICD-10-CM

## 2025-08-27 DIAGNOSIS — G89.29 CHRONIC LOW BACK PAIN WITHOUT SCIATICA: ICD-10-CM

## 2025-08-27 DIAGNOSIS — Z95.2 S/P MVR (MITRAL VALVE REPLACEMENT): Primary | ICD-10-CM

## 2025-08-27 DIAGNOSIS — M25.562 CHRONIC PAIN OF LEFT KNEE: Primary | ICD-10-CM

## 2025-08-27 LAB
POC INR: 2.7 (ref 0.9–1.1)
POC PROTHROMBIN TIME: ABNORMAL (ref 9.3–12.5)

## 2025-08-27 PROCEDURE — 99211 OFF/OP EST MAY X REQ PHY/QHP: CPT

## 2025-08-27 PROCEDURE — 97110 THERAPEUTIC EXERCISES: CPT | Mod: GP | Performed by: PHYSICAL THERAPIST

## 2025-08-27 PROCEDURE — 85610 PROTHROMBIN TIME: CPT | Mod: QW | Performed by: INTERNAL MEDICINE

## 2025-08-27 RX ORDER — SPIRONOLACTONE 25 MG/1
25 TABLET ORAL DAILY
Qty: 90 TABLET | Refills: 0 | Status: SHIPPED | OUTPATIENT
Start: 2025-08-27

## 2025-08-27 RX ORDER — ATORVASTATIN CALCIUM 40 MG/1
40 TABLET, FILM COATED ORAL DAILY
Qty: 90 TABLET | Refills: 0 | Status: SHIPPED | OUTPATIENT
Start: 2025-08-27

## 2025-08-27 RX ORDER — AMLODIPINE BESYLATE 10 MG/1
10 TABLET ORAL DAILY
Qty: 90 TABLET | Refills: 0 | Status: SHIPPED | OUTPATIENT
Start: 2025-08-27

## 2025-09-15 ENCOUNTER — APPOINTMENT (OUTPATIENT)
Dept: PHYSICAL THERAPY | Facility: CLINIC | Age: 80
End: 2025-09-15
Payer: MEDICARE